# Patient Record
Sex: FEMALE | Race: AMERICAN INDIAN OR ALASKA NATIVE | Employment: OTHER | URBAN - METROPOLITAN AREA
[De-identification: names, ages, dates, MRNs, and addresses within clinical notes are randomized per-mention and may not be internally consistent; named-entity substitution may affect disease eponyms.]

---

## 2017-03-20 ENCOUNTER — HOSPITAL ENCOUNTER (EMERGENCY)
Age: 32
Discharge: HOME | End: 2017-03-20
Attending: EMERGENCY MEDICINE
Payer: MEDICARE

## 2017-03-20 VITALS
BODY MASS INDEX: 24.25 KG/M2 | HEART RATE: 87 BPM | TEMPERATURE: 97 F | OXYGEN SATURATION: 99 % | SYSTOLIC BLOOD PRESSURE: 102 MMHG | DIASTOLIC BLOOD PRESSURE: 69 MMHG | HEIGHT: 68 IN | RESPIRATION RATE: 16 BRPM | WEIGHT: 160 LBS

## 2017-03-20 DIAGNOSIS — Z34.90 INTRAUTERINE PREGNANCY: Primary | ICD-10-CM

## 2017-03-20 LAB
-: ABNORMAL
AMORPHOUS: ABNORMAL
AMPHETAMINE SCREEN URINE: NEGATIVE
BACTERIA: ABNORMAL
BARBITURATE SCREEN URINE: NEGATIVE
BENZODIAZEPINE SCREEN, URINE: NEGATIVE
BILIRUBIN URINE: ABNORMAL
BUPRENORPHINE URINE: ABNORMAL
CANNABINOID SCREEN URINE: NEGATIVE
CASTS UA: ABNORMAL /LPF (ref 0–8)
COCAINE METABOLITE, URINE: POSITIVE
COLOR: ABNORMAL
COMMENT UA: ABNORMAL
CRYSTALS, UA: ABNORMAL /HPF
DIRECT EXAM: NORMAL
EPITHELIAL CELLS UA: ABNORMAL /HPF (ref 0–5)
GLUCOSE URINE: NEGATIVE
HCG QUANTITATIVE: ABNORMAL IU/L
HCG(URINE) PREGNANCY TEST: POSITIVE
HIV AG/AB: NONREACTIVE
KETONES, URINE: ABNORMAL
LEUKOCYTE ESTERASE, URINE: NEGATIVE
Lab: NORMAL
MDMA URINE: ABNORMAL
METHADONE SCREEN, URINE: NEGATIVE
METHAMPHETAMINE, URINE: ABNORMAL
MUCUS: ABNORMAL
NITRITE, URINE: NEGATIVE
OPIATES, URINE: NEGATIVE
OTHER OBSERVATIONS UA: ABNORMAL
OXYCODONE SCREEN URINE: NEGATIVE
PH UA: 5 (ref 5–8)
PHENCYCLIDINE, URINE: NEGATIVE
PROPOXYPHENE, URINE: ABNORMAL
PROTEIN UA: NEGATIVE
RBC UA: ABNORMAL /HPF (ref 0–4)
RENAL EPITHELIAL, UA: ABNORMAL /HPF
SPECIFIC GRAVITY UA: 1.03 (ref 1–1.03)
SPECIMEN DESCRIPTION: NORMAL
STATUS: NORMAL
TEST INFORMATION: ABNORMAL
TRICHOMONAS: ABNORMAL
TRICYCLIC ANTIDEPRESSANTS, UR: ABNORMAL
TURBIDITY: ABNORMAL
URINE HGB: ABNORMAL
UROBILINOGEN, URINE: NORMAL
WBC UA: ABNORMAL /HPF (ref 0–5)
YEAST: ABNORMAL

## 2017-03-20 PROCEDURE — 96372 THER/PROPH/DIAG INJ SC/IM: CPT

## 2017-03-20 PROCEDURE — 84702 CHORIONIC GONADOTROPIN TEST: CPT

## 2017-03-20 PROCEDURE — 6360000002 HC RX W HCPCS: Performed by: EMERGENCY MEDICINE

## 2017-03-20 PROCEDURE — 87086 URINE CULTURE/COLONY COUNT: CPT

## 2017-03-20 PROCEDURE — 87491 CHLMYD TRACH DNA AMP PROBE: CPT

## 2017-03-20 PROCEDURE — 87389 HIV-1 AG W/HIV-1&-2 AB AG IA: CPT

## 2017-03-20 PROCEDURE — 87660 TRICHOMONAS VAGIN DIR PROBE: CPT

## 2017-03-20 PROCEDURE — 87510 GARDNER VAG DNA DIR PROBE: CPT

## 2017-03-20 PROCEDURE — 6370000000 HC RX 637 (ALT 250 FOR IP): Performed by: EMERGENCY MEDICINE

## 2017-03-20 PROCEDURE — 81001 URINALYSIS AUTO W/SCOPE: CPT

## 2017-03-20 PROCEDURE — 87480 CANDIDA DNA DIR PROBE: CPT

## 2017-03-20 PROCEDURE — 80307 DRUG TEST PRSMV CHEM ANLYZR: CPT

## 2017-03-20 PROCEDURE — 99284 EMERGENCY DEPT VISIT MOD MDM: CPT

## 2017-03-20 PROCEDURE — 84703 CHORIONIC GONADOTROPIN ASSAY: CPT

## 2017-03-20 PROCEDURE — 87591 N.GONORRHOEAE DNA AMP PROB: CPT

## 2017-03-20 PROCEDURE — 86403 PARTICLE AGGLUT ANTBDY SCRN: CPT

## 2017-03-20 RX ORDER — AZITHROMYCIN 250 MG/1
1000 TABLET, FILM COATED ORAL ONCE
Status: COMPLETED | OUTPATIENT
Start: 2017-03-20 | End: 2017-03-20

## 2017-03-20 RX ORDER — CEFTRIAXONE SODIUM 250 MG/1
250 INJECTION, POWDER, FOR SOLUTION INTRAMUSCULAR; INTRAVENOUS ONCE
Status: COMPLETED | OUTPATIENT
Start: 2017-03-20 | End: 2017-03-20

## 2017-03-20 RX ADMIN — CEFTRIAXONE SODIUM 250 MG: 250 INJECTION, POWDER, FOR SOLUTION INTRAMUSCULAR; INTRAVENOUS at 12:49

## 2017-03-20 RX ADMIN — AZITHROMYCIN 1000 MG: 250 TABLET, FILM COATED ORAL at 12:49

## 2017-03-20 ASSESSMENT — PAIN DESCRIPTION - LOCATION: LOCATION: ABDOMEN

## 2017-03-20 ASSESSMENT — ENCOUNTER SYMPTOMS
VOMITING: 0
ABDOMINAL PAIN: 1
COUGH: 0
SHORTNESS OF BREATH: 0
NAUSEA: 0
DIARRHEA: 0
COLOR CHANGE: 0

## 2017-03-20 ASSESSMENT — PAIN DESCRIPTION - ONSET: ONSET: SUDDEN

## 2017-03-20 ASSESSMENT — PAIN DESCRIPTION - PAIN TYPE: TYPE: ACUTE PAIN

## 2017-03-20 ASSESSMENT — PAIN DESCRIPTION - PROGRESSION: CLINICAL_PROGRESSION: NOT CHANGED

## 2017-03-20 ASSESSMENT — PAIN DESCRIPTION - ORIENTATION: ORIENTATION: LOWER

## 2017-03-20 ASSESSMENT — PAIN DESCRIPTION - FREQUENCY: FREQUENCY: CONTINUOUS

## 2017-03-20 ASSESSMENT — PAIN SCALES - GENERAL: PAINLEVEL_OUTOF10: 10

## 2017-03-20 ASSESSMENT — PAIN DESCRIPTION - DESCRIPTORS: DESCRIPTORS: CONSTANT;SHARP

## 2017-03-21 LAB
C TRACH DNA GENITAL QL NAA+PROBE: NEGATIVE
CULTURE: ABNORMAL
CULTURE: ABNORMAL
Lab: ABNORMAL
N. GONORRHOEAE DNA: NEGATIVE
SPECIMEN DESCRIPTION: ABNORMAL
STATUS: ABNORMAL

## 2018-08-18 ENCOUNTER — TELEPHONE (OUTPATIENT)
Dept: OTHER | Age: 33
End: 2018-08-18

## 2019-09-06 ENCOUNTER — OFFICE VISIT (OUTPATIENT)
Dept: INTERNAL MEDICINE CLINIC | Age: 34
End: 2019-09-06
Payer: MEDICARE

## 2019-09-06 VITALS
BODY MASS INDEX: 28.8 KG/M2 | DIASTOLIC BLOOD PRESSURE: 80 MMHG | OXYGEN SATURATION: 99 % | WEIGHT: 201.2 LBS | SYSTOLIC BLOOD PRESSURE: 100 MMHG | HEIGHT: 70 IN | HEART RATE: 89 BPM

## 2019-09-06 DIAGNOSIS — F17.200 CURRENT SMOKER: Chronic | ICD-10-CM

## 2019-09-06 DIAGNOSIS — Z83.3 FAMILY HISTORY OF DIABETES MELLITUS: ICD-10-CM

## 2019-09-06 DIAGNOSIS — F20.9 SCHIZOPHRENIA, UNSPECIFIED TYPE (HCC): Chronic | ICD-10-CM

## 2019-09-06 DIAGNOSIS — R56.9 CONVULSIONS, UNSPECIFIED CONVULSION TYPE (HCC): ICD-10-CM

## 2019-09-06 DIAGNOSIS — Z76.89 ENCOUNTER TO ESTABLISH CARE WITH NEW DOCTOR: Primary | ICD-10-CM

## 2019-09-06 DIAGNOSIS — G89.4 CHRONIC PAIN SYNDROME: ICD-10-CM

## 2019-09-06 DIAGNOSIS — F14.11 COCAINE ABUSE IN REMISSION (HCC): ICD-10-CM

## 2019-09-06 DIAGNOSIS — F12.90 MARIJUANA USE: ICD-10-CM

## 2019-09-06 DIAGNOSIS — Z98.51 HISTORY OF BILATERAL TUBAL LIGATION: ICD-10-CM

## 2019-09-06 DIAGNOSIS — M48.061 SPINAL STENOSIS OF LUMBAR REGION, UNSPECIFIED WHETHER NEUROGENIC CLAUDICATION PRESENT: Chronic | ICD-10-CM

## 2019-09-06 DIAGNOSIS — R53.83 FATIGUE, UNSPECIFIED TYPE: ICD-10-CM

## 2019-09-06 PROBLEM — F14.10 COCAINE ABUSE (HCC): Status: ACTIVE | Noted: 2019-09-06

## 2019-09-06 PROBLEM — F14.10 COCAINE ABUSE (HCC): Status: RESOLVED | Noted: 2019-09-06 | Resolved: 2019-09-06

## 2019-09-06 PROCEDURE — 4004F PT TOBACCO SCREEN RCVD TLK: CPT | Performed by: FAMILY MEDICINE

## 2019-09-06 PROCEDURE — G8419 CALC BMI OUT NRM PARAM NOF/U: HCPCS | Performed by: FAMILY MEDICINE

## 2019-09-06 PROCEDURE — G8427 DOCREV CUR MEDS BY ELIG CLIN: HCPCS | Performed by: FAMILY MEDICINE

## 2019-09-06 PROCEDURE — 99204 OFFICE O/P NEW MOD 45 MIN: CPT | Performed by: FAMILY MEDICINE

## 2019-09-06 RX ORDER — ALBUTEROL SULFATE 90 UG/1
2 AEROSOL, METERED RESPIRATORY (INHALATION) 4 TIMES DAILY PRN
Qty: 3 INHALER | Refills: 1 | Status: ON HOLD | OUTPATIENT
Start: 2019-09-06 | End: 2021-12-28 | Stop reason: ALTCHOICE

## 2019-09-06 RX ORDER — BUDESONIDE AND FORMOTEROL FUMARATE DIHYDRATE 80; 4.5 UG/1; UG/1
2 AEROSOL RESPIRATORY (INHALATION) 2 TIMES DAILY
Qty: 1 INHALER | Refills: 0 | Status: ON HOLD | OUTPATIENT
Start: 2019-09-06 | End: 2021-12-28 | Stop reason: ALTCHOICE

## 2019-09-06 ASSESSMENT — PATIENT HEALTH QUESTIONNAIRE - PHQ9
2. FEELING DOWN, DEPRESSED OR HOPELESS: 0
SUM OF ALL RESPONSES TO PHQ QUESTIONS 1-9: 0
1. LITTLE INTEREST OR PLEASURE IN DOING THINGS: 0
SUM OF ALL RESPONSES TO PHQ QUESTIONS 1-9: 0
SUM OF ALL RESPONSES TO PHQ9 QUESTIONS 1 & 2: 0

## 2019-09-06 NOTE — PROGRESS NOTES
her Pap and breast exam with her GYN  Further recommendations to follow labs  Observe and call for any concern  This note is created with a voice recognition program and while intend to generate a document that accurately reflects the content of the visit, no guarantee can be provided that every mistake has been identified and corrected by editing.           Stephanie Fernandez MD

## 2019-09-07 ASSESSMENT — ENCOUNTER SYMPTOMS
ALLERGIC/IMMUNOLOGIC NEGATIVE: 1
EYES NEGATIVE: 1
GASTROINTESTINAL NEGATIVE: 1
RESPIRATORY NEGATIVE: 1

## 2019-09-09 ENCOUNTER — TELEPHONE (OUTPATIENT)
Dept: INTERNAL MEDICINE CLINIC | Age: 34
End: 2019-09-09

## 2019-09-09 DIAGNOSIS — M48.00 SPINAL STENOSIS, UNSPECIFIED SPINAL REGION: Primary | Chronic | ICD-10-CM

## 2019-10-02 ENCOUNTER — HOSPITAL ENCOUNTER (EMERGENCY)
Age: 34
Discharge: HOME | End: 2019-10-02
Attending: EMERGENCY MEDICINE
Payer: MEDICARE

## 2019-10-02 VITALS
OXYGEN SATURATION: 100 % | HEIGHT: 66 IN | HEART RATE: 74 BPM | DIASTOLIC BLOOD PRESSURE: 86 MMHG | TEMPERATURE: 98.2 F | RESPIRATION RATE: 16 BRPM | WEIGHT: 201 LBS | BODY MASS INDEX: 32.3 KG/M2 | SYSTOLIC BLOOD PRESSURE: 118 MMHG

## 2019-10-02 RX ORDER — TRAZODONE HYDROCHLORIDE 150 MG/1
150 TABLET ORAL 2 TIMES DAILY PRN
Status: ON HOLD | COMMUNITY
End: 2021-12-28 | Stop reason: ALTCHOICE

## 2019-10-02 RX ORDER — OXCARBAZEPINE 150 MG/1
150 TABLET, FILM COATED ORAL 2 TIMES DAILY
Status: ON HOLD | COMMUNITY
End: 2021-12-28

## 2019-10-02 RX ORDER — HYDROXYZINE PAMOATE 50 MG/1
CAPSULE ORAL 3 TIMES DAILY
Status: ON HOLD | COMMUNITY
End: 2021-12-29 | Stop reason: HOSPADM

## 2019-10-02 ASSESSMENT — PAIN SCALES - GENERAL: PAINLEVEL_OUTOF10: 7

## 2020-06-24 ENCOUNTER — TELEPHONE (OUTPATIENT)
Dept: FAMILY MEDICINE CLINIC | Age: 35
End: 2020-06-24

## 2021-08-11 ENCOUNTER — SOCIAL WORK (OUTPATIENT)
Dept: PSYCHIATRY | Age: 36
End: 2021-08-11
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
## 2021-09-04 ENCOUNTER — HOSPITAL ENCOUNTER (EMERGENCY)
Age: 36
Discharge: HOME | End: 2021-09-04
Attending: EMERGENCY MEDICINE
Payer: MEDICARE

## 2021-09-04 ENCOUNTER — APPOINTMENT (OUTPATIENT)
Dept: GENERAL RADIOLOGY | Age: 36
End: 2021-09-04
Payer: MEDICARE

## 2021-09-04 VITALS
WEIGHT: 180 LBS | HEIGHT: 66 IN | RESPIRATION RATE: 16 BRPM | SYSTOLIC BLOOD PRESSURE: 116 MMHG | HEART RATE: 80 BPM | DIASTOLIC BLOOD PRESSURE: 75 MMHG | BODY MASS INDEX: 28.93 KG/M2 | TEMPERATURE: 97.3 F | OXYGEN SATURATION: 99 %

## 2021-09-04 DIAGNOSIS — S93.491A SPRAIN OF ANTERIOR TALOFIBULAR LIGAMENT OF RIGHT ANKLE, INITIAL ENCOUNTER: Primary | ICD-10-CM

## 2021-09-04 PROCEDURE — 6370000000 HC RX 637 (ALT 250 FOR IP): Performed by: EMERGENCY MEDICINE

## 2021-09-04 PROCEDURE — 73610 X-RAY EXAM OF ANKLE: CPT

## 2021-09-04 PROCEDURE — 99284 EMERGENCY DEPT VISIT MOD MDM: CPT

## 2021-09-04 RX ORDER — IBUPROFEN 800 MG/1
800 TABLET ORAL ONCE
Status: COMPLETED | OUTPATIENT
Start: 2021-09-04 | End: 2021-09-04

## 2021-09-04 RX ORDER — ACETAMINOPHEN 500 MG
1000 TABLET ORAL EVERY 8 HOURS PRN
Qty: 21 TABLET | Refills: 0 | Status: SHIPPED | OUTPATIENT
Start: 2021-09-04 | End: 2021-12-13

## 2021-09-04 RX ORDER — NAPROXEN 500 MG/1
500 TABLET ORAL 2 TIMES DAILY PRN
Qty: 14 TABLET | Refills: 0 | Status: SHIPPED | OUTPATIENT
Start: 2021-09-04 | End: 2021-11-15

## 2021-09-04 RX ADMIN — IBUPROFEN 800 MG: 800 TABLET, FILM COATED ORAL at 18:43

## 2021-09-04 ASSESSMENT — PAIN SCALES - GENERAL
PAINLEVEL_OUTOF10: 10

## 2021-09-04 ASSESSMENT — ENCOUNTER SYMPTOMS: COLOR CHANGE: 0
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
## 2021-11-15 ENCOUNTER — APPOINTMENT (OUTPATIENT)
Dept: CT IMAGING | Age: 36
End: 2021-11-15
Payer: MEDICARE

## 2021-11-15 ENCOUNTER — HOSPITAL ENCOUNTER (EMERGENCY)
Age: 36
Discharge: HOME | End: 2021-11-15
Attending: EMERGENCY MEDICINE
Payer: MEDICARE

## 2021-11-15 ENCOUNTER — APPOINTMENT (OUTPATIENT)
Dept: GENERAL RADIOLOGY | Age: 36
End: 2021-11-15
Payer: MEDICARE

## 2021-11-15 ENCOUNTER — HOSPITAL ENCOUNTER (OUTPATIENT)
Age: 36
Discharge: HOME | End: 2021-11-17
Payer: MEDICARE

## 2021-11-15 VITALS
RESPIRATION RATE: 20 BRPM | SYSTOLIC BLOOD PRESSURE: 130 MMHG | DIASTOLIC BLOOD PRESSURE: 89 MMHG | OXYGEN SATURATION: 98 % | TEMPERATURE: 98.2 F | HEART RATE: 93 BPM
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
## 2021-11-15 DIAGNOSIS — T14.8XXA CONTUSION OF SOFT TISSUE: Primary | ICD-10-CM

## 2021-11-15 LAB
ABSOLUTE EOS #: 0.41 K/UL (ref 0–0.44)
ABSOLUTE IMMATURE GRANULOCYTE: 0.04 K/UL (ref 0–0.3)
ABSOLUTE LYMPH #: 2.66 K/UL (ref 1.1–3.7)
ABSOLUTE MONO #: 0.87 K/UL (ref 0.1–1.2)
ANION GAP SERPL CALCULATED.3IONS-SCNC: 11 MMOL/L (ref 9–17)
BASOPHILS # BLD: 1 % (ref 0–2)
BASOPHILS ABSOLUTE: 0.08 K/UL (ref 0–0.2)
BUN BLDV-MCNC: 8 MG/DL (ref 6–20)
BUN/CREAT BLD: NORMAL (ref 9–20)
CALCIUM SERPL-MCNC: 8.9 MG/DL (ref 8.6–10.4)
CHLORIDE BLD-SCNC: 107 MMOL/L (ref 98–107)
CO2: 21 MMOL/L (ref 20–31)
CREAT SERPL-MCNC: 0.78 MG/DL (ref 0.5–0.9)
DIFFERENTIAL TYPE: ABNORMAL
EOSINOPHILS RELATIVE PERCENT: 5 % (ref 1–4)
GFR AFRICAN AMERICAN: >60 ML/MIN
GFR NON-AFRICAN AMERICAN: >60 ML/MIN
GFR SERPL CREATININE-BSD FRML MDRD: NORMAL ML/MIN/{1.73_M2}
GFR SERPL CREATININE-BSD FRML MDRD: NORMAL ML/MIN/{1.73_M2}
GLUCOSE BLD-MCNC: 91 MG/DL (ref 70–99)
HCG QUANTITATIVE: <1 IU/L
HCT VFR BLD CALC: 41.6 % (ref 36.3–47.1)
HEMOGLOBIN: 13.8 G/DL (ref 11.9–15.1)
IMMATURE GRANULOCYTES: 1 %
LYMPHOCYTES # BLD: 32 % (ref 24–43)
MCH RBC QN AUTO: 30.5 PG (ref 25.2–33.5)
MCHC RBC AUTO-ENTMCNC: 33.2 G/DL (ref 28.4–34.8)
MCV RBC AUTO: 92 FL (ref 82.6–102.9)
MONOCYTES # BLD: 10 % (ref 3–12)
NRBC AUTOMATED: 0 PER 100 WBC
PDW BLD-RTO: 13.2 % (ref 11.8–14.4)
PLATELET # BLD: 351 K/UL (ref 138–453)
PLATELET ESTIMATE: ABNORMAL
PMV BLD AUTO: 9.2 FL (ref 8.1–13.5)
POTASSIUM SERPL-SCNC: 3.9 MMOL/L (ref 3.7–5.3)
RBC # BLD: 4.52 M/UL (ref 3.95–5.11)
RBC # BLD: ABNORMAL 10*6/UL
SEG NEUTROPHILS: 51 % (ref 36–65)
SEGMENTED NEUTROPHILS ABSOLUTE COUNT: 4.3 K/UL (ref 1.5–8.1)
SODIUM BLD-SCNC: 139 MMOL/L (ref 135–144)
WBC # BLD: 8.4 K/UL (ref 3.5–11.3)
WBC # BLD: ABNORMAL 10*3/UL

## 2021-11-15 PROCEDURE — 3209999900 CT LUMBAR SPINE TRAUMA RECONSTRUCTION

## 2021-11-15 PROCEDURE — 93005 ELECTROCARDIOGRAM TRACING: CPT | Performed by: STUDENT IN AN ORGANIZED HEALTH CARE EDUCATION/TRAINING PROGRAM

## 2021-11-15 PROCEDURE — 6370000000 HC RX 637 (ALT 250 FOR IP): Performed by: STUDENT IN AN ORGANIZED HEALTH CARE EDUCATION/TRAINING PROGRAM

## 2021-11-15 PROCEDURE — 84702 CHORIONIC GONADOTROPIN TEST: CPT

## 2021-11-15 PROCEDURE — 96375 TX/PRO/DX INJ NEW DRUG ADDON: CPT

## 2021-11-15 PROCEDURE — 6360000002 HC RX W HCPCS: Performed by: STUDENT IN AN ORGANIZED HEALTH CARE EDUCATION/TRAINING PROGRAM

## 2021-11-15 PROCEDURE — 6360000002 HC RX W HCPCS: Performed by: EMERGENCY MEDICINE

## 2021-11-15 PROCEDURE — 6360000004 HC RX CONTRAST MEDICATION: Performed by: STUDENT IN AN ORGANIZED HEALTH CARE EDUCATION/TRAINING PROGRAM

## 2021-11-15 PROCEDURE — 70450 CT HEAD/BRAIN W/O DYE: CPT

## 2021-11-15 PROCEDURE — 72125 CT NECK SPINE W/O DYE: CPT

## 2021-11-15 PROCEDURE — 85025 COMPLETE CBC W/AUTO DIFF WBC: CPT

## 2021-11-15 PROCEDURE — 73590 X-RAY EXAM OF LOWER LEG: CPT

## 2021-11-15 PROCEDURE — 99283 EMERGENCY DEPT VISIT LOW MDM: CPT

## 2021-11-15 PROCEDURE — 96374 THER/PROPH/DIAG INJ IV PUSH: CPT

## 2021-11-15 PROCEDURE — 71260 CT THORAX DX C+: CPT

## 2021-11-15 PROCEDURE — 70486 CT MAXILLOFACIAL W/O DYE: CPT

## 2021-11-15 PROCEDURE — 70498 CT ANGIOGRAPHY NECK: CPT

## 2021-11-15 PROCEDURE — 3209999900 CT THORACIC SPINE TRAUMA RECONSTRUCTION

## 2021-11-15 PROCEDURE — 80048 BASIC METABOLIC PNL TOTAL CA: CPT

## 2021-11-15 PROCEDURE — 73130 X-RAY EXAM OF HAND: CPT

## 2021-11-15 RX ORDER — ACETAMINOPHEN 500 MG
1000 TABLET ORAL ONCE
Status: COMPLETED | OUTPATIENT
Start: 2021-11-15 | End: 2021-11-15

## 2021-11-15 RX ORDER — LIDOCAINE 4 G/G
1 PATCH TOPICAL DAILY
Qty: 7 PATCH | Refills: 0 | Status: SHIPPED | OUTPATIENT
Start: 2021-11-15 | End: 2021-11-15 | Stop reason: SDUPTHER

## 2021-11-15 RX ORDER — METOCLOPRAMIDE HYDROCHLORIDE 5 MG/ML
10 INJECTION INTRAMUSCULAR; INTRAVENOUS ONCE
Status: COMPLETED | OUTPATIENT
Start: 2021-11-15 | End: 2021-11-15

## 2021-11-15 RX ORDER — LIDOCAINE 4 G/G
1 PATCH TOPICAL DAILY
Qty: 7 PATCH | Refills: 0 | Status: SHIPPED | OUTPATIENT
Start: 2021-11-15 | End: 2021-11-22

## 2021-11-15 RX ORDER — METHOCARBAMOL 750 MG/1
750 TABLET, FILM COATED ORAL 4 TIMES DAILY
Qty: 20 TABLET | Refills: 0 | Status: SHIPPED | OUTPATIENT
Start: 2021-11-15 | End: 2021-11-15 | Stop reason: SDUPTHER

## 2021-11-15 RX ORDER — METOCLOPRAMIDE HYDROCHLORIDE 5 MG/ML
10 INJECTION INTRAMUSCULAR; INTRAVENOUS ONCE
Status: DISCONTINUED | OUTPATIENT
Start: 2021-11-15 | End: 2021-11-15

## 2021-11-15 RX ORDER — METHOCARBAMOL 750 MG/1
750 TABLET, FILM COATED ORAL 4 TIMES DAILY
Qty: 20 TABLET | Refills: 0 | Status: SHIPPED | OUTPATIENT
Start: 2021-11-15 | End: 2021-11-20

## 2021-11-15 RX ORDER — ACETAMINOPHEN 500 MG
500 TABLET ORAL 4 TIMES DAILY PRN
Qty: 28 TABLET | Refills: 0 | OUTPATIENT
Start: 2021-11-15 | End: 2021-12-13

## 2021-11-15 RX ORDER — ACETAMINOPHEN 500 MG
500 TABLET ORAL 4 TIMES DAILY PRN
Qty: 28 TABLET | Refills: 0 | Status: SHIPPED | OUTPATIENT
Start: 2021-11-15 | End: 2021-11-15 | Stop reason: SDUPTHER

## 2021-11-15 RX ORDER — IBUPROFEN 600 MG/1
600 TABLET ORAL 4 TIMES DAILY PRN
Qty: 28 TABLET | Refills: 0 | Status: SHIPPED | OUTPATIENT
Start: 2021-11-15 | End: 2021-11-15 | Stop reason: SDUPTHER

## 2021-11-15 RX ORDER — IBUPROFEN 600 MG/1
600 TABLET ORAL 4 TIMES DAILY PRN
Qty: 28 TABLET | Refills: 0 | OUTPATIENT
Start: 2021-11-15 | End: 2021-12-13

## 2021-11-15 RX ORDER — IBUPROFEN 800 MG/1
800 TABLET ORAL ONCE
Status: COMPLETED | OUTPATIENT
Start: 2021-11-15 | End: 2021-11-15

## 2021-11-15 RX ORDER — METHOCARBAMOL 500 MG/1
750 TABLET, FILM COATED ORAL ONCE
Status: COMPLETED | OUTPATIENT
Start: 2021-11-15 | End: 2021-11-15

## 2021-11-15 RX ORDER — MORPHINE SULFATE 4 MG/ML
4 INJECTION, SOLUTION INTRAMUSCULAR; INTRAVENOUS ONCE
Status: COMPLETED | OUTPATIENT
Start: 2021-11-15 | End: 2021-11-15

## 2021-11-15 RX ADMIN — IBUPROFEN 800 MG: 800 TABLET, FILM COATED ORAL at 17:22

## 2021-11-15 RX ADMIN — ACETAMINOPHEN 1000 MG: 500 TABLET ORAL at 16:23

## 2021-11-15 RX ADMIN — METOCLOPRAMIDE 10 MG: 5 INJECTION, SOLUTION INTRAMUSCULAR; INTRAVENOUS at 20:29

## 2021-11-15 RX ADMIN — METHOCARBAMOL 750 MG: 500 TABLET ORAL at 17:23

## 2021-11-15 RX ADMIN — MORPHINE SULFATE 4 MG: 4 INJECTION, SOLUTION INTRAMUSCULAR; INTRAVENOUS at 16:23

## 2021-11-15 RX ADMIN — IOPAMIDOL 150 ML: 755 INJECTION, SOLUTION INTRAVENOUS at 15:53

## 2021-11-15 ASSESSMENT — ENCOUNTER SYMPTOMS
NAUSEA: 0
VOMITING: 0
COUGH: 1
VOICE CHANGE: 1
EYE REDNESS: 0
SHORTNESS OF BREATH: 1
SORE THROAT: 1
ABDOMINAL PAIN: 0
BACK PAIN: 1

## 2021-11-15 ASSESSMENT — PAIN SCALES - GENERAL
PAINLEVEL_OUTOF10: 8
PAINLEVEL_OUTOF10: 9
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
## 2021-11-15 NOTE — ED PROVIDER NOTES
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 provider.           Joselo Harding MD  Emergency Medicine Resident    (Please note that portions of this note were completed with a voice recognition program.  Efforts were made to edit the dictations but occasionally words are mis-transcribed.)        Emani Raymond MD  Resident  11/15/21 3219       Joselo Harding MD  Resident  11/15/21 2449
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
## 2021-11-15 NOTE — ED NOTES
Bed: 41  Expected date:   Expected time:   Means of arrival:   Comments:  EVELIA Mcarthur RN  11/15/21 5847

## 2021-11-15 NOTE — Clinical Note
Gustav Sandifer was seen and treated in our emergency department on 11/15/2021. She may return to work on 11/17/2021. If you have any questions or concerns, please don't hesitate to call.       Harmeet Manjarrez MD

## 2021-11-16 ENCOUNTER — FOLLOWUP TELEPHONE ENCOUNTER (OUTPATIENT)
Dept: PSYCHIATRY | Age: 36
End: 2021-11-16

## 2021-11-16 LAB
EKG ATRIAL RATE: 79 BPM
EKG P AXIS: 52 DEGREES
EKG P-R INTERVAL: 152 MS
EKG Q-T INTERVAL: 396 MS
EKG QRS DURATION: 82 MS
EKG QTC CALCULATION (BAZETT): 454 MS
EKG R AXIS: 64 DEGREES
EKG T AXIS: 58 DEGREES
EKG VENTRICULAR RATE: 79 BPM

## 2021-11-16 NOTE — TELEPHONE ENCOUNTER
Writer followed up with pt post ER encounter. Referral for Critical access hospital services received from ER Social Work. Contacted pt's cousin phone per her request. Josselin Romano informed writer that she has not seen the pt since yesterday 11/15/21 morning. Writer provided cousin with [facility] contact information should pt contact her.  Contact number provided was [phone]

## 2021-11-16 NOTE — ED NOTES
Patient stated she will go to her cousin's house [facility], [address]. Patient stated it is safe. Patient stated she was able to talk with someone at [address] [place] [address] [address] and will contact them again tomorrow regarding further safe planning. Patient stated [facility] can contact her at cousin's phone number [phone]. [facility] set up [employer] to transport.       [facility]  11/15/21 8381

## 2021-11-16 NOTE — ED NOTES
Patient currently making phone calls to [facility] in [facility] and family members to establish safety plan. LSW provided information for [facility]. LSW spoke with Swapna Tavares earlier with [facility]-she stated if patient is interested in services to contact her.  Patient informed and agreeable to referral.     Levy Corcoran, SHELLIE  04/19/22 0027

## 2021-12-08 ENCOUNTER — TELEPHONE (OUTPATIENT)
Dept: PSYCHIATRY | Age: 36
End: 2021-12-08

## 2021-12-08 NOTE — TELEPHONE ENCOUNTER
This writer was contacted by Client by phone this date to request [facility] services. An intake appointment was scheduled with Jeff Sifuentes for 1:00 PM 12/09/2021 at the [facility] office. [employer] was scheduled for transportation.

## 2021-12-09 ENCOUNTER — HOSPITAL ENCOUNTER (OUTPATIENT)
Dept: PSYCHIATRY | Age: 36
Setting detail: THERAPIES SERIES
Discharge: HOME | End: 2021-12-09
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
## 2021-12-13 ENCOUNTER — HOSPITAL ENCOUNTER (EMERGENCY)
Age: 36
Discharge: HOME | End: 2021-12-13
Attending: EMERGENCY MEDICINE
Payer: MEDICARE

## 2021-12-13 VITALS
TEMPERATURE: 98.8 F | HEIGHT: 66 IN | RESPIRATION RATE: 16 BRPM | DIASTOLIC BLOOD PRESSURE: 87 MMHG | HEART RATE: 99 BPM | WEIGHT: 171 LBS | OXYGEN SATURATION: 95 % | SYSTOLIC BLOOD PRESSURE: 122 MMHG | BODY MASS INDEX: 27.48 KG/M2

## 2021-12-13 DIAGNOSIS — G56.03 BILATERAL CARPAL TUNNEL SYNDROME: ICD-10-CM

## 2021-12-13 DIAGNOSIS — M54.50 ACUTE EXACERBATION OF CHRONIC LOW BACK PAIN: Primary | ICD-10-CM

## 2021-12-13 DIAGNOSIS — G89.29 ACUTE EXACERBATION OF CHRONIC LOW BACK PAIN: Primary | ICD-10-CM

## 2021-12-13 PROCEDURE — 6360000002 HC RX W HCPCS: Performed by: EMERGENCY MEDICINE

## 2021-12-13 PROCEDURE — 96372 THER/PROPH/DIAG INJ SC/IM: CPT

## 2021-12-13 PROCEDURE — 99283 EMERGENCY DEPT VISIT LOW MDM: CPT

## 2021-12-13 PROCEDURE — 6370000000 HC RX 637 (ALT 250 FOR IP): Performed by: EMERGENCY MEDICINE

## 2021-12-13 RX ORDER — LIDOCAINE 50 MG/G
1 PATCH TOPICAL DAILY
Qty: 10 PATCH | Refills: 0 | Status: SHIPPED | OUTPATIENT
Start: 2021-12-13 | End: 2021-12-23

## 2021-12-13 RX ORDER — LIDOCAINE 4 G/G
2 PATCH TOPICAL ONCE
Status: DISCONTINUED | OUTPATIENT
Start: 2021-12-13 | End: 2021-12-13 | Stop reason: HOSPADM

## 2021-12-13 RX ORDER — METHOCARBAMOL 500 MG/1
500 TABLET, FILM COATED ORAL 4 TIMES DAILY PRN
Qty: 20 TABLET | Refills: 0 | Status: SHIPPED | OUTPATIENT
Start: 2021-12-13 | End: 2021-12-18

## 2021-12-13 RX ORDER — DEXAMETHASONE 4 MG/1
10 TABLET ORAL ONCE
Status: COMPLETED | OUTPATIENT
Start: 2021-12-13 | End: 2021-12-13

## 2021-12-13 RX ORDER — ORPHENADRINE CITRATE 30 MG/ML
60 INJECTION INTRAMUSCULAR; INTRAVENOUS ONCE
Status: COMPLETED | OUTPATIENT
Start: 2021-12-13 | End: 2021-12-13

## 2021-12-13 RX ORDER — HYDROCODONE BITARTRATE AND ACETAMINOPHEN 5; 325 MG/1; MG/1
1 TABLET ORAL ONCE
Status: COMPLETED | OUTPATIENT
Start: 2021-12-13 | End: 2021-12-13

## 2021-12-13 RX ORDER — HYDROCODONE BITARTRATE AND ACETAMINOPHEN 5; 325 MG/1; MG/1
1 TABLET ORAL EVERY 6 HOURS PRN
Qty: 12 TABLET | Refills: 0 | Status: SHIPPED | OUTPATIENT
Start: 2021-12-13 | End: 2021-12-16

## 2021-12-13 RX ORDER — NAPROXEN 500 MG/1
500 TABLET ORAL 2 TIMES DAILY PRN
Qty: 20 TABLET | Refills: 0 | Status: ON HOLD | OUTPATIENT
Start: 2021-12-13 | End: 2021-12-28 | Stop reason: ALTCHOICE

## 2021-12-13 RX ADMIN — HYDROCODONE BITARTRATE AND ACETAMINOPHEN 1 TABLET: 5; 325 TABLET ORAL at 13:42

## 2021-12-13 RX ADMIN — DEXAMETHASONE 10 MG: 4 TABLET ORAL at 13:43

## 2021-12-13 RX ADMIN — ORPHENADRINE CITRATE 60 MG: 30 INJECTION INTRAMUSCULAR; INTRAVENOUS at 13:42

## 2021-12-13 ASSESSMENT — ENCOUNTER SYMPTOMS
BACK PAIN: 1
ABDOMINAL PAIN: 0
COLOR CHANGE: 0

## 2021-12-13 ASSESSMENT — PAIN DESCRIPTION - FREQUENCY: FREQUENCY: CONTINUOUS

## 2021-12-13 ASSESSMENT — PAIN DESCRIPTION - DESCRIPTORS: DESCRIPTORS: CONSTANT

## 2021-12-13 ASSESSMENT — PAIN DESCRIPTION - LOCATION: LOCATION: BACK

## 2021-12-13 ASSESSMENT — PAIN DESCRIPTION - PAIN TYPE: TYPE: CHRONIC PAIN

## 2021-12-13 ASSESSMENT — PAIN DESCRIPTION - PROGRESSION: CLINICAL_PROGRESSION: GRADUALLY WORSENING

## 2021-12-13 ASSESSMENT — PAIN SCALES - GENERAL
PAINLEVEL_OUTOF10: 10
PAINLEVEL_OUTOF10: 10

## 2021-12-13 ASSESSMENT — PAIN DESCRIPTION - ORIENTATION: ORIENTATION: LOWER
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
## 2021-12-19 ENCOUNTER — HOSPITAL ENCOUNTER (EMERGENCY)
Age: 36
Discharge: HOME | End: 2021-12-20
Attending: EMERGENCY MEDICINE
Payer: MEDICARE

## 2021-12-19 VITALS
OXYGEN SATURATION: 90 % | SYSTOLIC BLOOD PRESSURE: 113 MMHG | RESPIRATION RATE: 19 BRPM | DIASTOLIC BLOOD PRESSURE: 72 MMHG | HEART RATE: 87 BPM | TEMPERATURE: 98.3 F

## 2021-12-19 DIAGNOSIS — F41.1 ANXIETY STATE: Primary | ICD-10-CM

## 2021-12-19 PROCEDURE — 6360000002 HC RX W HCPCS

## 2021-12-19 PROCEDURE — 96361 HYDRATE IV INFUSION ADD-ON: CPT

## 2021-12-19 PROCEDURE — 2580000003 HC RX 258: Performed by: STUDENT IN AN ORGANIZED HEALTH CARE EDUCATION/TRAINING PROGRAM

## 2021-12-19 PROCEDURE — 96375 TX/PRO/DX INJ NEW DRUG ADDON: CPT

## 2021-12-19 PROCEDURE — 96374 THER/PROPH/DIAG INJ IV PUSH: CPT

## 2021-12-19 PROCEDURE — 99285 EMERGENCY DEPT VISIT HI MDM: CPT

## 2021-12-19 PROCEDURE — 93005 ELECTROCARDIOGRAM TRACING: CPT | Performed by: STUDENT IN AN ORGANIZED HEALTH CARE EDUCATION/TRAINING PROGRAM

## 2021-12-19 PROCEDURE — 96376 TX/PRO/DX INJ SAME DRUG ADON: CPT

## 2021-12-19 RX ORDER — 0.9 % SODIUM CHLORIDE 0.9 %
1000 INTRAVENOUS SOLUTION INTRAVENOUS ONCE
Status: COMPLETED | OUTPATIENT
Start: 2021-12-19 | End: 2021-12-20

## 2021-12-19 RX ORDER — LORAZEPAM 2 MG/ML
INJECTION INTRAMUSCULAR
Status: COMPLETED
Start: 2021-12-19 | End: 2021-12-19

## 2021-12-19 RX ORDER — HALOPERIDOL 5 MG/ML
INJECTION INTRAMUSCULAR
Status: COMPLETED
Start: 2021-12-19 | End: 2021-12-19

## 2021-12-19 RX ORDER — LORAZEPAM 2 MG/ML
0.5 INJECTION INTRAMUSCULAR ONCE
Status: COMPLETED | OUTPATIENT
Start: 2021-12-19 | End: 2021-12-19

## 2021-12-19 RX ADMIN — LORAZEPAM 1 MG: 2 INJECTION INTRAMUSCULAR; INTRAVENOUS at 21:05

## 2021-12-19 RX ADMIN — HALOPERIDOL LACTATE 5 MG: 5 INJECTION, SOLUTION INTRAMUSCULAR at 21:03

## 2021-12-19 RX ADMIN — LORAZEPAM 0.5 MG: 2 INJECTION INTRAMUSCULAR at 20:31

## 2021-12-19 RX ADMIN — SODIUM CHLORIDE 1000 ML: 9 INJECTION, SOLUTION INTRAVENOUS at 20:33

## 2021-12-19 ASSESSMENT — ENCOUNTER SYMPTOMS
SHORTNESS OF BREATH: 0
COUGH: 0
BACK PAIN: 0
NAUSEA: 0
EYE PAIN: 0
DIARRHEA: 0
SORE THROAT: 0
VOMITING: 0
SINUS PAIN: 0
ABDOMINAL PAIN: 0

## 2021-12-20 LAB
EKG ATRIAL RATE: 93 BPM
EKG P AXIS: 79 DEGREES
EKG P-R INTERVAL: 152 MS
EKG Q-T INTERVAL: 372 MS
EKG QRS DURATION: 82 MS
EKG QTC CALCULATION (BAZETT): 462 MS
EKG R AXIS: 77 DEGREES
EKG T AXIS: 67 DEGREES
EKG VENTRICULAR RATE: 93 BPM

## 2021-12-20 PROCEDURE — 93010 ELECTROCARDIOGRAM REPORT: CPT | Performed by: INTERNAL MEDICINE

## 2021-12-20 PROCEDURE — 6370000000 HC RX 637 (ALT 250 FOR IP)

## 2021-12-20 RX ORDER — AMMONIA INHALANTS 0.04 G/.3ML
0.3 INHALANT RESPIRATORY (INHALATION) ONCE
Status: COMPLETED | OUTPATIENT
Start: 2021-12-20 | End: 2021-12-20

## 2021-12-20 RX ORDER — AMMONIA INHALANTS 0.04 G/.3ML
INHALANT RESPIRATORY (INHALATION)
Status: COMPLETED
Start: 2021-12-20 | End: 2021-12-20

## 2021-12-20 RX ADMIN — AMMONIA INHALANTS 0.3 ML: 0.04 INHALANT RESPIRATORY (INHALATION) at 04:18

## 2021-12-20 NOTE — ED NOTES
Bed: 01  Expected date:   Expected time:   Means of arrival:   Comments:  [address], RN  12/19/21 2004

## 2021-12-20 NOTE — ED PROVIDER NOTES
[facility] ED  Emergency Department Encounter  EmergencyMedicineResident     This patient was seen during the COVID-19 crisis. There were limited resources and those resources we did have had to be conserved for the sickest of patients. Pt Name: Fitz Goodrich  MRN: [id number]  [place] 1985  Date of evaluation: 12/19/21  PCP: No primary care provider on file. CHIEF COMPLAINT       Chief Complaint   Patient presents with    Anxiety       HISTORY OF PRESENT ILLNESS  (Location/Symptom, Timing/Onset, Context/Setting, Quality, Duration, Modifying Factors, Severity.)      Fitz Goodrich is a 29 y.o. female who presents relation of anxiety. Patient recently witnessed a friend overdose. EMS arrived to provide Narcan and revived her. She states that she is extremely anxious and agitated from the experience. She has a past medical history of PTSD, schizophrenia, bipolar disorder and insomnia. Patient states that she takes Seroquel at home and has not taken it yet tonight. PAST MEDICAL / SURGICAL /SOCIAL / FAMILY HISTORY      has a past medical history of ADHD, Allergy, Bipolar 1 disorder ([address] Bronchitis, chronic ([address] Cocaine abuse with intoxication with complication ([address] Osteoarthritis, Osteoarthritis, Rh negative state in antepartum period, Schizophrenia ([address] Seizures ([address] and Spinal stenosis. has a past surgical history that includes Cholecystectomy; hernia repair; Upper gastrointestinal endoscopy; Mandible fracture surgery; Dilation and curettage of uterus; hernia repair (2011); Dilation & curettage; Mandible surgery; hernia repair; and Dilation and curettage of uterus.       Social History     Socioeconomic History    Marital status: Single     Spouse name: Not on file    Number of children: Not on file    Years of education: Not on file    Highest education level: Not on file   Occupational History    Not on file   Tobacco Use    Smoking status: Current Every Day Smoker     Packs/day: 0.50     Years: 11.00     Pack years: 5.50     Types: Cigarettes     Start date: 2000    Smokeless tobacco: Never Used   Substance and Sexual Activity    Alcohol use: No     Comment: on occ    Drug use: No    Sexual activity: Yes     Partners: Male   Other Topics Concern    Not on file   Social History Narrative    ** Merged History Encounter **         ** Merged History Encounter **          Social Determinants of Health     Financial Resource Strain:     Difficulty of Paying Living Expenses: Not on file   Food Insecurity:     Worried About Running Out of Food in the Last Year: Not on file    Jony of Food in the Last Year: Not on file   Transportation Needs:     Lack of Transportation (Medical): Not on file    Lack of Transportation (Non-Medical):  Not on file   Physical Activity:     Days of Exercise per Week: Not on file    Minutes of Exercise per Session: Not on file   Stress:     Feeling of Stress : Not on file   Social Connections:     Frequency of Communication with Friends and Family: Not on file    Frequency of Social Gatherings with Friends and Family: Not on file    Attends Sabianism Services: Not on file    Active Member of [address] [place] [address] [employer] or Organizations: Not on file    Attends Club or Organization Meetings: Not on file    Marital Status: Not on file   Intimate Partner Violence:     Fear of Current or Ex-Partner: Not on file    Emotionally Abused: Not on file    Physically Abused: Not on file    Sexually Abused: Not on file   Housing Stability:     Unable to Pay for Housing in the Last Year: Not on file    Number of [place] in the Last Year: Not on file    Unstable Housing in the Last Year: Not on file       Family History   Problem Relation Age of Onset    Cancer Maternal Grandmother     Diabetes Maternal Grandmother     Diabetes Maternal Grandfather     Hypertension Mother     Breast Cancer Neg Hx     Colon Cancer Neg Hx     Eclampsia Neg Hx     Ovarian Cancer Neg Hx      Labor Neg Hx     Spont Abortions Neg Hx     Stroke Neg Hx        Allergies:  Dilaudid [hydromorphone hcl] and Dilaudid [hydromorphone hcl]    Home Medications:  Prior to Admission medications    Medication Sig Start Date End Date Taking?  Authorizing Provider   naproxen (NAPROSYN) 500 MG tablet Take 1 tablet by mouth 2 times daily as needed for Pain 21   Kisha Anderson Gamma, DO   lidocaine (LIDODERM) 5 % Place 1 patch onto the skin daily for 10 days 12 hours on, 12 hours off. 21  Kisha Jearld Gamma, DO   Elastic Bandages & Supports (WRIST SPLINT/COCK-UP/LEFT M) MISC 1 each by Does not apply route as needed (wrist and hand pain) 21   Kisha Jearld Gamma, DO   Elastic Bandages & Supports (WRIST SPLINT/COCK-UP/RIGHT M) MISC 1 each by Does not apply route as needed (wrist//hand pain) 21   Kisha Owens, DO   acetaminophen (TYLENOL) 500 MG tablet Take 1 tablet by mouth 4 times daily as needed for Pain 11/15/21 12/13/21  Nomi Mccauley MD   ibuprofen (ADVIL;MOTRIN) 600 MG tablet Take 1 tablet by mouth 4 times daily as needed for Pain 11/15/21 12/13/21  Nomi Mccauley MD   OXcarbazepine (TRILEPTAL) 150 MG tablet Take 150 mg by mouth 2 times daily    Historical Provider, MD   traZODone (DESYREL) 150 MG tablet Take 150 mg by mouth 2 times daily as needed for Sleep    Historical Provider, MD   Cariprazine HCl (VRAYLAR PO) Take by mouth    Historical Provider, MD   hydrOXYzine Pamoate (VISTARIL PO) Take by mouth 3 times daily    Historical Provider, MD   buPROPion HCl (WELLBUTRIN PO) Take by mouth    Historical Provider, MD   budesonide-formoterol (SYMBICORT) 80-4.5 MCG/ACT AERO Inhale 2 puffs into the lungs 2 times daily 19   Cristian Aguirre MD   albuterol sulfate  (90 Base) MCG/ACT inhaler Inhale 2 puffs into the lungs 4 times daily as needed for Wheezing 19   Cristian Aguirre MD   Prenatal Multivit-Min-Fe-FA (PRENATAL VITAMINS) 0.8 MG TABS Take 1 tablet by mouth daily  Patient not taking: Reported on 9/6/2019 3/20/17   Barber Kay MD   sertraline (ZOLOFT) 50 MG tablet Take 1 tablet by mouth daily  Patient not taking: Reported on 9/6/2019 7/6/16   Janelle Blue MD   nicotine (NICODERM CQ) 14 MG/24HR Place 1 patch onto the skin daily  Patient not taking: Reported on 9/6/2019 7/6/16   Janelle Blue MD       REVIEW OF SYSTEMS    (2-9 systems for level 4, 10 or more forlevel 5)      Review of Systems   Constitutional: Negative for activity change, chills and fever. HENT: Negative for congestion, sinus pain and sore throat. Eyes: Negative for pain and visual disturbance. Respiratory: Negative for cough and shortness of breath. Cardiovascular: Negative for chest pain. Gastrointestinal: Negative for abdominal pain, diarrhea, nausea and vomiting. Genitourinary: Negative for difficulty urinating, dysuria and hematuria. Musculoskeletal: Negative for back pain and myalgias. Skin: Negative for rash and wound. Neurological: Negative for dizziness, light-headedness and headaches. Psychiatric/Behavioral: Positive for agitation and sleep disturbance. Negative for behavioral problems, confusion, decreased concentration, dysphoric mood, hallucinations, self-injury and suicidal ideas. The patient is nervous/anxious and is hyperactive. PHYSICAL EXAM   (up to 7 for level 4, 8 or more forlevel 5)      ED TRIAGE VITALS BP: (!) 149/88, Temp: 98.3 °F (36.8 °C), Pulse: 112, Resp: 24, SpO2: 97 %    Vitals:    12/19/21 2018 12/19/21 2047 12/19/21 2132 12/19/21 2217   BP: (!) 115/97 84/63 120/78 113/72   Pulse: 105 102 94 87   Resp: 24 24 19    Temp:       TempSrc:       SpO2: 98% 100% 91% 90%         Physical Exam  Vitals and nursing note reviewed. Constitutional:       Appearance: Normal appearance. HENT:      Head: Normocephalic and atraumatic.       Nose: Nose normal.      Mouth/Throat:      Mouth: Mucous membranes are moist. Eyes:      Extraocular Movements: Extraocular movements intact. Pupils: Pupils are equal, round, and reactive to light. Cardiovascular:      Rate and Rhythm: Regular rhythm. Tachycardia present. Pulses: Normal pulses. Heart sounds: Normal heart sounds. Pulmonary:      Effort: Pulmonary effort is normal.      Breath sounds: Normal breath sounds. Abdominal:      General: Abdomen is flat. Palpations: Abdomen is soft. Musculoskeletal:         General: Normal range of motion. Cervical back: Normal range of motion. Skin:     General: Skin is warm and dry. Capillary Refill: Capillary refill takes less than 2 seconds. Neurological:      General: No focal deficit present. Mental Status: She is alert and oriented to person, place, and time. Psychiatric:      Comments: Anxious, agitated, AOx3, appropriate, not suicidal, not homicidal           DIFFERENTIAL  DIAGNOSIS     PLAN (LABS / IMAGING / EKG):  No orders of the defined types were placed in this encounter.       MEDICATIONS ORDERED:  ED Medication Orders (From admission, onward)    Start Ordered     Status Ordering Provider    12/20/21 0430 12/20/21 0417  ammonia inhaler 0.3 mL  ONCE         Last MAR action: Given - by Robert  on 12/20/21 at 0418 Babatunde WASSERMAN    12/19/21 2101 12/19/21 2101  LORazepam (ATIVAN) 2 MG/ML injection        Note to Pharmacy: Estrellita Recinos S: cabinet override    Last MAR action: Given - by Robert  on 12/19/21 at 2105     12/19/21 2101 12/19/21 2101  haloperidol lactate (HALDOL) 5 MG/ML injection        Note to Pharmacy: Evita Ohara: cabinet override    Last MAR action: Given - by Robert  on 12/19/21 at 2103     12/19/21 2030 12/19/21 2019  LORazepam (ATIVAN) injection 0.5 mg  ONCE         Last MAR action: Given - by Robert  on 12/19/21 at 2031 Debbi ARREGUIN    12/19/21 2030 12/19/21 2019  0.9 % sodium chloride bolus  ONCE         Last MAR action: Stopped - by Faridehdru Watson on 12/20/21 at 0245 [address]          DDX: Anxiety    DIAGNOSTIC RESULTS / [facility]     IMPRESSION & INITIAL PLAN:  This is a 36-year-old female presenting with evaluation of agitation and anxiety after a friend overdosed states pale with cyanotic lips. States that she was revived with Narcan. She has a history of PTSD and bipolar disorder. Plan EKG, Ativan and IV fluids. Patient began displaying increasing levels of anxiety pacing back to the ER, patient given an additional 1 mg of Ativan and 5 mg of Haldol. Patient's agitation was resolved patient resting comfortably in bed. Patient was watched for approximately 8 hours in the emergency department. She is resting comfortably, no longer anxious. She was aroused with pneumonia inhalant. Patient AOx3 arranging for taxi to provide her ride home. LABS:  No results found for this visit on 12/19/21. RADIOLOGY:  No orders to display       ECG:  Normal sinus rhythm ventricular rate 93, intervals are normal limits, no ST elevation or depression, no T wave abnormalities, good R wave progression    All EKG's are interpreted by the Emergency Department Physician who either signsor Co-signs this chart in the absence of a cardiologist.    CONSULTS:  None    CRITICAL CARE:  See attending physician note    FINAL IMPRESSION      1.  Anxiety state          DISPOSITION / PLAN     DISPOSITION    Discharge home    PATIENT REFERRED TO:  [address] [id number] [phone]  Schedule an appointment as soon as possible for a visit in 1 day      [facility] ED  [address] [place] [address]  [phone]    If symptoms worsen      DISCHARGE MEDICATIONS:  New Prescriptions    No medications on file     Modified Medications    No medications on file        Maria Alejandra Wei MD  Emergency Medicine Resident    (Please note that portions of this note were completed with a voice recognition program.  Efforts were made to edit the dictations but occasionally words are mis-transcribed.)       Baltazar Brown MD  Resident  12/20/21 3342

## 2021-12-20 NOTE — ED NOTES
Pt out of bed took off bp cuff, ecg monitor, and o2 sensor attempting to run out of door, increased anxiety, crying. Pt assisted back to bed with both siderails elevated.       Tami Simms RN  12/19/21 2128

## 2021-12-20 NOTE — ED PROVIDER NOTES
Faculty Sign-Out Attestation  Handoff taken on the following patient from prior Attending Physician: Susan Wolfe    I was available and discussed any additional care issues that arose and coordinated the management plans with the resident(s) caring for the patient during my duty period. Any areas of disagreement with residents documentation of care or procedures are noted on the chart. I was personally present for the key portions of any/all procedures during my duty period. I have documented in the chart those procedures where I was not present during the key portions. 72-year-old female presenting with anxiety attack after finding friend overdosed and requiring EMS and Narcan. Required Ativan and Haldol. Plan to discharge when awake and alert.     Pablo Rojas MD  Attending Physician       Pablo Rojas MD  12/20/21 4434

## 2021-12-20 NOTE — ED PROVIDER NOTES
[facility] ED     Emergency Department     Faculty Attestation    I performed a history and physical examination of the patient and discussed management with the resident. I reviewed the residents note and agree with the documented findings and plan of care. Any areas of disagreement are noted on the chart. I was personally present for the key portions of any procedures. I have documented in the chart those procedures where I was not present during the key portions. I have reviewed the emergency nurses triage note. I agree with the chief complaint, past medical history, past surgical history, allergies, medications, social and family history as documented unless otherwise noted below. For Physician Assistant/ Nurse Practitioner cases/documentation I have personally evaluated this patient and have completed at least one if not all key elements of the E/M (history, physical exam, and MDM). Additional findings are as noted. This patient was evaluated in the Emergency Department for symptoms described in the history of present illness. He/she was evaluated in the context of the global COVID-19 pandemic, which necessitated consideration that the patient might be at risk for infection with the SARS-CoV-2 virus that causes COVID-19. Institutional protocols and algorithms that pertain to the evaluation of patients at risk for COVID-19 are in a state of rapid change based on information released by regulatory bodies including the CDC and federal and state organizations. These policies and algorithms were followed during the patient's care in the ED. Patient arrives by EMS with suspected anxiety attack. Patient states she had been called by her best friend who had overdosed. Patient states she never seen anything like that before friend did require Narcan from EMS because she had stopped breathing. On arrival patient is anxious tremulous but awake alert and oriented.   Dilated pupils tachycardic but no diaphoresis. Patient denies any drug use herself.   Will medicate, EKG, reevaluate need for additional work-up    EKG interpretation: Sinus rhythm 93 normal intervals normal axis no acute ST or T changes normal EKG      Critical Care     none    Babita Jaquez MD, Mirlande Torres  Attending Emergency  Physician             Babita Jaquez MD  12/19/21 2022

## 2021-12-24 ENCOUNTER — APPOINTMENT (OUTPATIENT)
Dept: GENERAL RADIOLOGY | Age: 36
DRG: 917 | End: 2021-12-24
Payer: MEDICARE

## 2021-12-24 ENCOUNTER — APPOINTMENT (OUTPATIENT)
Dept: CT IMAGING | Age: 36
DRG: 917 | End: 2021-12-24
Payer: MEDICARE

## 2021-12-24 ENCOUNTER — HOSPITAL ENCOUNTER (INPATIENT)
Age: 36
LOS: 5 days | Discharge: PSYCHIATRIC OR SUBSTANCE USE FACILITY | DRG: 917 | End: 2021-12-29
Attending: EMERGENCY MEDICINE | Admitting: INTERNAL MEDICINE
Payer: MEDICARE

## 2021-12-24 DIAGNOSIS — T50.902A INTENTIONAL DRUG OVERDOSE, INITIAL ENCOUNTER (HCC): Primary | ICD-10-CM

## 2021-12-24 LAB
-: ABNORMAL
ABSOLUTE EOS #: 0.04 K/UL (ref 0–0.44)
ABSOLUTE IMMATURE GRANULOCYTE: 0.05 K/UL (ref 0–0.3)
ABSOLUTE LYMPH #: 0.87 K/UL (ref 1.1–3.7)
ABSOLUTE MONO #: 0.69 K/UL (ref 0.1–1.2)
ACETAMINOPHEN LEVEL: <5 UG/ML (ref 10–30)
ALBUMIN SERPL-MCNC: 4.3 G/DL (ref 3.5–5.2)
ALBUMIN/GLOBULIN RATIO: 1.8 (ref 1–2.5)
ALLEN TEST: ABNORMAL
ALLEN TEST: ABNORMAL
ALP BLD-CCNC: 68 U/L (ref 35–104)
ALT SERPL-CCNC: 11 U/L (ref 5–33)
AMORPHOUS: ABNORMAL
AMPHETAMINE SCREEN URINE: NEGATIVE
ANION GAP SERPL CALCULATED.3IONS-SCNC: 13 MMOL/L (ref 9–17)
AST SERPL-CCNC: 15 U/L
BACTERIA: ABNORMAL
BARBITURATE SCREEN URINE: NEGATIVE
BASOPHILS # BLD: 0 % (ref 0–2)
BASOPHILS ABSOLUTE: 0.03 K/UL (ref 0–0.2)
BENZODIAZEPINE SCREEN, URINE: NEGATIVE
BILIRUB SERPL-MCNC: 0.61 MG/DL (ref 0.3–1.2)
BILIRUBIN URINE: NEGATIVE
BUN BLDV-MCNC: 15 MG/DL (ref 6–20)
BUN/CREAT BLD: ABNORMAL (ref 9–20)
BUPRENORPHINE URINE: ABNORMAL
CALCIUM SERPL-MCNC: 10 MG/DL (ref 8.6–10.4)
CANNABINOID SCREEN URINE: NEGATIVE
CARBOXYHEMOGLOBIN: 0.6 % (ref 0–5)
CASTS UA: ABNORMAL /LPF (ref 0–8)
CHLORIDE BLD-SCNC: 107 MMOL/L (ref 98–107)
CO2: 24 MMOL/L (ref 20–31)
COCAINE METABOLITE, URINE: POSITIVE
COLOR: YELLOW
COMMENT UA: ABNORMAL
CREAT SERPL-MCNC: 1.02 MG/DL (ref 0.5–0.9)
CRYSTALS, UA: ABNORMAL /HPF
DIFFERENTIAL TYPE: ABNORMAL
EOSINOPHILS RELATIVE PERCENT: 0 % (ref 1–4)
EPITHELIAL CELLS UA: ABNORMAL /HPF (ref 0–5)
ETHANOL PERCENT: <0.01 %
ETHANOL: <10 MG/DL
FIO2: 100
FIO2: ABNORMAL
GFR AFRICAN AMERICAN: >60 ML/MIN
GFR NON-AFRICAN AMERICAN: >60 ML/MIN
GFR SERPL CREATININE-BSD FRML MDRD: ABNORMAL ML/MIN/{1.73_M2}
GFR SERPL CREATININE-BSD FRML MDRD: ABNORMAL ML/MIN/{1.73_M2}
GLUCOSE BLD-MCNC: 153 MG/DL (ref 70–99)
GLUCOSE URINE: NEGATIVE
HCG QUALITATIVE: NEGATIVE
HCO3 VENOUS: 25 MMOL/L (ref 24–30)
HCT VFR BLD CALC: 42.1 % (ref 36.3–47.1)
HEMOGLOBIN: 14.2 G/DL (ref 11.9–15.1)
IMMATURE GRANULOCYTES: 0 %
KETONES, URINE: NEGATIVE
LEUKOCYTE ESTERASE, URINE: NEGATIVE
LYMPHOCYTES # BLD: 6 % (ref 24–43)
MAGNESIUM: 1.9 MG/DL (ref 1.6–2.6)
MAGNESIUM: 2.1 MG/DL (ref 1.6–2.6)
MCH RBC QN AUTO: 30.4 PG (ref 25.2–33.5)
MCHC RBC AUTO-ENTMCNC: 33.7 G/DL (ref 28.4–34.8)
MCV RBC AUTO: 90.1 FL (ref 82.6–102.9)
MDMA URINE: ABNORMAL
METHADONE SCREEN, URINE: POSITIVE
METHAMPHETAMINE, URINE: ABNORMAL
METHEMOGLOBIN: ABNORMAL % (ref 0–1.5)
MODE: ABNORMAL
MODE: ABNORMAL
MONOCYTES # BLD: 4 % (ref 3–12)
MUCUS: ABNORMAL
NEGATIVE BASE EXCESS, ART: ABNORMAL (ref 0–2)
NEGATIVE BASE EXCESS, VEN: ABNORMAL MMOL/L (ref 0–2)
NITRITE, URINE: NEGATIVE
NOTIFICATION TIME: ABNORMAL
NOTIFICATION: ABNORMAL
NRBC AUTOMATED: 0 PER 100 WBC
O2 DEVICE/FLOW/%: ABNORMAL
O2 DEVICE/FLOW/%: ABNORMAL
O2 SAT, VEN: 95.6 % (ref 60–85)
OPIATES, URINE: NEGATIVE
OTHER OBSERVATIONS UA: ABNORMAL
OXYCODONE SCREEN URINE: NEGATIVE
OXYHEMOGLOBIN: ABNORMAL % (ref 95–98)
PATIENT TEMP: 37
PATIENT TEMP: ABNORMAL
PCO2, VEN, TEMP ADJ: ABNORMAL MMHG (ref 39–55)
PCO2, VEN: 41.3 (ref 39–55)
PDW BLD-RTO: 13.5 % (ref 11.8–14.4)
PEEP/CPAP: ABNORMAL
PH UA: 7 (ref 5–8)
PH VENOUS: 7.4 (ref 7.32–7.42)
PH, VEN, TEMP ADJ: ABNORMAL (ref 7.32–7.42)
PHENCYCLIDINE, URINE: NEGATIVE
PLATELET # BLD: 336 K/UL (ref 138–453)
PLATELET ESTIMATE: ABNORMAL
PMV BLD AUTO: 9.2 FL (ref 8.1–13.5)
PO2, VEN, TEMP ADJ: ABNORMAL MMHG (ref 30–50)
PO2, VEN: 87.4 (ref 30–50)
POC HCO3: 26.9 MMOL/L (ref 21–28)
POC LACTIC ACID: 0.77 MMOL/L (ref 0.56–1.39)
POC O2 SATURATION: 94 % (ref 94–98)
POC PCO2 TEMP: ABNORMAL MM HG
POC PCO2: 43.4 MM HG (ref 35–48)
POC PH TEMP: ABNORMAL
POC PH: 7.4 (ref 7.35–7.45)
POC PO2 TEMP: ABNORMAL MM HG
POC PO2: 69.1 MM HG (ref 83–108)
POSITIVE BASE EXCESS, ART: 2 (ref 0–3)
POSITIVE BASE EXCESS, VEN: 0.6 MMOL/L (ref 0–2)
POTASSIUM SERPL-SCNC: 3.1 MMOL/L (ref 3.7–5.3)
POTASSIUM SERPL-SCNC: 3.2 MMOL/L (ref 3.7–5.3)
PROPOXYPHENE, URINE: ABNORMAL
PROTEIN UA: ABNORMAL
PSV: ABNORMAL
PT. POSITION: ABNORMAL
RBC # BLD: 4.67 M/UL (ref 3.95–5.11)
RBC # BLD: ABNORMAL 10*6/UL
RBC UA: ABNORMAL /HPF (ref 0–4)
RENAL EPITHELIAL, UA: ABNORMAL /HPF
RESPIRATORY RATE: ABNORMAL
SALICYLATE LEVEL: <1 MG/DL (ref 3–10)
SAMPLE SITE: ABNORMAL
SAMPLE SITE: ABNORMAL
SARS-COV-2, RAPID: NOT DETECTED
SEG NEUTROPHILS: 90 % (ref 36–65)
SEGMENTED NEUTROPHILS ABSOLUTE COUNT: 14.2 K/UL (ref 1.5–8.1)
SET RATE: ABNORMAL
SODIUM BLD-SCNC: 144 MMOL/L (ref 135–144)
SPECIFIC GRAVITY UA: 1.02 (ref 1–1.03)
SPECIMEN DESCRIPTION: NORMAL
TCO2 (CALC), ART: ABNORMAL MMOL/L (ref 22–29)
TEST INFORMATION: ABNORMAL
TEXT FOR RESPIRATORY: ABNORMAL
TOTAL HB: ABNORMAL G/DL (ref 12–16)
TOTAL PROTEIN: 6.7 G/DL (ref 6.4–8.3)
TOTAL RATE: ABNORMAL
TOXIC TRICYCLIC SC,BLOOD: NEGATIVE
TRICHOMONAS: ABNORMAL
TRICYCLIC ANTIDEPRESSANTS, UR: ABNORMAL
TROPONIN INTERP: NORMAL
TROPONIN T: NORMAL NG/ML
TROPONIN, HIGH SENSITIVITY: <6 NG/L (ref 0–14)
TURBIDITY: ABNORMAL
URINE HGB: NEGATIVE
UROBILINOGEN, URINE: NORMAL
VT: ABNORMAL
WBC # BLD: 15.9 K/UL (ref 3.5–11.3)
WBC # BLD: ABNORMAL 10*3/UL
WBC UA: ABNORMAL /HPF (ref 0–5)
YEAST: ABNORMAL

## 2021-12-24 PROCEDURE — 83735 ASSAY OF MAGNESIUM: CPT

## 2021-12-24 PROCEDURE — 0BH18EZ INSERTION OF ENDOTRACHEAL AIRWAY INTO TRACHEA, VIA NATURAL OR ARTIFICIAL OPENING ENDOSCOPIC: ICD-10-PCS | Performed by: INTERNAL MEDICINE

## 2021-12-24 PROCEDURE — 84703 CHORIONIC GONADOTROPIN ASSAY: CPT

## 2021-12-24 PROCEDURE — 2700000000 HC OXYGEN THERAPY PER DAY

## 2021-12-24 PROCEDURE — 6360000002 HC RX W HCPCS: Performed by: STUDENT IN AN ORGANIZED HEALTH CARE EDUCATION/TRAINING PROGRAM

## 2021-12-24 PROCEDURE — 99285 EMERGENCY DEPT VISIT HI MDM: CPT

## 2021-12-24 PROCEDURE — 85025 COMPLETE CBC W/AUTO DIFF WBC: CPT

## 2021-12-24 PROCEDURE — 87635 SARS-COV-2 COVID-19 AMP PRB: CPT

## 2021-12-24 PROCEDURE — 80053 COMPREHEN METABOLIC PANEL: CPT

## 2021-12-24 PROCEDURE — 94002 VENT MGMT INPAT INIT DAY: CPT

## 2021-12-24 PROCEDURE — 96367 TX/PROPH/DG ADDL SEQ IV INF: CPT

## 2021-12-24 PROCEDURE — 36415 COLL VENOUS BLD VENIPUNCTURE: CPT

## 2021-12-24 PROCEDURE — 5A1945Z RESPIRATORY VENTILATION, 24-96 CONSECUTIVE HOURS: ICD-10-PCS | Performed by: INTERNAL MEDICINE

## 2021-12-24 PROCEDURE — 80179 DRUG ASSAY SALICYLATE: CPT

## 2021-12-24 PROCEDURE — 93005 ELECTROCARDIOGRAM TRACING: CPT | Performed by: STUDENT IN AN ORGANIZED HEALTH CARE EDUCATION/TRAINING PROGRAM

## 2021-12-24 PROCEDURE — 70450 CT HEAD/BRAIN W/O DYE: CPT

## 2021-12-24 PROCEDURE — 2000000000 HC ICU R&B

## 2021-12-24 PROCEDURE — 80143 DRUG ASSAY ACETAMINOPHEN: CPT

## 2021-12-24 PROCEDURE — 2580000003 HC RX 258: Performed by: STUDENT IN AN ORGANIZED HEALTH CARE EDUCATION/TRAINING PROGRAM

## 2021-12-24 PROCEDURE — 81001 URINALYSIS AUTO W/SCOPE: CPT

## 2021-12-24 PROCEDURE — 2500000003 HC RX 250 WO HCPCS

## 2021-12-24 PROCEDURE — 84484 ASSAY OF TROPONIN QUANT: CPT

## 2021-12-24 PROCEDURE — 96365 THER/PROPH/DIAG IV INF INIT: CPT

## 2021-12-24 PROCEDURE — 2500000003 HC RX 250 WO HCPCS: Performed by: STUDENT IN AN ORGANIZED HEALTH CARE EDUCATION/TRAINING PROGRAM

## 2021-12-24 PROCEDURE — 6360000002 HC RX W HCPCS

## 2021-12-24 PROCEDURE — 94761 N-INVAS EAR/PLS OXIMETRY MLT: CPT

## 2021-12-24 PROCEDURE — 82805 BLOOD GASES W/O2 SATURATION: CPT

## 2021-12-24 PROCEDURE — 96375 TX/PRO/DX INJ NEW DRUG ADDON: CPT

## 2021-12-24 PROCEDURE — G0480 DRUG TEST DEF 1-7 CLASSES: HCPCS

## 2021-12-24 PROCEDURE — 84132 ASSAY OF SERUM POTASSIUM: CPT

## 2021-12-24 PROCEDURE — 71045 X-RAY EXAM CHEST 1 VIEW: CPT

## 2021-12-24 PROCEDURE — 82803 BLOOD GASES ANY COMBINATION: CPT

## 2021-12-24 PROCEDURE — 80307 DRUG TEST PRSMV CHEM ANLYZR: CPT

## 2021-12-24 PROCEDURE — 83605 ASSAY OF LACTIC ACID: CPT

## 2021-12-24 RX ORDER — SODIUM CHLORIDE 9 MG/ML
INJECTION, SOLUTION INTRAVENOUS CONTINUOUS
Status: DISCONTINUED | OUTPATIENT
Start: 2021-12-24 | End: 2021-12-25

## 2021-12-24 RX ORDER — ONDANSETRON 2 MG/ML
4 INJECTION INTRAMUSCULAR; INTRAVENOUS EVERY 6 HOURS PRN
Status: DISCONTINUED | OUTPATIENT
Start: 2021-12-24 | End: 2021-12-29 | Stop reason: HOSPADM

## 2021-12-24 RX ORDER — KETAMINE HYDROCHLORIDE 10 MG/ML
INJECTION, SOLUTION INTRAMUSCULAR; INTRAVENOUS
Status: COMPLETED
Start: 2021-12-24 | End: 2021-12-24

## 2021-12-24 RX ORDER — SODIUM CHLORIDE 9 MG/ML
25 INJECTION, SOLUTION INTRAVENOUS PRN
Status: DISCONTINUED | OUTPATIENT
Start: 2021-12-24 | End: 2021-12-29 | Stop reason: HOSPADM

## 2021-12-24 RX ORDER — MORPHINE SULFATE 10 MG/ML
8 INJECTION, SOLUTION INTRAMUSCULAR; INTRAVENOUS ONCE
Status: COMPLETED | OUTPATIENT
Start: 2021-12-24 | End: 2021-12-24

## 2021-12-24 RX ORDER — ACETAMINOPHEN 650 MG/1
650 SUPPOSITORY RECTAL EVERY 6 HOURS PRN
Status: DISCONTINUED | OUTPATIENT
Start: 2021-12-24 | End: 2021-12-29 | Stop reason: HOSPADM

## 2021-12-24 RX ORDER — NALOXONE HYDROCHLORIDE 0.4 MG/ML
0.4 INJECTION, SOLUTION INTRAMUSCULAR; INTRAVENOUS; SUBCUTANEOUS ONCE
Status: COMPLETED | OUTPATIENT
Start: 2021-12-24 | End: 2021-12-24

## 2021-12-24 RX ORDER — POLYETHYLENE GLYCOL 3350 17 G/17G
17 POWDER, FOR SOLUTION ORAL DAILY PRN
Status: DISCONTINUED | OUTPATIENT
Start: 2021-12-24 | End: 2021-12-29 | Stop reason: HOSPADM

## 2021-12-24 RX ORDER — SODIUM CHLORIDE 0.9 % (FLUSH) 0.9 %
5-40 SYRINGE (ML) INJECTION EVERY 12 HOURS SCHEDULED
Status: DISCONTINUED | OUTPATIENT
Start: 2021-12-24 | End: 2021-12-29 | Stop reason: HOSPADM

## 2021-12-24 RX ORDER — POTASSIUM CHLORIDE 7.45 MG/ML
10 INJECTION INTRAVENOUS ONCE
Status: COMPLETED | OUTPATIENT
Start: 2021-12-24 | End: 2021-12-24

## 2021-12-24 RX ORDER — 0.9 % SODIUM CHLORIDE 0.9 %
1000 INTRAVENOUS SOLUTION INTRAVENOUS ONCE
Status: COMPLETED | OUTPATIENT
Start: 2021-12-24 | End: 2021-12-24

## 2021-12-24 RX ORDER — MAGNESIUM SULFATE IN WATER 40 MG/ML
2000 INJECTION, SOLUTION INTRAVENOUS ONCE
Status: COMPLETED | OUTPATIENT
Start: 2021-12-24 | End: 2021-12-24

## 2021-12-24 RX ORDER — PROPOFOL 10 MG/ML
5-50 INJECTION, EMULSION INTRAVENOUS
Status: DISCONTINUED | OUTPATIENT
Start: 2021-12-24 | End: 2021-12-26

## 2021-12-24 RX ORDER — SODIUM CHLORIDE 0.9 % (FLUSH) 0.9 %
5-40 SYRINGE (ML) INJECTION PRN
Status: DISCONTINUED | OUTPATIENT
Start: 2021-12-24 | End: 2021-12-29 | Stop reason: HOSPADM

## 2021-12-24 RX ORDER — FENTANYL CITRATE 50 UG/ML
50 INJECTION, SOLUTION INTRAMUSCULAR; INTRAVENOUS ONCE
Status: COMPLETED | OUTPATIENT
Start: 2021-12-24 | End: 2021-12-24

## 2021-12-24 RX ORDER — HEPARIN SODIUM 5000 [USP'U]/ML
5000 INJECTION, SOLUTION INTRAVENOUS; SUBCUTANEOUS EVERY 8 HOURS SCHEDULED
Status: DISCONTINUED | OUTPATIENT
Start: 2021-12-24 | End: 2021-12-29 | Stop reason: HOSPADM

## 2021-12-24 RX ORDER — PROPOFOL 10 MG/ML
INJECTION, EMULSION INTRAVENOUS
Status: COMPLETED
Start: 2021-12-24 | End: 2021-12-24

## 2021-12-24 RX ORDER — ONDANSETRON 4 MG/1
4 TABLET, ORALLY DISINTEGRATING ORAL EVERY 8 HOURS PRN
Status: DISCONTINUED | OUTPATIENT
Start: 2021-12-24 | End: 2021-12-29 | Stop reason: HOSPADM

## 2021-12-24 RX ORDER — ACETAMINOPHEN 325 MG/1
650 TABLET ORAL EVERY 6 HOURS PRN
Status: DISCONTINUED | OUTPATIENT
Start: 2021-12-24 | End: 2021-12-29 | Stop reason: HOSPADM

## 2021-12-24 RX ADMIN — POTASSIUM CHLORIDE 10 MEQ: 10 INJECTION, SOLUTION INTRAVENOUS at 12:00

## 2021-12-24 RX ADMIN — FAMOTIDINE 20 MG: 10 INJECTION INTRAVENOUS at 21:14

## 2021-12-24 RX ADMIN — SODIUM CHLORIDE: 9 INJECTION, SOLUTION INTRAVENOUS at 21:29

## 2021-12-24 RX ADMIN — Medication 1 MG/HR: at 16:24

## 2021-12-24 RX ADMIN — AMPICILLIN AND SULBACTAM 1500 MG: 1; .5 INJECTION, POWDER, FOR SOLUTION INTRAVENOUS at 19:04

## 2021-12-24 RX ADMIN — PROPOFOL 20 MCG/KG/MIN: 10 INJECTION, EMULSION INTRAVENOUS at 14:44

## 2021-12-24 RX ADMIN — FENTANYL CITRATE 50 MCG: 50 INJECTION INTRAMUSCULAR; INTRAVENOUS at 15:02

## 2021-12-24 RX ADMIN — MAGNESIUM SULFATE 2000 MG: 2 INJECTION INTRAVENOUS at 11:36

## 2021-12-24 RX ADMIN — SODIUM CHLORIDE: 9 INJECTION, SOLUTION INTRAVENOUS at 16:20

## 2021-12-24 RX ADMIN — PROPOFOL 70 MCG/KG/MIN: 10 INJECTION, EMULSION INTRAVENOUS at 21:05

## 2021-12-24 RX ADMIN — KETAMINE HYDROCHLORIDE: 10 INJECTION INTRAMUSCULAR; INTRAVENOUS at 14:30

## 2021-12-24 RX ADMIN — HEPARIN SODIUM 5000 UNITS: 5000 INJECTION INTRAVENOUS; SUBCUTANEOUS at 21:14

## 2021-12-24 RX ADMIN — SODIUM CHLORIDE 1000 ML: 9 INJECTION, SOLUTION INTRAVENOUS at 12:15

## 2021-12-24 RX ADMIN — NALOXONE HYDROCHLORIDE 0.4 MG: 0.4 INJECTION, SOLUTION INTRAMUSCULAR; INTRAVENOUS; SUBCUTANEOUS at 12:00

## 2021-12-24 RX ADMIN — MORPHINE SULFATE 8 MG: 10 INJECTION, SOLUTION INTRAMUSCULAR; INTRAVENOUS at 15:35

## 2021-12-24 ASSESSMENT — ENCOUNTER SYMPTOMS: TACHYPNEA: 1

## 2021-12-24 ASSESSMENT — PULMONARY FUNCTION TESTS
PIF_VALUE: 17
PIF_VALUE: 19
PIF_VALUE: 26

## 2021-12-24 ASSESSMENT — PAIN SCALES - GENERAL
PAINLEVEL_OUTOF10: 10
PAINLEVEL_OUTOF10: 7
PAINLEVEL_OUTOF10: 10
PAINLEVEL_OUTOF10: 9

## 2021-12-24 NOTE — PLAN OF CARE
Writer spoke with poison control and informed about patient medication. Poison control recommended daily EKG, keep magnesium >2  and potassium > 4. Poison control also mention and since patient is on Wellbutrin keep a close eye on seizure activities. If patient is experiencing seizure activities can call neurology.     Jak Dukes Sender  PGY-2  Critical Care Team    12/24/2021  6:03 PM

## 2021-12-24 NOTE — ED NOTES
[facility], RN  12/24/21 6670

## 2021-12-24 NOTE — ED NOTES
Patient brought into ED via EMS from home for evaluation s/p likely overdose. Patient with hx bipolar disorder and multiple suicide attempts in the past. First responders arrived to patient's house where she was somewhat repsonsive for them. Patient was answering some questions, opening her eyes spontaneously, breathing on her own. EMS placed IV, pupils were 2 and nonreactive so they gave the patient 2mg narcan with pupillary response, no change in mental status. Patient became less responsive en route. Patient arrives to ED nonverbal, not responding to commands or questions. Patient opening eyes only to voice. Patient did not tell EMS that she overdoses on any medications specifically. EMS state the patient had multiple mood stabilizing drugs at bedside including xanax, but none of the pill containers were empty so unsure what the patient took.      Cole Harris RN  12/24/21 0257

## 2021-12-24 NOTE — ED NOTES
Pt resting on stretcher. Respirations even and unlabored. Call light remains within reach. No needs at this time.        Christy Medeiros RN  12/24/21 4600

## 2021-12-24 NOTE — ED NOTES
SW consulted for possible suicide attempt via overdose. SW met with patient at bedside. Patient unable to have intelligent conversation at this time. SW to monitor for improvement of current condition and attempt to reassess. SW left message for IP SW for handoff.      SHELLIE Jc  12/24/21 [address] [facility] [place], [place]  12/24/21 3758

## 2021-12-24 NOTE — ED NOTES
Patient placed on BIPAP. Patient became combative. Patient reoriented and redirected.  POC for ketamine at this time prior to BIPAP for 5-10 minutes and intubation to follow     Vinh Gibbs RN  12/24/21 1771

## 2021-12-24 NOTE — ED PROVIDER NOTES
[facility] ED  Emergency Department Encounter  Emergency Medicine Resident     Pt Name: Frank Alba  MRN: [id number]  Aakashgfwhit 1985  Date of evaluation: 12/24/21  PCP:  No primary care provider on file. CHIEF COMPLAINT       Chief Complaint   Patient presents with    Drug Overdose       HISTORY OFPRESENT ILLNESS  (Location/Symptom, Timing/Onset, Context/Setting, Quality, Duration, Modifying Factors,Severity.)      Frank Alba is a 29 y.o. female with a history of bipolar disorder, cocaine abuse, schizophrenia with recent evaluation with concerns for anxiety secondary to overdose of a friend who presents with concerns for potential intentional drug overdose for suicide attempts. Patient brought in by EMS, uncertain who called EMS for patient. Patient altered, unable to go through review of systems. As per EMS multiple pill bottles were located next patient when patient was found all of them none emptied. Patient received Narcan in route without improvement of symptoms, patient is protecting airway upon initial evaluation, GCS of 9, saturating 92%. Patient able to be woken up with sternal rub, assisted in placing down on upon initial evaluation. However patient is unable to complete review of systems    PAST MEDICAL / SURGICAL / SOCIAL / FAMILY HISTORY      has a past medical history of ADHD, Allergy, Bipolar 1 disorder ([address] Bronchitis, chronic ([address] Cocaine abuse with intoxication with complication ([address] Osteoarthritis, Osteoarthritis, Rh negative state in antepartum period, Schizophrenia ([address] Seizures ([address] and Spinal stenosis. has a past surgical history that includes Cholecystectomy; hernia repair; Upper gastrointestinal endoscopy; Mandible fracture surgery; Dilation and curettage of uterus; hernia repair (2011); Dilation & curettage; Mandible surgery; hernia repair; and Dilation and curettage of uterus.      Social History     Socioeconomic History    Marital status: Single     Spouse name: Not on file    Number of children: Not on file    Years of education: Not on file    Highest education level: Not on file   Occupational History    Not on file   Tobacco Use    Smoking status: Current Every Day Smoker     Packs/day: 0.50     Years: 11.00     Pack years: 5.50     Types: Cigarettes     Start date: 2000    Smokeless tobacco: Never Used   Substance and Sexual Activity    Alcohol use: No     Comment: on occ    Drug use: No    Sexual activity: Yes     Partners: Male   Other Topics Concern    Not on file   Social History Narrative    ** Merged History Encounter **         ** Merged History Encounter **          Social Determinants of Health     Financial Resource Strain:     Difficulty of Paying Living Expenses: Not on file   Food Insecurity:     Worried About Running Out of Food in the Last Year: Not on file    Jony of Food in the Last Year: Not on file   Transportation Needs:     Lack of Transportation (Medical): Not on file    Lack of Transportation (Non-Medical):  Not on file   Physical Activity:     Days of Exercise per Week: Not on file    Minutes of Exercise per Session: Not on file   Stress:     Feeling of Stress : Not on file   Social Connections:     Frequency of Communication with Friends and Family: Not on file    Frequency of Social Gatherings with Friends and Family: Not on file    Attends Voodoo Services: Not on file    Active Member of [address] [place] [address] or Organizations: Not on file    Attends Club or Organization Meetings: Not on file    Marital Status: Not on file   Intimate Partner Violence:     Fear of Current or Ex-Partner: Not on file    Emotionally Abused: Not on file    Physically Abused: Not on file    Sexually Abused: Not on file   Housing Stability:     Unable to Pay for Housing in the Last Year: Not on file    Number of [place] in the Last Year: Not on file    Unstable Housing in the Last Year: Not on file Family History   Problem Relation Age of Onset    Cancer Maternal Grandmother     Diabetes Maternal Grandmother     Diabetes Maternal Grandfather     Hypertension Mother     Breast Cancer Neg Hx     Colon Cancer Neg Hx     Eclampsia Neg Hx     Ovarian Cancer Neg Hx      Labor Neg Hx     Spont Abortions Neg Hx     Stroke Neg Hx        Allergies:  Dilaudid [hydromorphone hcl] and Dilaudid [hydromorphone hcl]    Home Medications:  Prior to Admission medications    Medication Sig Start Date End Date Taking?  Authorizing Provider   naproxen (NAPROSYN) 500 MG tablet Take 1 tablet by mouth 2 times daily as needed for Pain 21   Kisha Mejía, DO   Elastic Bandages & Supports (WRIST SPLINT/COCK-UP/LEFT M) MISC 1 each by Does not apply route as needed (wrist and hand pain) 21   Kisha Mejía, DO   Elastic Bandages & Supports (WRIST SPLINT/COCK-UP/RIGHT M) MISC 1 each by Does not apply route as needed (wrist//hand pain) 21   Kisha Mejía DO   acetaminophen (TYLENOL) 500 MG tablet Take 1 tablet by mouth 4 times daily as needed for Pain 11/15/21 12/13/21  Prema Funk MD   ibuprofen (ADVIL;MOTRIN) 600 MG tablet Take 1 tablet by mouth 4 times daily as needed for Pain 11/15/21 12/13/21  Prema Funk MD   OXcarbazepine (TRILEPTAL) 150 MG tablet Take 150 mg by mouth 2 times daily    Historical Provider, MD   traZODone (DESYREL) 150 MG tablet Take 150 mg by mouth 2 times daily as needed for Sleep    Historical Provider, MD   Cariprazine HCl (VRAYLAR PO) Take by mouth    Historical Provider, MD   hydrOXYzine Pamoate (VISTARIL PO) Take by mouth 3 times daily    Historical Provider, MD   buPROPion HCl (WELLBUTRIN PO) Take by mouth    Historical Provider, MD   budesonide-formoterol (SYMBICORT) 80-4.5 MCG/ACT AERO Inhale 2 puffs into the lungs 2 times daily 19   Yolanda Mg MD   albuterol sulfate  (90 Base) MCG/ACT inhaler Inhale 2 puffs into the lungs 4 times daily as needed for Wheezing 9/6/19   Tommie Norris MD   Prenatal Multivit-Min-Fe-FA (PRENATAL VITAMINS) 0.8 MG TABS Take 1 tablet by mouth daily  Patient not taking: Reported on 9/6/2019 3/20/17   Nuria Álvarez MD   sertraline (ZOLOFT) 50 MG tablet Take 1 tablet by mouth daily  Patient not taking: Reported on 9/6/2019 7/6/16   Kirsten Dumas MD   nicotine (NICODERM CQ) 14 MG/24HR Place 1 patch onto the skin daily  Patient not taking: Reported on 9/6/2019 7/6/16   Kirsten Dumas MD       REVIEW OFSYSTEMS    (2-9 systems for level 4, 10 or more for level 5)      Review of Systems   Unable to perform ROS: Other     Altered mentation   PHYSICAL EXAM   (up to 7 for level 4, 8 or more forlevel 5)      INITIAL VITALS:   ED Triage Vitals   BP Temp Temp Source Pulse Resp SpO2 Height Weight   12/24/21 1048 12/24/21 1058 12/24/21 1054 12/24/21 1048 12/24/21 1048 12/24/21 1048 -- --   113/75 96.6 °F (35.9 °C) Oral 96 21 92 %         Physical Exam  Constitutional:       Appearance: She is not ill-appearing or diaphoretic. HENT:      Head: Normocephalic and atraumatic. Right Ear: External ear normal.      Left Ear: External ear normal.      Nose: No rhinorrhea. Mouth/Throat:      Mouth: Mucous membranes are moist.   Eyes:      Extraocular Movements: Extraocular movements intact. Conjunctiva/sclera: Conjunctivae normal.   Cardiovascular:      Rate and Rhythm: Regular rhythm. Tachycardia present. Pulmonary:      Effort: Pulmonary effort is normal. No respiratory distress. Abdominal:      General: Abdomen is flat. Musculoskeletal:         General: No tenderness or signs of injury. Normal range of motion. Skin:     General: Skin is warm. Capillary Refill: Capillary refill takes less than 2 seconds. Neurological:      Mental Status: She is alert. She is disoriented.          DIFFERENTIAL  DIAGNOSIS     PLAN (LABS / IMAGING / EKG):  Orders Placed This Encounter   Procedures    COVID-19, Rapid    XR of symptoms, patient is protecting airway upon initial evaluation, GCS of 9, saturating 92%. Patient able to be woken up with sternal rub, assisted in placing down on upon initial evaluation. However patient is unable to complete review of systems. Plan to get toxidrome labs including CMP, beta-hCG, CBC, troponin, EKG, speak with poison control    ED Course as of 12/24/21 1553   Fri Dec 24, 2021   1100 SpO2: 92 %  2 L nasal cannula, will get a Covid swab, chest x-ray. [GP]   1130 EEG shows concerns for profound QTc pronation, plan to give patient magnesium, calcium, trend  troponins, patient does have [GP]   1140 spoke [GP]   [phone] Patient requiring increased oxygen requirement, potassium is low, plan to supplement potassium as well. [GP]   3506 Patient received another dose of Narcan [GP]   1155 QTc interval and daily EKG was 462, plan to supplement potassium, check magnesium as well as replace magnesium with concerns for QTC prolongation. 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 Otherwise negative CT brain with no acute intracranial  abnormality.    [GP]   1351 Patient's repeat EKG shows improvement in QTC of his CT currently 3at 49 [GP]   1441 Patient intubated with concerns for impending respiratory, mild, hypoxia, patient underwent delayed sequence intubation with ketamine for fractionation on BiPAP.   Please see the note of Kang James DO  [GP]   2764 hCG Qual: NEGATIVE [GP]   1501 Patient received an milligrams of fentanyl for sedation off of work and significant [GP]   578 08 922 Et tube to be advanced 2cm [GP]      ED Course User Index  [GP] Moira Osman MD   Patient admitted to medical ICU post intubation:     DIAGNOSTIC RESULTS / [facility]     LABS:  Labs Reviewed   CBC WITH AUTO DIFFERENTIAL - Abnormal; Notable for the following components:       Result Value    WBC 15.9 (*)     Seg Neutrophils 90 (*)     Lymphocytes 6 (*)     Eosinophils % 0 (*)     Segs Absolute 14.20 (*)     Absolute Lymph # 0.87 (*)     All other components within normal limits   COMPREHENSIVE METABOLIC PANEL - Abnormal; Notable for the following components:    Glucose 153 (*)     CREATININE 1.02 (*)     Potassium 3.1 (*)     All other components within normal limits   TOX SCR, BLD, ED - Abnormal; Notable for the following components:    Acetaminophen Level <5 (*)     Salicylate Lvl <1 (*)     All other components within normal limits   URINE RT REFLEX TO CULTURE - Abnormal; Notable for the following components:    Turbidity UA Cloudy (*)     Protein, UA TRACE (*)     All other components within normal limits   URINE DRUG SCREEN - Abnormal; Notable for the following components:    Cocaine Metabolite, Urine POSITIVE (*)     Methadone Screen, Urine POSITIVE (*)     All other components within normal limits   MICROSCOPIC URINALYSIS - Abnormal; Notable for the following components:    Bacteria, UA MANY (*)     All other components within normal limits   COVID-19, RAPID   TROPONIN   HCG, SERUM, QUALITATIVE   MAGNESIUM   BLOOD GAS, VENOUS   POTASSIUM W/ REFLEX TO MAGNESIUM           CT HEAD WO CONTRAST    Result Date: 12/24/2021  EXAMINATION: CT OF THE HEAD WITHOUT CONTRAST  12/24/2021 12:45 pm TECHNIQUE: CT of the head was performed without the administration of intravenous contrast. Dose modulation, iterative reconstruction, and/or weight based adjustment of the mA/kV was utilized to reduce the radiation dose to as low as reasonably achievable. COMPARISON: 11/15/2021 HISTORY: Reason for Exam: ams FINDINGS: BRAIN/VENTRICLES: No acute intracranial hemorrhage, mass effect or midline shift. No abnormal extra-axial fluid collection. The gray-white differentiation is maintained without evidence of an acute infarct.   No evidence of CARE:  Please see attending note    FINAL IMPRESSION      1. Intentional drug overdose, initial encounter [facility]          [address] / [address] Admitted 12/24/2021 02:46:27 PM      PATIENT REFERRED TO:  No follow-up provider specified.     DISCHARGE MEDICATIONS:  New Prescriptions    No medications on file       Guero Palmer MD  Emergency Medicine Resident    (Please note that portions of this note were completed with a voice recognition program.Efforts were made to edit the dictations but occasionally words are mis-transcribed.)        Guero Palmer MD  Resident  12/24/21 [id number]

## 2021-12-24 NOTE — ED NOTES
Notified Dr. Cancino Liner of pt's SPO of 89%. Notified Respiratory of pt's SPO of 89%. Placed pt on 6 L of O2.        Duc Bang RN  12/24/21 2017

## 2021-12-24 NOTE — ED PROVIDER NOTES
[facility] ED     Emergency Department     Faculty Attestation        I performed a history and physical examination of the patient and discussed management with the resident. I reviewed the residents note and agree with the documented findings and plan of care. Any areas of disagreement are noted on the chart. I was personally present for the key portions of any procedures. I have documented in the chart those procedures where I was not present during the key portions. I have reviewed the emergency nurses triage note. I agree with the chief complaint, past medical history, past surgical history, allergies, medications, social and family history as documented unless otherwise noted below. For mid-level providers such as nurse practitioners as well as physicians assistants:    I have personally seen and evaluated the patient. I find the patient's history and physical exam are consistent with NP/PA documentation. I agree with the care provided, treatment rendered, disposition, & follow-up plan. Additional findings are as noted. Vital Signs: /75   Pulse 96   Temp 96.6 °F (35.9 °C) (Axillary)   Resp 21   LMP 12/17/2021   SpO2 92%   PCP:  No primary care provider on file. Pertinent Comments:     Overdose unclear if suicide attempt or not has presented in the past for this. She is somnolent weaned but maintaining her airway. She responds appropriate to pain and moves all 4 extremities. Will obtain tox work-up EKG. No signs suggest trauma. 2:43 PM EST    Patient's respiratory status consistently decreasing. She is on high flow and still only maintaining sats in the high 80s low 90s and is combative anytime BiPAP was placed. Patient was intubated using delayed sequence and tolerated the procedure well.       Due to the immediate potential for life-threatening deterioration due to overdose, respiratory distress requiring intubation, I spent 33 minutes providing critical care. This time is excluding time spent performing procedures.             Jil Novak MD    Attending Emergency Medicine Physician              Lydia Andujar MD  12/24/21 106 Nia Alvarez MD  12/24/21 [id number]

## 2021-12-24 NOTE — PLAN OF CARE
Problem: OXYGENATION/RESPIRATORY FUNCTION  Goal: Patient will maintain patent airway  Outcome: Ongoing  Goal: Patient will achieve/maintain normal respiratory rate/effort  Description: Respiratory rate and effort will be within normal limits for the patient  Outcome: Ongoing     Problem: MECHANICAL VENTILATION  Goal: Patient will maintain patent airway  Outcome: Ongoing  Goal: Oral health is maintained or improved  Outcome: Ongoing  Goal: ET tube will be managed safely  Outcome: Ongoing  Goal: Ability to express needs and understand communication  Outcome: Ongoing     Problem: SKIN INTEGRITY  Goal: Skin integrity is maintained or improved  Outcome: Ongoing

## 2021-12-24 NOTE — PROCEDURES
PROCEDURE NOTE - EMERGENCY INTUBATION    PATIENT NAME: [address] [place] [address] RECORD NO. [id number]  DATE: 12/24/2021  ATTENDING PHYSICIAN: Dr. Gertrude Choi DIAGNOSIS:  Acute Respiratory Failure  POSTOPERATIVE DIAGNOSIS:  Same  PROCEDURE PERFORMED:  Emergency endotracheal intubation  PERFORMING PHYSICIAN: Ailyn Rudolph DO    MEDICATIONS: ketamine intravenously and succinycholine intravenously    DISCUSSION:  Fitz Goodrich is a 29y.o.-year-old female who requires intubation and ventilatory support due to potential airway compromise. The history and physical examination were reviewed and confirmed. CONSENT: Unable to be obtained due to patient's condition. PROCEDURE: Patient given 2 mg/kg of ketamine and placed on BiPAP. Oxygen saturation increased to 100%. A timeout was initiated by the bedside nurse and was confirmed by those present. The patient was placed in the appropriate position. 100 mg of succinylcholine was given IV. Cricoid pressure was not required. Intubation was performed by glide scope laryngoscopy using a laryngoscope and a 7.5 cuffed endotracheal tube. The cuff was then inflated and the tube was secured appropriately at a distance of 22 cm to the dental ridge. Initial confirmation of placement included bilateral breath sounds, an end tidal CO2 detector, absence of sounds over the stomach and tube fogging. A chest x-ray to verify correct placement of the tube showed appropriate tube position. The patient tolerated the procedure well.      COMPLICATIONS:  None     Ailyn Rudolph DO  2:45 PM, 12/24/21

## 2021-12-24 NOTE — H&P
Critical Care - History and Physical Examination    Patient's name:  Rg Tenorio Record Number: [id number]  Patient's account/billing number: [de-identified]  Patient's YOB: 1985  Age: 29 y.o. Date of Admission: 12/24/2021 10:45 AM  Reason of ICU admission: TME 2/2 Drug Overdose  Date of History and Physical Examination: 12/24/2021    Primary Care Physician: No primary care provider on file. Attending Physician: Dr. Timothy Yepez    Code Status: Prior    Chief complaint:   Chief Complaint   Patient presents with    Drug Overdose       Reason for ICU admission: TME 2/2 Drug Overdose    History Of Present Illness:   History was obtained from chart review. Zara Samayoa is a 29 y.o.  female with a history of bipolar disease, schizphrenia, history of seizure, history of cocaine abuse came in today in the ED with AMS. Per EMS Patient was found down with pill bottles emptied including xanax, initial EMS evaluation patient was answering question, pupil were constricted and non-reactive and brought to the ED. Patient did receive 1 dose of narcan in the ED. Patient was tachycardiac, hypertensive and saturating around 80s, morphine 8 mg and fentanyl. Received 1 L bolus. Patient was on nasal cannula and high flow but saturation was not improving and was combative on BiPAP, due to poor mentation and combative nature patient was delayed sequence intubated. Initial evaluation on EKG showed QRS of around 700s received 2 g of Mg and QTc came down around 400s. Potential suicide attempt. Patient is intubated and sedated on propofol. Patient will be transferred to critical care for further management. Per chart reviewing pt is taking Wellbutrin 100 mg, BuSpar 20 mg 3 times daily, hydroxyzine 100 mg daily, Trileptal 150 mg, paliperidone 6 mg, prazosin 1 mg nightly, Seroquel 150 mg at night, tramadol 50 mg, trazodone 100/200 at night.       Past Medical History:        Diagnosis Date    ADHD     Allergy     dilaudid    Bipolar 1 disorder (HCC)     Bronchitis, chronic (HCC)     Cocaine abuse with intoxication with complication ([facility] 4/2/6347    Osteoarthritis     Osteoarthritis     Rh negative state in antepartum period 5/8/2012    Schizophrenia ([facility]     [facility] 7/5/2016    Spinal stenosis        Past Surgical History:        Procedure Laterality Date    CHOLECYSTECTOMY      DILATION AND CURETTAGE      2 x 2011 post partum hemorrhage & SAB    DILATION AND CURETTAGE OF UTERUS      DILATION AND CURETTAGE OF UTERUS      x3    HERNIA REPAIR      HERNIA REPAIR  2011    HERNIA REPAIR      x2    MANDIBLE FRACTURE SURGERY      MANDIBLE SURGERY      UPPER GASTROINTESTINAL ENDOSCOPY         Allergies: Allergies   Allergen Reactions    Dilaudid [Hydromorphone Hcl] Nausea Only and Other (See Comments)     Shaky and hot and cold flashes    Dilaudid [Hydromorphone Hcl]      Doesn't like the way it makes her feel         Home Medications:   Prior to Admission medications    Medication Sig Start Date End Date Taking?  Authorizing Provider   naproxen (NAPROSYN) 500 MG tablet Take 1 tablet by mouth 2 times daily as needed for Pain 12/13/21   Kisha De Los Santos, DO   Elastic Bandages & Supports (WRIST SPLINT/COCK-UP/LEFT M) MISC 1 each by Does not apply route as needed (wrist and hand pain) 12/13/21   Kisha De Los Santos, DO   Elastic Bandages & Supports (WRIST SPLINT/COCK-UP/RIGHT M) MISC 1 each by Does not apply route as needed (wrist//hand pain) 12/13/21   Kisha De Los Santos, DO   acetaminophen (TYLENOL) 500 MG tablet Take 1 tablet by mouth 4 times daily as needed for Pain 11/15/21 12/13/21  Karlo Dahl MD   ibuprofen (ADVIL;MOTRIN) 600 MG tablet Take 1 tablet by mouth 4 times daily as needed for Pain 11/15/21 12/13/21  Karlo Dahl MD   OXcarbazepine (TRILEPTAL) 150 MG tablet Take 150 mg by mouth 2 times daily    Historical Provider, MD   traZODone (DESYREL) 150 MG tablet Take 150 mg by mouth 2 times daily as needed for Sleep    Historical Provider, MD   Cariprazine HCl (VRAYLAR PO) Take by mouth    Historical Provider, MD   hydrOXYzine Pamoate (VISTARIL PO) Take by mouth 3 times daily    Historical Provider, MD   buPROPion HCl (WELLBUTRIN PO) Take by mouth    Historical Provider, MD   budesonide-formoterol (SYMBICORT) 80-4.5 MCG/ACT AERO Inhale 2 puffs into the lungs 2 times daily 19   Louise Sam MD   albuterol sulfate  (90 Base) MCG/ACT inhaler Inhale 2 puffs into the lungs 4 times daily as needed for Wheezing 19   Louise Sam MD   Prenatal Multivit-Min-Fe-FA (PRENATAL VITAMINS) 0.8 MG TABS Take 1 tablet by mouth daily  Patient not taking: Reported on 2019 3/20/17   Nevin Roy MD   sertraline (ZOLOFT) 50 MG tablet Take 1 tablet by mouth daily  Patient not taking: Reported on 2019   Catarina Lazo MD   nicotine (NICODERM CQ) 14 MG/24HR Place 1 patch onto the skin daily  Patient not taking: Reported on 2019   Catarina Lazo MD       Social History:   TOBACCO:   reports that she has been smoking cigarettes. She started smoking about 21 years ago. She has a 5.50 pack-year smoking history. She has never used smokeless tobacco.  ETOH:   reports no history of alcohol use. DRUGS:  reports no history of drug use.   OCCUPATION:      Family History:       Problem Relation Age of Onset    Cancer Maternal Grandmother     Diabetes Maternal Grandmother     Diabetes Maternal Grandfather     Hypertension Mother     Breast Cancer Neg Hx     Colon Cancer Neg Hx     Eclampsia Neg Hx     Ovarian Cancer Neg Hx      Labor Neg Hx     Spont Abortions Neg Hx     Stroke Neg Hx        REVIEW OF SYSTEMS (ROS):  Review of Systems   Unable to perform ROS: Intubated       Physical Exam:    Vitals: /75   Pulse 117   Temp 96.6 °F (35.9 °C) (Axillary)   Resp 26   Wt 170 lb (77.1 kg)   LMP 2021   SpO2 91%   BMI 27.44 kg/m²     Body for: TSH     Urinalysis:     Microbiology:  Cultures during this admission:   Blood cultures:                 [x] None drawn      [] Negative             []  Positive (Details:  )  Urine Culture:                   [] None drawn      [] Negative             []  Positive (Details: Pending)  Sputum Culture:               [x] None drawn       [] Negative             []  Positive (Details:  )   Endotracheal aspirate:     [x] None drawn       [] Negative             []  Positive (Details:  )   -----------------------------------------------------------------  Radiological reports:    CXR:    Electrocardiogram: EKG shows sinus tachycardia with prolonged QTC    Last Echocardiogram findings:     Assessment and Plan     Patient Active Problem List   Diagnosis    Current smoker    Schizophrenia ([facility]    Spinal stenosis    Convulsions ([facility]    Cocaine abuse in remission ([facility]    Marijuana use    Family history of diabetes mellitus    Encounter to establish care with new doctor    Chronic pain syndrome    Fatigue    History of bilateral tubal ligation    Drug overdose, intentional self-harm, initial encounter [facility])       Additional assessment:  Tali Glynn is a 29 y.o. female who intially presented on 12/24/2021 for   Chief Complaint   Patient presents with    Drug Overdose       PLAN/MEDICAL DECISION MAKING:  Neurologic:   Toxic metabolic encephalopathy secondary to drug overdose versus seizure:  · Intubated and Sedated  · Sedation: Propofol  · PRVC, RR: 16, VT: 470, PEEP: 8, FiO2 100%  · Dr. Daisha Stevenson is unremarkable. · UDS is positive for cocaine and methadone. · CT head without contrast showed stable benign left choroid plexus papilloma, patient did have a lumbar puncture in 2020 and the result was unremarkable. · Patient has history of seizure, diagnosed in 2016, patient was on Keppra 500 mg twice daily, home medication did not have Keppra at this time.     Pulmonary:  Acute respiratory failure secondary to TME  · Vent settings: RR 16  PEEP 8 FiO2 100  · Chest x-ray showed signs of possible aspiration  · WBC: 15.1.   · Unasyn 1 dose, reevaluate tomorrow    Renal/Fluid/Electrolyte  RIN secondary to prerenal/dehydration  · IV Fluids:  ml/hr. · Monitor I's and O's. · BUN/Cr: 15/1.02  · Monitor electrolytes, replace PRN     Hypokalemia:  K: 3.1, replaced  Keep Mg > 2 and K > 4. Cardiovascular:  · QRS: 760  · Received 1 dose of 2 g of magnesium, QRS came down around 400  · Hold QTc prolonging drugs. · Continue to monitor    GI/Nutrition  · NPO  · Ulcer Prophylaxis: Pepcid 20 mg BID  ·   ID  WBC:   Lab Results   Component Value Date    WBC 15.9 (H) 2021   ·   · Tmax: Temp (24hrs), Av.6 °F (35.9 °C), Min:96.6 °F (35.9 °C), Max:96.6 °F (35.9 °C)  · COVID-19 negative  · High risk of aspiration, will do 1 dose of Unasyn and reevaluate tomorrow. Hematology:  Recent Labs     21  1055   HGB 14.2   · Stable  Endocrine:   glucose controlled - most recent BGL is   Recent Labs     21  1055   GLUCOSE 153*   ·   DVT Prophylaxis  · Heparin 5000 units TID.      CODE STATUS: Prior    DISPOSITION:  [x] To remain ICU:   [] OK for out of ICU from [address] [facility]   PGY-2  Critical Care Team    2021  3:32 PM

## 2021-12-24 NOTE — ED PROVIDER NOTES
HCG, SERUM, QUALITATIVE   MAGNESIUM   BLOOD GAS, VENOUS   MICROSCOPIC URINALYSIS       CT HEAD WO CONTRAST    Result Date: 12/24/2021  EXAMINATION: CT OF THE HEAD WITHOUT CONTRAST  12/24/2021 12:45 pm TECHNIQUE: CT of the head was performed without the administration of intravenous contrast. Dose modulation, iterative reconstruction, and/or weight based adjustment of the mA/kV was utilized to reduce the radiation dose to as low as reasonably achievable. COMPARISON: 11/15/2021 HISTORY: Reason for Exam: ams FINDINGS: BRAIN/VENTRICLES: No acute intracranial hemorrhage, mass effect or midline shift. No abnormal extra-axial fluid collection. The gray-white differentiation is maintained without evidence of an acute infarct. No evidence of hydrocephalus. Stable benign left choroid plexus hyperdense lesion measuring approximately 1.5 cm. ORBITS: The visualized portion of the orbits demonstrate no acute abnormality. SINUSES: The visualized paranasal sinuses and mastoid air cells demonstrate no acute abnormality. SOFT TISSUES/SKULL:  No acute abnormality of the visualized skull or soft tissues. Stable benign left choroid plexus hyperdense lesion as measured above, possibly papilloma. Otherwise negative CT brain with no acute intracranial abnormality. XR CHEST PORTABLE    Result Date: 12/24/2021  EXAMINATION: ONE XRAY VIEW OF THE CHEST 12/24/2021 2:50 pm COMPARISON: AP chest from 12/24/2021 HISTORY: ORDERING SYSTEM PROVIDED HISTORY: post intubation TECHNOLOGIST PROVIDED HISTORY: post intubation History of drug overdose. FINDINGS: Enteric tube with tip and side hole below the left hemidiaphragm. ETT tip position 4.4 cm above the дмитрий, and satisfactory. Overlying ECG monitor leads. Cardiomediastinal shadow unchanged and midline. Basilar opacities, now greater at the left base, likely subsegmental atelectasis and/or infiltrate. Probable atelectasis medial right base.  Mildly increased markings but no additional localized abnormality, pneumothorax or large pleural effusion. Bones and soft tissues appear intact. Support tubes appear to be in satisfactory position. Worsening medial basilar opacities, especially left base, as above. XR CHEST PORTABLE    Result Date: 12/24/2021  EXAMINATION: ONE XRAY VIEW OF THE CHEST 12/24/2021 11:16 am COMPARISON: 01/07/2017 HISTORY: ORDERING SYSTEM PROVIDED HISTORY: drug overdose FINDINGS: The lungs are without acute focal process. There is no effusion or pneumothorax. The cardiomediastinal silhouette is normal.  The osseous structures are intact without acute process. Negative portable chest.       RECENT VITALS:     Temp: 96.6 °F (35.9 °C),  Pulse: 117, Resp: 26, BP: 116/82, SpO2: 93 %    This patient is a 29 y.o. Female with found down, presumed drug overdose. Intubated in the ICU originally , got 2 g IV magnesium repeat 350. On propofol and fentanyl infusion. Concern for suicidal ideation recently admitted for behavioral health issues      OUTSTANDING TASKS / RECOMMENDATIONS:    1. Bed pending ICU     FINAL IMPRESSION:     1. [facility]        DISPOSITION:         DISPOSITION:  []  Discharge   []  Transfer -    [x]  Admission -critical care   []  Against Medical Advice   []  Eloped   FOLLOW-UP: No follow-up provider specified.    DISCHARGE MEDICATIONS: New Prescriptions    No medications on file           Jair Gupta MD  Emergency Medicine Resident  [id number] Richard MD  Resident  12/24/21 0486

## 2021-12-24 NOTE — ED NOTES
Patient intubated 7.5 ET tube 23 at lip by Dr. Daniele Greenberg. Good color change of CO2 detector, condensation in the tube, equal bilateral breath sounds, no sounds over stomach.      Virgen Coronel RN  12/24/21 0086

## 2021-12-24 NOTE — PROGRESS NOTES
Charting intubations and reintubations    ETT Size (e.g. 7.5) 7.5  ETT Placement (e.g. 23 cm at lips) 23  ETT secured with (commercial device name) marbella    Tube placement verified by:  Auscultation (yes/no) yes  Positive color change on end tidal CO2 detector (yes/no) yes  CXR (yes/no) yes    Reason for intubation (jot a quick note/phrase e.g. Impending respiratory failure) airway protection    If difficult airway, was red tape placed (yes/no) no  If code situation, was rescue pod used? (yes/no) no    -- Notify charge therapist regarding all intubations, extubations, reintubations and self extubations    RUBENS Álvarez RCP  3:20 PM

## 2021-12-24 NOTE — ED NOTES
Bed: 15  Expected date:   Expected time:   Means of arrival:   Comments:  ANGELA 2     Murray Drew, RN  12/24/21 [id number]

## 2021-12-25 LAB
ABSOLUTE EOS #: 0.18 K/UL (ref 0–0.44)
ABSOLUTE IMMATURE GRANULOCYTE: 0.16 K/UL (ref 0–0.3)
ABSOLUTE LYMPH #: 1.41 K/UL (ref 1.1–3.7)
ABSOLUTE MONO #: 0.91 K/UL (ref 0.1–1.2)
ALLEN TEST: POSITIVE
ALLEN TEST: POSITIVE
ANION GAP SERPL CALCULATED.3IONS-SCNC: 10 MMOL/L (ref 9–17)
BASOPHILS # BLD: 0 % (ref 0–2)
BASOPHILS ABSOLUTE: 0.08 K/UL (ref 0–0.2)
BUN BLDV-MCNC: 16 MG/DL (ref 6–20)
BUN/CREAT BLD: ABNORMAL (ref 9–20)
CALCIUM SERPL-MCNC: 8.3 MG/DL (ref 8.6–10.4)
CHLORIDE BLD-SCNC: 115 MMOL/L (ref 98–107)
CO2: 20 MMOL/L (ref 20–31)
CREAT SERPL-MCNC: 0.92 MG/DL (ref 0.5–0.9)
DIFFERENTIAL TYPE: ABNORMAL
EOSINOPHILS RELATIVE PERCENT: 1 % (ref 1–4)
FIO2: 80
FIO2: ABNORMAL
GFR AFRICAN AMERICAN: >60 ML/MIN
GFR NON-AFRICAN AMERICAN: >60 ML/MIN
GFR SERPL CREATININE-BSD FRML MDRD: ABNORMAL ML/MIN/{1.73_M2}
GFR SERPL CREATININE-BSD FRML MDRD: ABNORMAL ML/MIN/{1.73_M2}
GLUCOSE BLD-MCNC: 84 MG/DL (ref 70–99)
GLUCOSE BLD-MCNC: 97 MG/DL (ref 74–100)
HCT VFR BLD CALC: 38.5 % (ref 36.3–47.1)
HEMOGLOBIN: 12.6 G/DL (ref 11.9–15.1)
IMMATURE GRANULOCYTES: 1 %
LACTIC ACID, WHOLE BLOOD: 1.6 MMOL/L (ref 0.7–2.1)
LYMPHOCYTES # BLD: 7 % (ref 24–43)
MCH RBC QN AUTO: 30.8 PG (ref 25.2–33.5)
MCHC RBC AUTO-ENTMCNC: 32.7 G/DL (ref 28.4–34.8)
MCV RBC AUTO: 94.1 FL (ref 82.6–102.9)
MODE: ABNORMAL
MODE: ABNORMAL
MONOCYTES # BLD: 5 % (ref 3–12)
NEGATIVE BASE EXCESS, ART: ABNORMAL (ref 0–2)
NEGATIVE BASE EXCESS, ART: ABNORMAL (ref 0–2)
NRBC AUTOMATED: 0 PER 100 WBC
O2 DEVICE/FLOW/%: ABNORMAL
O2 DEVICE/FLOW/%: ABNORMAL
PATIENT TEMP: ABNORMAL
PATIENT TEMP: ABNORMAL
PDW BLD-RTO: 13.8 % (ref 11.8–14.4)
PLATELET # BLD: 319 K/UL (ref 138–453)
PLATELET ESTIMATE: ABNORMAL
PMV BLD AUTO: 9.3 FL (ref 8.1–13.5)
POC HCO3: 24.5 MMOL/L (ref 21–28)
POC HCO3: 24.5 MMOL/L (ref 21–28)
POC LACTIC ACID: 0.82 MMOL/L (ref 0.56–1.39)
POC O2 SATURATION: 92 % (ref 94–98)
POC O2 SATURATION: 95 % (ref 94–98)
POC PCO2 TEMP: ABNORMAL MM HG
POC PCO2 TEMP: ABNORMAL MM HG
POC PCO2: 34.5 MM HG (ref 35–48)
POC PCO2: 37.3 MM HG (ref 35–48)
POC PH TEMP: ABNORMAL
POC PH TEMP: ABNORMAL
POC PH: 7.42 (ref 7.35–7.45)
POC PH: 7.46 (ref 7.35–7.45)
POC PO2 TEMP: ABNORMAL MM HG
POC PO2 TEMP: ABNORMAL MM HG
POC PO2: 58.5 MM HG (ref 83–108)
POC PO2: 75.6 MM HG (ref 83–108)
POSITIVE BASE EXCESS, ART: 0 (ref 0–3)
POSITIVE BASE EXCESS, ART: 1 (ref 0–3)
POTASSIUM SERPL-SCNC: 3.7 MMOL/L (ref 3.7–5.3)
RBC # BLD: 4.09 M/UL (ref 3.95–5.11)
RBC # BLD: ABNORMAL 10*6/UL
SAMPLE SITE: ABNORMAL
SAMPLE SITE: ABNORMAL
SEG NEUTROPHILS: 86 % (ref 36–65)
SEGMENTED NEUTROPHILS ABSOLUTE COUNT: 17.19 K/UL (ref 1.5–8.1)
SODIUM BLD-SCNC: 145 MMOL/L (ref 135–144)
TCO2 (CALC), ART: ABNORMAL MMOL/L (ref 22–29)
TCO2 (CALC), ART: ABNORMAL MMOL/L (ref 22–29)
TROPONIN INTERP: NORMAL
TROPONIN T: NORMAL NG/ML
TROPONIN, HIGH SENSITIVITY: <6 NG/L (ref 0–14)
WBC # BLD: 19.9 K/UL (ref 3.5–11.3)
WBC # BLD: ABNORMAL 10*3/UL

## 2021-12-25 PROCEDURE — 2700000000 HC OXYGEN THERAPY PER DAY

## 2021-12-25 PROCEDURE — 82947 ASSAY GLUCOSE BLOOD QUANT: CPT

## 2021-12-25 PROCEDURE — 85025 COMPLETE CBC W/AUTO DIFF WBC: CPT

## 2021-12-25 PROCEDURE — 87205 SMEAR GRAM STAIN: CPT

## 2021-12-25 PROCEDURE — 2500000003 HC RX 250 WO HCPCS: Performed by: STUDENT IN AN ORGANIZED HEALTH CARE EDUCATION/TRAINING PROGRAM

## 2021-12-25 PROCEDURE — 6360000002 HC RX W HCPCS: Performed by: STUDENT IN AN ORGANIZED HEALTH CARE EDUCATION/TRAINING PROGRAM

## 2021-12-25 PROCEDURE — 36600 WITHDRAWAL OF ARTERIAL BLOOD: CPT

## 2021-12-25 PROCEDURE — 82803 BLOOD GASES ANY COMBINATION: CPT

## 2021-12-25 PROCEDURE — 36415 COLL VENOUS BLD VENIPUNCTURE: CPT

## 2021-12-25 PROCEDURE — 80048 BASIC METABOLIC PNL TOTAL CA: CPT

## 2021-12-25 PROCEDURE — 94761 N-INVAS EAR/PLS OXIMETRY MLT: CPT

## 2021-12-25 PROCEDURE — 99291 CRITICAL CARE FIRST HOUR: CPT | Performed by: INTERNAL MEDICINE

## 2021-12-25 PROCEDURE — 83605 ASSAY OF LACTIC ACID: CPT

## 2021-12-25 PROCEDURE — 87070 CULTURE OTHR SPECIMN AEROBIC: CPT

## 2021-12-25 PROCEDURE — 87641 MR-STAPH DNA AMP PROBE: CPT

## 2021-12-25 PROCEDURE — 86403 PARTICLE AGGLUT ANTBDY SCRN: CPT

## 2021-12-25 PROCEDURE — 2000000000 HC ICU R&B

## 2021-12-25 PROCEDURE — 2580000003 HC RX 258: Performed by: STUDENT IN AN ORGANIZED HEALTH CARE EDUCATION/TRAINING PROGRAM

## 2021-12-25 PROCEDURE — 84484 ASSAY OF TROPONIN QUANT: CPT

## 2021-12-25 PROCEDURE — 89220 SPUTUM SPECIMEN COLLECTION: CPT

## 2021-12-25 PROCEDURE — 93005 ELECTROCARDIOGRAM TRACING: CPT | Performed by: EMERGENCY MEDICINE

## 2021-12-25 PROCEDURE — 87040 BLOOD CULTURE FOR BACTERIA: CPT

## 2021-12-25 PROCEDURE — 94003 VENT MGMT INPAT SUBQ DAY: CPT

## 2021-12-25 PROCEDURE — C9113 INJ PANTOPRAZOLE SODIUM, VIA: HCPCS | Performed by: STUDENT IN AN ORGANIZED HEALTH CARE EDUCATION/TRAINING PROGRAM

## 2021-12-25 RX ORDER — SODIUM CHLORIDE, SODIUM LACTATE, POTASSIUM CHLORIDE, CALCIUM CHLORIDE 600; 310; 30; 20 MG/100ML; MG/100ML; MG/100ML; MG/100ML
INJECTION, SOLUTION INTRAVENOUS CONTINUOUS
Status: DISCONTINUED | OUTPATIENT
Start: 2021-12-25 | End: 2021-12-27

## 2021-12-25 RX ORDER — MIDAZOLAM HYDROCHLORIDE 2 MG/2ML
2 INJECTION, SOLUTION INTRAMUSCULAR; INTRAVENOUS ONCE
Status: COMPLETED | OUTPATIENT
Start: 2021-12-26 | End: 2021-12-26

## 2021-12-25 RX ORDER — FENTANYL CITRATE 50 UG/ML
100 INJECTION, SOLUTION INTRAMUSCULAR; INTRAVENOUS
Status: DISCONTINUED | OUTPATIENT
Start: 2021-12-25 | End: 2021-12-25

## 2021-12-25 RX ORDER — FENTANYL CITRATE 50 UG/ML
50 INJECTION, SOLUTION INTRAMUSCULAR; INTRAVENOUS
Status: DISCONTINUED | OUTPATIENT
Start: 2021-12-25 | End: 2021-12-25

## 2021-12-25 RX ORDER — PANTOPRAZOLE SODIUM 40 MG/10ML
40 INJECTION, POWDER, LYOPHILIZED, FOR SOLUTION INTRAVENOUS 2 TIMES DAILY
Status: DISCONTINUED | OUTPATIENT
Start: 2021-12-25 | End: 2021-12-27

## 2021-12-25 RX ORDER — SODIUM CHLORIDE 9 MG/ML
10 INJECTION INTRAVENOUS ONCE
Status: COMPLETED | OUTPATIENT
Start: 2021-12-25 | End: 2021-12-25

## 2021-12-25 RX ORDER — SODIUM CHLORIDE, SODIUM LACTATE, POTASSIUM CHLORIDE, AND CALCIUM CHLORIDE .6; .31; .03; .02 G/100ML; G/100ML; G/100ML; G/100ML
1000 INJECTION, SOLUTION INTRAVENOUS ONCE
Status: COMPLETED | OUTPATIENT
Start: 2021-12-25 | End: 2021-12-25

## 2021-12-25 RX ADMIN — Medication 125 MCG/HR: at 20:06

## 2021-12-25 RX ADMIN — SODIUM CHLORIDE, PRESERVATIVE FREE 10 ML: 5 INJECTION INTRAVENOUS at 09:14

## 2021-12-25 RX ADMIN — SODIUM CHLORIDE, POTASSIUM CHLORIDE, SODIUM LACTATE AND CALCIUM CHLORIDE 1000 ML: 600; 310; 30; 20 INJECTION, SOLUTION INTRAVENOUS at 10:54

## 2021-12-25 RX ADMIN — HEPARIN SODIUM 5000 UNITS: 5000 INJECTION INTRAVENOUS; SUBCUTANEOUS at 22:23

## 2021-12-25 RX ADMIN — PROPOFOL 50 MCG/KG/MIN: 10 INJECTION, EMULSION INTRAVENOUS at 14:45

## 2021-12-25 RX ADMIN — PROPOFOL 70 MCG/KG/MIN: 10 INJECTION, EMULSION INTRAVENOUS at 04:40

## 2021-12-25 RX ADMIN — AMPICILLIN SODIUM AND SULBACTAM SODIUM 1500 MG: 1; .5 INJECTION, POWDER, FOR SOLUTION INTRAMUSCULAR; INTRAVENOUS at 17:08

## 2021-12-25 RX ADMIN — SODIUM CHLORIDE, POTASSIUM CHLORIDE, SODIUM LACTATE AND CALCIUM CHLORIDE: 600; 310; 30; 20 INJECTION, SOLUTION INTRAVENOUS at 18:43

## 2021-12-25 RX ADMIN — PROPOFOL 50 MCG/KG/MIN: 10 INJECTION, EMULSION INTRAVENOUS at 18:42

## 2021-12-25 RX ADMIN — AMPICILLIN SODIUM AND SULBACTAM SODIUM 1500 MG: 1; .5 INJECTION, POWDER, FOR SOLUTION INTRAMUSCULAR; INTRAVENOUS at 22:23

## 2021-12-25 RX ADMIN — PROPOFOL 50 MCG/KG/MIN: 10 INJECTION, EMULSION INTRAVENOUS at 11:13

## 2021-12-25 RX ADMIN — AMPICILLIN SODIUM AND SULBACTAM SODIUM 1500 MG: 1; .5 INJECTION, POWDER, FOR SOLUTION INTRAMUSCULAR; INTRAVENOUS at 12:01

## 2021-12-25 RX ADMIN — PROPOFOL 70 MCG/KG/MIN: 10 INJECTION, EMULSION INTRAVENOUS at 01:35

## 2021-12-25 RX ADMIN — PROPOFOL 50 MCG/KG/MIN: 10 INJECTION, EMULSION INTRAVENOUS at 07:35

## 2021-12-25 RX ADMIN — HEPARIN SODIUM 5000 UNITS: 5000 INJECTION INTRAVENOUS; SUBCUTANEOUS at 06:44

## 2021-12-25 RX ADMIN — HEPARIN SODIUM 5000 UNITS: 5000 INJECTION INTRAVENOUS; SUBCUTANEOUS at 14:34

## 2021-12-25 RX ADMIN — Medication 100 MCG/HR: at 11:13

## 2021-12-25 RX ADMIN — SODIUM CHLORIDE, PRESERVATIVE FREE 10 ML: 5 INJECTION INTRAVENOUS at 11:35

## 2021-12-25 RX ADMIN — FAMOTIDINE 20 MG: 10 INJECTION INTRAVENOUS at 09:14

## 2021-12-25 RX ADMIN — FENTANYL CITRATE 50 MCG: 50 INJECTION INTRAMUSCULAR; INTRAVENOUS at 09:04

## 2021-12-25 RX ADMIN — PANTOPRAZOLE SODIUM 40 MG: 40 INJECTION, POWDER, FOR SOLUTION INTRAVENOUS at 21:11

## 2021-12-25 RX ADMIN — SODIUM CHLORIDE, PRESERVATIVE FREE 10 ML: 5 INJECTION INTRAVENOUS at 21:11

## 2021-12-25 RX ADMIN — SODIUM CHLORIDE, POTASSIUM CHLORIDE, SODIUM LACTATE AND CALCIUM CHLORIDE: 600; 310; 30; 20 INJECTION, SOLUTION INTRAVENOUS at 10:54

## 2021-12-25 RX ADMIN — PANTOPRAZOLE SODIUM 40 MG: 40 INJECTION, POWDER, FOR SOLUTION INTRAVENOUS at 11:35

## 2021-12-25 ASSESSMENT — PULMONARY FUNCTION TESTS
PIF_VALUE: 23
PIF_VALUE: 28
PIF_VALUE: 23
PIF_VALUE: 18
PIF_VALUE: 15
PIF_VALUE: 19
PIF_VALUE: 18

## 2021-12-25 NOTE — PLAN OF CARE
Nutrition Problem #1: Inadequate oral intake  Intervention: Food and/or Nutrient Delivery: Continue NPO  Nutritional Goals: Meet greater than 50% of estimated nutrient needs

## 2021-12-25 NOTE — PROGRESS NOTES
INTENSIVE CARE UNIT  Resident Physician Progress Note    Patient - Fitz Goodrich  Date of Admission -  2021 10:45 AM  Date of Evaluation -  2021  Room and Bed Number -  [facility] 130 Day - 3 71-year-old female with a history of bipolar, schizophrenia, cocaine abuse, current smoker - who presented with overdose, altered mental status to the emergency department with a prolonged QTC, suspicion for Seroquel versus trazodone overdose, poison control was consulted did receive 1 dose of Narcan without any help, remained tachycardic and hypertensive with worsening altered mental status, was intubated for airway protection did receive a liter bolus and was started on ventilator with chest x-ray showing possible aspiration was covered with 1 dose of Unasyn. Repeat EKGs have been unremarkable x 3.      SUBJECTIVE:   `  OVERNIGHT EVENTS:    Temp of 100 °C overnight, unremarkable    TODAY: On sedation vacation localizes to pain but does not follow commands remains on propofol and Versed    AWAKE & FOLLOWING COMMANDS:  [x] No   [] Yes    SECRETIONS Amount:  [] Small [x] Moderate  [] Large  [] None  Color:     [x] White [] Colored  [] Bloody    SEDATION:  RAAS Score:  [x] Propofol gtt  [x] Versed gtt  [] Ativan gtt   [] No Sedation    PARALYZED:  [x] No    [] Yes    VASOPRESSORS:  [x] No    [] Yes  [] Levophed [] Dopamine [] Vasopressin  [] Dobutamine [] Phenylephrine [] Epinephrine      OBJECTIVE:     VITAL SIGNS:  /70   Pulse 91   Temp 100 °F (37.8 °C) (Oral)   Resp 26   Wt 170 lb (77.1 kg)   LMP 2021   SpO2 97%   BMI 27.44 kg/m²   Tmax over 24 hours:  Temp (24hrs), Av.3 °F (36.8 °C), Min:96.6 °F (35.9 °C), Max:100 °F (37.8 °C)      Patient Vitals for the past 8 hrs:   BP Temp Temp src Pulse Resp SpO2   21 0656 108/70 100 °F (37.8 °C) Oral 91 26 97 %   21 0600 107/69 98.8 °F (37.1 °C) Oral 88 27 98 %   21 0507 97/60 98.1 °F (36.7 °C) Oral 86 28 96 %   21 0570 106/76 98.4 °F (36.9 °C) -- 83 24 96 %   12/25/21 0400 109/71 98.4 °F (36.9 °C) -- 84 24 95 %   12/25/21 0346 -- -- -- 83 26 95 %   12/25/21 0300 102/65 98.8 °F (37.1 °C) Oral 83 24 96 %   12/25/21 0200 105/73 98.6 °F (37 °C) Oral 84 22 98 %   12/25/21 0100 103/63 98.4 °F (36.9 °C) Oral 86 20 96 %   12/25/21 0046 101/64 -- -- 84 23 93 %   12/25/21 0006 98/64 98.6 °F (37 °C) Oral -- 22 97 %         Intake/Output Summary (Last 24 hours) at 12/25/2021 0740  Last data filed at 12/24/2021 1706  Gross per 24 hour   Intake 1150 ml   Output 1000 ml   Net 150 ml       Wt Readings from Last 3 Encounters:   12/24/21 170 lb (77.1 kg)   12/13/21 171 lb (77.6 kg)   09/04/21 180 lb (81.6 kg)     Body mass index is 27.44 kg/m².         PHYSICAL EXAM:  GEN:  Intubated, sedated localizing to pain  EYES: Pupils equal round reactive  HEENT:  No signs of trauma  LUNGS:  Mild rales and left lower lobe  CV:    Regular rate and rhythm  ABDOMEN:   Soft abdomen  MSK:    Mild bilateral bruising in the lower extremities  NEURO[de-identified]   Localizing to pain  SKIN:   No signs of rash  EXTREMITIES:  No erythema, distal pulses intact       MEDICATIONS:  Scheduled Meds:   sodium chloride flush  5-40 mL IntraVENous 2 times per day    heparin (porcine)  5,000 Units SubCUTAneous 3 times per day    famotidine (PEPCID) injection  20 mg IntraVENous BID     Continuous Infusions:   propofol 50 mcg/kg/min (12/25/21 0735)    sodium chloride      sodium chloride 100 mL/hr at 12/24/21 2129    midazolam 1 mg/hr (12/24/21 1624)     PRN Meds:   sodium chloride flush, 5-40 mL, PRN  sodium chloride, 25 mL, PRN  ondansetron, 4 mg, Q8H PRN   Or  ondansetron, 4 mg, Q6H PRN  polyethylene glycol, 17 g, Daily PRN  acetaminophen, 650 mg, Q6H PRN   Or  acetaminophen, 650 mg, Q6H PRN        SUPPORT DEVICES: [x] Ventilator [] BIPAP  [] Nasal Cannula [] Room Air    VENT SETTINGS (Comprehensive) (if applicable):    Vent Information  $Ventilation: $Initial Day  Vent Type: C2G5 Ortiz  Vt Ordered: 470 mL  Rate Set: 16 bmp  FiO2 : (S) 80 %  SpO2: 97 %  SpO2/FiO2 ratio: 95  PEEP/CPAP: 8  I Time/ I Time %: 0.9 s  Humidification Source: HME  Additional Respiratory  Assessments  Pulse: 91  Resp: 26  SpO2: 97 %  End Tidal CO2: 28 (%)  Position: Supine  Humidification Source: HME  Oral Care Completed?: Yes  Oral Care: Mouth suctioned    ABGs:     Lab Results   Component Value Date    BLQ6NKW NOT REPORTED 12/25/2021    FIO2 NOT REPORTED 12/25/2021         DATA:  Complete Blood Count:   Recent Labs     12/24/21  1055 12/25/21  0724   WBC 15.9* 19.9*   RBC 4.67 4.09   HGB 14.2 12.6   HCT 42.1 38.5   MCV 90.1 94.1   MCH 30.4 30.8   MCHC 33.7 32.7   RDW 13.5 13.8    319   MPV 9.2 9.3        Last 3 Blood Glucose:   Recent Labs     12/24/21  1055   GLUCOSE 153*        PT/INR:    Lab Results   Component Value Date    PROTIME 10.8 04/06/2012    INR 1.0 04/06/2012     PTT:    Lab Results   Component Value Date    APTT 26.3 04/06/2012       Comprehensive Metabolic Profile:   Recent Labs     12/24/21  1055 12/24/21  1954     --    K 3.1* 3.2*     --    CO2 24  --    BUN 15  --    CREATININE 1.02*  --    GLUCOSE 153*  --    CALCIUM 10.0  --    PROT 6.7  --    LABALBU 4.3  --    BILITOT 0.61  --    ALKPHOS 68  --    AST 15  --    ALT 11  --       Magnesium:   Lab Results   Component Value Date    MG 1.9 12/24/2021    MG 2.1 12/24/2021     Phosphorus: No results found for: PHOS  Ionized Calcium: No results found for: CAION     Urinalysis:   Lab Results   Component Value Date    NITRU NEGATIVE 12/24/2021    COLORU Yellow 12/24/2021    PHUR 7.0 12/24/2021    WBCUA 2 TO 5 12/24/2021    RBCUA 2 TO 5 12/24/2021    MUCUS NOT REPORTED 12/24/2021    TRICHOMONAS NOT REPORTED 12/24/2021    YEAST NOT REPORTED 12/24/2021    BACTERIA MANY 12/24/2021    CLARITYU SL CLOUDY 01/21/2014    SPECGRAV 1.016 12/24/2021    LEUKOCYTESUR NEGATIVE 12/24/2021    UROBILINOGEN Normal 12/24/2021    BILIRUBINUR NEGATIVE 12/24/2021    BLOODU TRACE-INTACT 01/21/2014    GLUCOSEU NEGATIVE 12/24/2021    KETUA NEGATIVE 12/24/2021    AMORPHOUS NOT REPORTED 12/24/2021       HgBA1c:  No results found for: LABA1C  TSH:  No results found for: TSH  Lactic Acid: No results found for: LACTA   Troponin: No results for input(s): TROPONINI in the last 72 hours. Microbiology:  Urine Culture:  No components found for: CURINE  Blood Culture:  No components found for: CBLOOD, CFUNGUSBL      Other Labs:  CBC:   Lab Results   Component Value Date    WBC 19.9 12/25/2021    RBC 4.09 12/25/2021    RBC 4.02 04/06/2012    HGB 12.6 12/25/2021    HCT 38.5 12/25/2021    MCV 94.1 12/25/2021    MCH 30.8 12/25/2021    MCHC 32.7 12/25/2021    RDW 13.8 12/25/2021     12/25/2021     04/06/2012    MPV 9.3 12/25/2021     BMP:    Lab Results   Component Value Date     12/25/2021    K 3.7 12/25/2021     12/25/2021    CO2 20 12/25/2021    BUN 16 12/25/2021    LABALBU 4.3 12/24/2021    CREATININE 0.92 12/25/2021    CALCIUM 8.3 12/25/2021    GFRAA >60 12/25/2021    LABGLOM >60 12/25/2021    GLUCOSE 84 12/25/2021    GLUCOSE 63 04/06/2012         CT HEAD WO CONTRAST    Result Date: 12/24/2021  Stable benign left choroid plexus hyperdense lesion as measured above, possibly papilloma. Otherwise negative CT brain with no acute intracranial abnormality. XR CHEST PORTABLE    Result Date: 12/24/2021  Support tubes appear to be in satisfactory position. Worsening medial basilar opacities, especially left base, as above.      XR CHEST PORTABLE    Result Date: 12/24/2021  Negative portable chest.       ASSESSMENT:     Patient Active Problem List    Diagnosis Date Noted    Drug overdose, intentional self-harm, initial encounter ([facility] 12/24/2021    Cocaine abuse in remission ([facility] 09/06/2019    Marijuana use 09/06/2019    Family history of diabetes mellitus 09/06/2019    Encounter to establish care with new doctor 09/06/2019    Chronic pain syndrome 09/06/2019    Fatigue 09/06/2019    History of bilateral tubal ligation 09/06/2019    Convulsions ([facility] 07/05/2016    Schizophrenia ([facility] 05/08/2012    Spinal stenosis 05/08/2012    Current smoker 05/01/2012          PLAN:       Assessment:  Toxic metabolic encephalopathy secondary to ingestion  Altered mental status  Aspiration pneumonia        Plan:  Continue sedation vacations, sedation weaning and start CPAP wean  Reduce propofol as tolerated  Did receive 1 dose of Unasyn, discontinued  Ventilation: 16, 470, 8, 80%   Blood gas: 7.4, 34, 58  QTC stable at 459, repeat EKG is unremarkable  Resolving RIN with a BUN of 16, creatinine of 0.9 down from 1  Discontinue normal saline as patient is getting hypernatremic and hyperchloremic  Start tube feeds  Continue Pepcid and heparin for DVT and PUD prophylaxis   Leukocytosis likely reactive -monitor off antibiotics  If patient becomes febrile with a fever of over 100.4 will get cultures and re-start Unasyn  Keep in ICU        WEAN PER PROTOCOL:  [x] No   [] Yes  [] N/A    ICU PROPHYLAXIS:  Stress ulcer:  [] PPI Agent  [x] P5Icbal [] Sucralfate  [] Other:  VTE:   [x] Enoxaparin  [] Unfract. Heparin Subcut  [] EPC Cuffs    NUTRITION:  [] NPO [x] Tube Feeding (Specify: ) [] TPN  [] PO (Diet: Diet NPO)    INSULIN DRIP:   [x] No   [] Yes  -------------------------------------------------------------------------------------------------------------------------------------    CONSULTATION NEEDED:  [x] No  [] Yes    FAMILY UPDATED:    [x] No  [] Yes    TRANSFER OUT OF ICU:   [x] No  [] Yes        Marie Dumont MD  Critical Care Resident   Emergency Medicine   12/25/2021 7:40 AM  Attending Physician Statement  I have discussed the care of Cassie Arriaga, including pertinent history and exam findings,  with the resident. I have seen and examined the patient and the key elements of all parts of the encounter have been performed by me.   I agree with the

## 2021-12-25 NOTE — PROGRESS NOTES
Comprehensive Nutrition Assessment    Type and Reason for Visit:  Initial,Consult (TF ordering and management)    Nutrition Recommendations/Plan:    - TF recommendations (with current rate of Propofol) = Peptide based (Vital AF 1.2), goal of 45 mL/hr.      - TF recs without Propofol = Standard without fiber (Osmolite 1.2), goal of 65 mL/hr    Nutrition Assessment:  Pt currently intubated and sedated d/t AMS. Noted suspicion of drug overdose per EHR. Spoke with MD who reports no current plan to start TF today, but requests TF recommendations. Estimated Daily Nutrient Needs:  Energy (kcal):  4263-9514 kcals/day; Weight Used for Energy Requirements:  Current     Protein (g):  70-80 gm/day; Weight Used for Protein Requirements:  Ideal (1.2-1.3)        Fluid (ml/day):  1628-0907 mL/day or per MD; Method Used for Fluid Requirements:  ml/Kg (30-35)      Nutrition Related Findings:  meds/labs reviewed      Wounds:  None       Current Nutrition Therapies:    Diet NPO  Additional Calorie Sources:   Propofol at 22.7 mL/hr = 599 kcals/day    Anthropometric Measures:  · Height: 5' 6\" (167.6 cm)  · Current Body Weight: 166 lb 7.2 oz (75.5 kg)   · Ideal Body Weight: 130 lbs; % Ideal Body Weight 128 %   · BMI: 26.9  · BMI Categories: Overweight (BMI 25.0-29. 9)       Nutrition Diagnosis:   · Inadequate oral intake related to impaired respiratory function as evidenced by NPO or clear liquid status due to medical condition      Nutrition Interventions:   Food and/or Nutrient Delivery:  Continue NPO  Nutrition Education/Counseling:  No recommendation at this time   Coordination of Nutrition Care:  Continue to monitor while inpatient    Goals:  Meet greater than 50% of estimated nutrient needs       Nutrition Monitoring and Evaluation:   Behavioral-Environmental Outcomes:  None Identified   Food/Nutrient Intake Outcomes:  Diet Advancement/Tolerance  Physical Signs/Symptoms Outcomes:  Weight,Biochemical Data,Nutrition Focused Physical Findings     Discharge Planning:     Too soon to determine     Electronically signed by Joni Strickland MS, RD, LD on 12/25/21 at 11:32 AM EST    Contact: [phone]

## 2021-12-25 NOTE — PROGRESS NOTES
RN called patient's only emergency contact, Ani (aunt), and verified patient with Ani. Ani states Jasmin Cherry is not , has no kids 25 or over, not , father  and mother is Mitchell Reyes - number [phone]. RN called patient's mother, Mary Carmen Juarez and verified patient with her. RN notified Mary Carmen Juarez of the patient's admission and hospital location. RN also provided number to call for updates and informed her of patient identifiers. All questions answered at this time.

## 2021-12-25 NOTE — PLAN OF CARE
Problem: Non-Violent Restraints  Goal: No harm/injury to patient while restraints in use  Outcome: Ongoing  Goal: Patient's dignity will be maintained  Outcome: Ongoing     Problem: OXYGENATION/RESPIRATORY FUNCTION  Goal: Patient will maintain patent airway  Outcome: Ongoing  Intervention: POSITION PATIENT FOR MAXIMUM VENTILATORY EFFICIENCY  12/25/2021 0852 by Radha Matos RCP  Note: Problem: OXYGENATION/RESPIRATORY FUNCTION  Goal: Patient will maintain patent airway  Outcome: Ongoing  Goal: Patient will achieve/maintain normal respiratory rate/effort  Respiratory rate and effort will be within normal limits for the patient    Outcome: Ongoing     Problem: MECHANICAL VENTILATION  Goal: Patient will maintain patent airway  Outcome: Ongoing  Goal: Oral health is maintained or improved  Outcome: Ongoing  Goal: ET tube will be managed safely  Outcome: Ongoing  Goal: Ability to express needs and understand communication  Outcome: Ongoing  Goal: Mobility/activity is maintained at optimum level for patient  Outcome: Ongoing     Problem: ASPIRATION PRECAUTIONS  Goal: Patients risk of aspiration is minimized  Outcome: Ongoing     Problem: SKIN INTEGRITY  Goal: Skin integrity is maintained or improved  Outcome: Ongoing   Goal: Patient will achieve/maintain normal respiratory rate/effort  Description: Respiratory rate and effort will be within normal limits for the patient  Outcome: Ongoing     Problem: MECHANICAL VENTILATION  Goal: Patient will maintain patent airway  Outcome: Ongoing  Goal: Oral health is maintained or improved  Outcome: Ongoing  Goal: ET tube will be managed safely  Outcome: Ongoing  Goal: Ability to express needs and understand communication  Outcome: Ongoing     Problem: SKIN INTEGRITY  Goal: Skin integrity is maintained or improved  Outcome: Ongoing     Problem: Nutrition  Goal: Optimal nutrition therapy  12/25/2021 1308 by Aden Rodriguez RN  Outcome: Ongoing  12/25/2021 1134 by Lucille Madrid Laila, MS, RD, LD  Outcome: Ongoing  Note: Nutrition Problem #1: Inadequate oral intake  Intervention: Food and/or Nutrient Delivery: Continue NPO  Nutritional Goals: Meet greater than 50% of estimated nutrient needs      Problem: Skin Integrity:  Goal: Will show no infection signs and symptoms  Description: Will show no infection signs and symptoms  Outcome: Ongoing  Goal: Absence of new skin breakdown  Description: Absence of new skin breakdown  Outcome: Ongoing     Problem: Falls - Risk of:  Goal: Will remain free from falls  Description: Will remain free from falls  Outcome: Ongoing  Goal: Absence of physical injury  Description: Absence of physical injury  Outcome: Ongoing     Problem: Non-Violent Restraints  Goal: Removal from restraints as soon as assessed to be safe  Outcome: Not Met This Shift

## 2021-12-25 NOTE — PLAN OF CARE
Problem: OXYGENATION/RESPIRATORY FUNCTION  Goal: Patient will maintain patent airway  12/25/2021 1719 by Lena Gutiérrez  Outcome: Ongoing     Problem: OXYGENATION/RESPIRATORY FUNCTION  Goal: Patient will achieve/maintain normal respiratory rate/effort  Description: Respiratory rate and effort will be within normal limits for the patient  12/25/2021 1719 by Lena Gutiérrez  Outcome: Ongoing     Problem: MECHANICAL VENTILATION  Goal: Patient will maintain patent airway  12/25/2021 1719 by Lena Gutiérrez  Outcome: Ongoing     Problem: MECHANICAL VENTILATION  Goal: Oral health is maintained or improved  12/25/2021 1719 by Lena Gutiérrez  Outcome: Ongoing     Problem: MECHANICAL VENTILATION  Goal: ET tube will be managed safely  12/25/2021 1719 by Lena Gutiérrez  Outcome: Ongoing     Problem: MECHANICAL VENTILATION  Goal: Ability to express needs and understand communication  12/25/2021 1719 by Lena Gutiérrez  Outcome: Ongoing     Problem: SKIN INTEGRITY  Goal: Skin integrity is maintained or improved  12/25/2021 1719 by Lena Gutiérrez  Outcome: Ongoing

## 2021-12-25 NOTE — PROGRESS NOTES
12/25/21 1205   Cough/Sputum   Sputum How Obtained Endotracheal;Suctioned   $Obtained Sample $Induced Sputum   Cough Strong;Productive   Sputum Amount Moderate   Sputum Color Creamy; Yellow   Tenacity Thick     Sputum collected and sent to lab for culture.

## 2021-12-26 LAB
ABSOLUTE EOS #: 0.43 K/UL (ref 0–0.44)
ABSOLUTE IMMATURE GRANULOCYTE: 0.07 K/UL (ref 0–0.3)
ABSOLUTE LYMPH #: 1.75 K/UL (ref 1.1–3.7)
ABSOLUTE MONO #: 0.73 K/UL (ref 0.1–1.2)
ALLEN TEST: POSITIVE
ANION GAP SERPL CALCULATED.3IONS-SCNC: 8 MMOL/L (ref 9–17)
BASOPHILS # BLD: 1 % (ref 0–2)
BASOPHILS ABSOLUTE: 0.07 K/UL (ref 0–0.2)
BUN BLDV-MCNC: 15 MG/DL (ref 6–20)
BUN/CREAT BLD: ABNORMAL (ref 9–20)
CALCIUM SERPL-MCNC: 8.2 MG/DL (ref 8.6–10.4)
CHLORIDE BLD-SCNC: 113 MMOL/L (ref 98–107)
CO2: 19 MMOL/L (ref 20–31)
CREAT SERPL-MCNC: 0.77 MG/DL (ref 0.5–0.9)
DIFFERENTIAL TYPE: ABNORMAL
EOSINOPHILS RELATIVE PERCENT: 3 % (ref 1–4)
FIO2: 50
GFR AFRICAN AMERICAN: >60 ML/MIN
GFR NON-AFRICAN AMERICAN: >60 ML/MIN
GFR SERPL CREATININE-BSD FRML MDRD: ABNORMAL ML/MIN/{1.73_M2}
GFR SERPL CREATININE-BSD FRML MDRD: ABNORMAL ML/MIN/{1.73_M2}
GLUCOSE BLD-MCNC: 116 MG/DL (ref 65–105)
GLUCOSE BLD-MCNC: 118 MG/DL (ref 65–105)
GLUCOSE BLD-MCNC: 53 MG/DL (ref 65–105)
GLUCOSE BLD-MCNC: 53 MG/DL (ref 65–105)
GLUCOSE BLD-MCNC: 62 MG/DL (ref 65–105)
GLUCOSE BLD-MCNC: 65 MG/DL (ref 65–105)
GLUCOSE BLD-MCNC: 74 MG/DL (ref 65–105)
GLUCOSE BLD-MCNC: 83 MG/DL (ref 65–105)
GLUCOSE BLD-MCNC: 92 MG/DL (ref 70–99)
GLUCOSE BLD-MCNC: 95 MG/DL (ref 74–100)
GLUCOSE BLD-MCNC: 96 MG/DL (ref 65–105)
HCT VFR BLD CALC: 39.6 % (ref 36.3–47.1)
HEMOGLOBIN: 12.1 G/DL (ref 11.9–15.1)
IMMATURE GRANULOCYTES: 1 %
LYMPHOCYTES # BLD: 12 % (ref 24–43)
MCH RBC QN AUTO: 30 PG (ref 25.2–33.5)
MCHC RBC AUTO-ENTMCNC: 30.6 G/DL (ref 28.4–34.8)
MCV RBC AUTO: 98 FL (ref 82.6–102.9)
MODE: ABNORMAL
MONOCYTES # BLD: 5 % (ref 3–12)
MRSA, DNA, NASAL: NORMAL
NEGATIVE BASE EXCESS, ART: ABNORMAL (ref 0–2)
NRBC AUTOMATED: 0 PER 100 WBC
O2 DEVICE/FLOW/%: ABNORMAL
PATIENT TEMP: ABNORMAL
PDW BLD-RTO: 13.8 % (ref 11.8–14.4)
PLATELET # BLD: 246 K/UL (ref 138–453)
PLATELET ESTIMATE: ABNORMAL
PMV BLD AUTO: 9.7 FL (ref 8.1–13.5)
POC HCO3: 26.7 MMOL/L (ref 21–28)
POC O2 SATURATION: 93 % (ref 94–98)
POC PCO2 TEMP: ABNORMAL MM HG
POC PCO2: 45.4 MM HG (ref 35–48)
POC PH TEMP: ABNORMAL
POC PH: 7.38 (ref 7.35–7.45)
POC PO2 TEMP: ABNORMAL MM HG
POC PO2: 70.2 MM HG (ref 83–108)
POSITIVE BASE EXCESS, ART: 1 (ref 0–3)
POTASSIUM SERPL-SCNC: 3.6 MMOL/L (ref 3.7–5.3)
RBC # BLD: 4.04 M/UL (ref 3.95–5.11)
RBC # BLD: ABNORMAL 10*6/UL
SAMPLE SITE: ABNORMAL
SEG NEUTROPHILS: 78 % (ref 36–65)
SEGMENTED NEUTROPHILS ABSOLUTE COUNT: 11.01 K/UL (ref 1.5–8.1)
SODIUM BLD-SCNC: 140 MMOL/L (ref 135–144)
SPECIMEN DESCRIPTION: NORMAL
TCO2 (CALC), ART: ABNORMAL MMOL/L (ref 22–29)
WBC # BLD: 14.1 K/UL (ref 3.5–11.3)
WBC # BLD: ABNORMAL 10*3/UL

## 2021-12-26 PROCEDURE — 2580000003 HC RX 258: Performed by: STUDENT IN AN ORGANIZED HEALTH CARE EDUCATION/TRAINING PROGRAM

## 2021-12-26 PROCEDURE — 6360000002 HC RX W HCPCS: Performed by: STUDENT IN AN ORGANIZED HEALTH CARE EDUCATION/TRAINING PROGRAM

## 2021-12-26 PROCEDURE — 82803 BLOOD GASES ANY COMBINATION: CPT

## 2021-12-26 PROCEDURE — 94761 N-INVAS EAR/PLS OXIMETRY MLT: CPT

## 2021-12-26 PROCEDURE — 2500000003 HC RX 250 WO HCPCS: Performed by: STUDENT IN AN ORGANIZED HEALTH CARE EDUCATION/TRAINING PROGRAM

## 2021-12-26 PROCEDURE — 36600 WITHDRAWAL OF ARTERIAL BLOOD: CPT

## 2021-12-26 PROCEDURE — C9113 INJ PANTOPRAZOLE SODIUM, VIA: HCPCS | Performed by: STUDENT IN AN ORGANIZED HEALTH CARE EDUCATION/TRAINING PROGRAM

## 2021-12-26 PROCEDURE — 2000000000 HC ICU R&B

## 2021-12-26 PROCEDURE — 2700000000 HC OXYGEN THERAPY PER DAY

## 2021-12-26 PROCEDURE — 93005 ELECTROCARDIOGRAM TRACING: CPT | Performed by: STUDENT IN AN ORGANIZED HEALTH CARE EDUCATION/TRAINING PROGRAM

## 2021-12-26 PROCEDURE — 99291 CRITICAL CARE FIRST HOUR: CPT | Performed by: INTERNAL MEDICINE

## 2021-12-26 PROCEDURE — 94003 VENT MGMT INPAT SUBQ DAY: CPT

## 2021-12-26 PROCEDURE — 36415 COLL VENOUS BLD VENIPUNCTURE: CPT

## 2021-12-26 PROCEDURE — 85025 COMPLETE CBC W/AUTO DIFF WBC: CPT

## 2021-12-26 PROCEDURE — 80048 BASIC METABOLIC PNL TOTAL CA: CPT

## 2021-12-26 PROCEDURE — 6370000000 HC RX 637 (ALT 250 FOR IP): Performed by: STUDENT IN AN ORGANIZED HEALTH CARE EDUCATION/TRAINING PROGRAM

## 2021-12-26 PROCEDURE — 82947 ASSAY GLUCOSE BLOOD QUANT: CPT

## 2021-12-26 RX ORDER — DEXTROSE MONOHYDRATE 25 G/50ML
12.5 INJECTION, SOLUTION INTRAVENOUS PRN
Status: DISCONTINUED | OUTPATIENT
Start: 2021-12-26 | End: 2021-12-29 | Stop reason: HOSPADM

## 2021-12-26 RX ORDER — BUTALBITAL, ACETAMINOPHEN AND CAFFEINE 50; 325; 40 MG/1; MG/1; MG/1
1 TABLET ORAL ONCE
Status: COMPLETED | OUTPATIENT
Start: 2021-12-26 | End: 2021-12-26

## 2021-12-26 RX ORDER — DEXTROSE MONOHYDRATE 50 MG/ML
100 INJECTION, SOLUTION INTRAVENOUS PRN
Status: DISCONTINUED | OUTPATIENT
Start: 2021-12-26 | End: 2021-12-29 | Stop reason: HOSPADM

## 2021-12-26 RX ORDER — NICOTINE POLACRILEX 4 MG
15 LOZENGE BUCCAL PRN
Status: DISCONTINUED | OUTPATIENT
Start: 2021-12-26 | End: 2021-12-29 | Stop reason: HOSPADM

## 2021-12-26 RX ORDER — MIDAZOLAM HYDROCHLORIDE 2 MG/2ML
2 INJECTION, SOLUTION INTRAMUSCULAR; INTRAVENOUS ONCE
Status: COMPLETED | OUTPATIENT
Start: 2021-12-26 | End: 2021-12-26

## 2021-12-26 RX ADMIN — DEXTROSE MONOHYDRATE 12.5 G: 25 INJECTION, SOLUTION INTRAVENOUS at 02:47

## 2021-12-26 RX ADMIN — SODIUM CHLORIDE, PRESERVATIVE FREE 10 ML: 5 INJECTION INTRAVENOUS at 09:56

## 2021-12-26 RX ADMIN — PROPOFOL 45 MCG/KG/MIN: 10 INJECTION, EMULSION INTRAVENOUS at 07:48

## 2021-12-26 RX ADMIN — AMPICILLIN SODIUM AND SULBACTAM SODIUM 1500 MG: 1; .5 INJECTION, POWDER, FOR SOLUTION INTRAMUSCULAR; INTRAVENOUS at 23:35

## 2021-12-26 RX ADMIN — HEPARIN SODIUM 5000 UNITS: 5000 INJECTION INTRAVENOUS; SUBCUTANEOUS at 07:47

## 2021-12-26 RX ADMIN — Medication 125 MCG/HR: at 06:33

## 2021-12-26 RX ADMIN — DEXTROSE MONOHYDRATE 100 ML/HR: 50 INJECTION, SOLUTION INTRAVENOUS at 08:49

## 2021-12-26 RX ADMIN — HEPARIN SODIUM 5000 UNITS: 5000 INJECTION INTRAVENOUS; SUBCUTANEOUS at 21:01

## 2021-12-26 RX ADMIN — DEXTROSE MONOHYDRATE 100 ML/HR: 50 INJECTION, SOLUTION INTRAVENOUS at 02:47

## 2021-12-26 RX ADMIN — PROPOFOL 50 MCG/KG/MIN: 10 INJECTION, EMULSION INTRAVENOUS at 00:04

## 2021-12-26 RX ADMIN — AMPICILLIN SODIUM AND SULBACTAM SODIUM 1500 MG: 1; .5 INJECTION, POWDER, FOR SOLUTION INTRAMUSCULAR; INTRAVENOUS at 17:40

## 2021-12-26 RX ADMIN — DEXTROSE MONOHYDRATE 12.5 G: 25 INJECTION, SOLUTION INTRAVENOUS at 08:51

## 2021-12-26 RX ADMIN — AMPICILLIN SODIUM AND SULBACTAM SODIUM 1500 MG: 1; .5 INJECTION, POWDER, FOR SOLUTION INTRAMUSCULAR; INTRAVENOUS at 11:07

## 2021-12-26 RX ADMIN — Medication 200 MCG/HR: at 01:55

## 2021-12-26 RX ADMIN — PROPOFOL 50 MCG/KG/MIN: 10 INJECTION, EMULSION INTRAVENOUS at 03:17

## 2021-12-26 RX ADMIN — MIDAZOLAM HYDROCHLORIDE 2 MG: 1 INJECTION, SOLUTION INTRAMUSCULAR; INTRAVENOUS at 00:00

## 2021-12-26 RX ADMIN — AMPICILLIN SODIUM AND SULBACTAM SODIUM 1500 MG: 1; .5 INJECTION, POWDER, FOR SOLUTION INTRAMUSCULAR; INTRAVENOUS at 05:28

## 2021-12-26 RX ADMIN — PANTOPRAZOLE SODIUM 40 MG: 40 INJECTION, POWDER, FOR SOLUTION INTRAVENOUS at 21:01

## 2021-12-26 RX ADMIN — DEXTROSE MONOHYDRATE 12.5 G: 25 INJECTION, SOLUTION INTRAVENOUS at 11:05

## 2021-12-26 RX ADMIN — PANTOPRAZOLE SODIUM 40 MG: 40 INJECTION, POWDER, FOR SOLUTION INTRAVENOUS at 09:56

## 2021-12-26 RX ADMIN — MIDAZOLAM HYDROCHLORIDE 2 MG: 1 INJECTION, SOLUTION INTRAMUSCULAR; INTRAVENOUS at 01:49

## 2021-12-26 RX ADMIN — SODIUM CHLORIDE, PRESERVATIVE FREE 10 ML: 5 INJECTION INTRAVENOUS at 21:01

## 2021-12-26 RX ADMIN — BUTALBITAL, ACETAMINOPHEN AND CAFFEINE 1 TABLET: 50; 325; 40 TABLET ORAL at 15:05

## 2021-12-26 RX ADMIN — SODIUM CHLORIDE, PRESERVATIVE FREE 10 ML: 5 INJECTION INTRAVENOUS at 10:06

## 2021-12-26 RX ADMIN — HEPARIN SODIUM 5000 UNITS: 5000 INJECTION INTRAVENOUS; SUBCUTANEOUS at 14:44

## 2021-12-26 ASSESSMENT — PULMONARY FUNCTION TESTS
PIF_VALUE: 19
PIF_VALUE: 12
PIF_VALUE: 20

## 2021-12-26 ASSESSMENT — PAIN DESCRIPTION - PAIN TYPE
TYPE: CHRONIC PAIN
TYPE: CHRONIC PAIN

## 2021-12-26 ASSESSMENT — PAIN SCALES - GENERAL
PAINLEVEL_OUTOF10: 7
PAINLEVEL_OUTOF10: 5

## 2021-12-26 ASSESSMENT — PAIN DESCRIPTION - LOCATION
LOCATION: BACK
LOCATION: BACK

## 2021-12-26 NOTE — PROGRESS NOTES
Speech Language Pathology  [address] [facility]  Speech Language Pathology    Date: 12/26/2021  Patient Name: Mendel Lips  YOB: 1985   AGE: 29 y.o. MRN: [id number]        Patient Not Available for Speech Therapy     Due to:  [] Testing  [] Hemodialysis  [] Cancelled by RN  [] Surgery   [] Intubation/Sedation/Pain Medication  [] Medical instability  [x] Other: ST ordered for clinical swallow evaluation. Spoke with RN who relates pt passed RN swallow screen. RN to notify ST if any changes occur and BSSE is needed. Next scheduled treatment: Available for re-consult as needed.      Completed by: Mina Hayward, SLP

## 2021-12-26 NOTE — PLAN OF CARE
Problem: OXYGENATION/RESPIRATORY FUNCTION  Goal: Patient will maintain patent airway  12/25/2021 2208 by Maria M Correa RCP  Outcome: Ongoing     Problem: OXYGENATION/RESPIRATORY FUNCTION  Goal: Patient will achieve/maintain normal respiratory rate/effort  Description: Respiratory rate and effort will be within normal limits for the patient  12/25/2021 2208 by Maria M Correa RCP  Outcome: Ongoing     Problem: MECHANICAL VENTILATION  Goal: Patient will maintain patent airway  12/25/2021 2208 by Maria M Correa RCP  Outcome: Ongoing     Problem: MECHANICAL VENTILATION  Goal: Oral health is maintained or improved  12/25/2021 2208 by Maria M Correa RCP  Outcome: Ongoing     Problem: MECHANICAL VENTILATION  Goal: ET tube will be managed safely  12/25/2021 2208 by Maria M Correa RCP  Outcome: Ongoing     Problem: MECHANICAL VENTILATION  Goal: Ability to express needs and understand communication  12/25/2021 2208 by Maria M Correa RCP  Outcome: Ongoing     Problem: SKIN INTEGRITY  Goal: Skin integrity is maintained or improved  12/25/2021 2208 by Maria M Correa RCP  Outcome: Ongoing

## 2021-12-26 NOTE — CARE COORDINATION
Case Management Initial Discharge Plan  Brandon Patterson,             Met with:patient,pt's mother and sister at bedside to discuss discharge plans. Information verified: address, contacts, phone number, , insurance Yes  Insurance Provider: [facility]    Emergency Contact/Next of Kin name & number:   Mary Chester (aunt) [phone], Robbi Ro (mother) [phone], Rajinder Dumont (sister) [phone]  Who are involved in patient's support system? Mother and sister    PCP: No primary care provider on file. Date of last visit:       Discharge Planning    Living Arrangements:  Other (Comment) (ex- boyfriend)     Home is apartment   3 stairs to climb to get into front door,   Location of bedroom/bathroom in home main    Patient able to perform ADL's:Independent    Current Services (outpatient & in home) none  DME equipment: none  DME provider: n/a    Is patient receiving oral anticoagulation therapy? No    If indicated:   Physician managing anticoagulation treatment: n/a  Where does patient obtain lab work for ATC treatment? n/a      Potential Assistance Needed:       Patient agreeable to home care: No  [place] of choice provided:  n/a    Prior SNF/Rehab Placement and Facility: n/a  Agreeable to SNF/Rehab: No  [place] of choice provided: n/a     Evaluation: yes    Expected Discharge date:       Patient expects to be discharged to: If home: is the family and/or caregiver wiling & able to provide support at home? No, does not want to return to live with her ex-boyfriend  Who will be providing this support? Follow Up Appointment: Best Day/ Time:      Transportation provider:   Transportation arrangements needed for discharge: Yes    Readmission Risk              Risk of Unplanned Readmission:  20             Does patient have a readmission risk score greater than 14?: Yes  If yes, follow-up appointment must be made within 7 days of discharge.      Goals of Care:       Educated patient, mother and sister on transitional options, provided freedom of choice and are agreeable with plan      Discharge Plan: Will need psych evaluation, most likely will need BHI.           Electronically signed by Janett Dominguez RN on 12/26/21 at 3:37 PM EST

## 2021-12-26 NOTE — PROGRESS NOTES
RT switched patient to PS of 8 and CPAP of 8 due to patient's dyssynchrony on PRVC. Patient tolerating well. No longer dyssynchronous and getting appropriate tidal volumes. Will get an ABG in a half hour and continue to monitor.

## 2021-12-26 NOTE — PROGRESS NOTES
Ativan gtt   [] No Sedation    PARALYZED:  [x] No    [] Yes    DIARRHEA:                [x] No                [] Yes  (C. Difficile status: [] positive                                                                                                                       [] negative                                                                                                                     [] pending)    VASOPRESSORS:  [x] No    [] Yes    If yes -   [] Levophed       [] Dopamine     [] Vasopressin       [] Dobutamine  [] Phenylephrine         [] Epinephrine    CENTRAL LINES:     [x] No   [] Yes   (Date of Insertion:   )           If yes -     [] Right IJ     [] Left IJ [] Right Femoral [] Left Femoral                   [] Right Subclavian [] Left Subclavian       MAIN'S CATHETER:   [] No   [x] Yes  (Date of Insertion:   )     URINE OUTPUT:            [x] Good   [] Low              [] Anuric      OBJECTIVE:     VITAL SIGNS:  /70   Pulse 74   Temp 98.2 °F (36.8 °C) (Oral)   Resp 20   Ht 5' 6\" (1.676 m)   Wt 173 lb 1 oz (78.5 kg)   LMP 2021   SpO2 96%   BMI 27.93 kg/m²   Tmax over 24 hours:  Temp (24hrs), Av.3 °F (37.4 °C), Min:98.2 °F (36.8 °C), Max:100.6 °F (38.1 °C)      Patient Vitals for the past 6 hrs:   BP Temp Temp src Pulse Resp SpO2 Weight   21 0832 -- -- -- 74 20 96 % --   21 0700 109/70 -- -- 79 19 94 % --   21 0600 112/72 -- -- 77 16 93 % 173 lb 1 oz (78.5 kg)   21 0500 108/71 -- -- 77 17 94 % --   21 0430 -- -- -- -- 16 98 % --   21 0400 102/71 98.2 °F (36.8 °C) Oral 80 18 95 % --   21 0329 -- -- -- 82 16 99 % --   21 0322 -- -- -- 84 15 99 % --   21 0300 109/73 -- -- 79 16 100 % --         Intake/Output Summary (Last 24 hours) at 2021 0842  Last data filed at 2021 0600  Gross per 24 hour   Intake 4771.4 ml   Output 1652 ml   Net 3119.4 ml     Wt Readings from Last 2 Encounters:   21 173 lb 1 oz (78.5 kg) 12/13/21 171 lb (77.6 kg)     Body mass index is 27.93 kg/m². PHYSICAL EXAMINATION:  Constitutional: Appears well, no distress  EENT: PERRLA, EOMI, sclera clear, no lesions, neck supple with midline trachea. Neck: Supple, symmetrical, trachea midline, no adenopathy,  no jvd, skin normal  Respiratory: clear to auscultation, no wheezes or rales and unlabored breathing.   Cardiovascular: regular rate and rhythm, normal S1, S2, no murmur noted   Abdomen: soft, nontender, nondistended, no masses or organomegaly  Extremities:  peripheral pulses normal, no pedal edema, no clubbing or cyanosis  Neuro: moves all 4 extremities     Any additional physical findings:    MEDICATIONS:    Scheduled Meds:   ampicillin-sulbactam  1,500 mg IntraVENous Q6H    pantoprazole  40 mg IntraVENous BID    sodium chloride flush  5-40 mL IntraVENous 2 times per day    heparin (porcine)  5,000 Units SubCUTAneous 3 times per day     Continuous Infusions:   dextrose 100 mL/hr (12/26/21 0247)    fentaNYL 125 mcg/hr (12/26/21 0640)    lactated ringers Stopped (12/26/21 0247)    propofol 45 mcg/kg/min (12/26/21 0748)    sodium chloride      midazolam Stopped (12/25/21 1200)     PRN Meds:   glucose, 15 g, PRN  dextrose, 12.5 g, PRN  glucagon (rDNA), 1 mg, PRN  dextrose, 100 mL/hr, PRN  sodium chloride flush, 5-40 mL, PRN  sodium chloride, 25 mL, PRN  ondansetron, 4 mg, Q8H PRN   Or  ondansetron, 4 mg, Q6H PRN  polyethylene glycol, 17 g, Daily PRN  acetaminophen, 650 mg, Q6H PRN   Or  acetaminophen, 650 mg, Q6H PRN          VENT SETTINGS (Comprehensive) (if applicable):  Vent Information  $Ventilation: $Subsequent Day  Skin Assessment: Clean, dry, & intact  Suction Catheter Diameter: 14  Equipment Changed: HME  Vent Type: Servo i  Vent Mode:  (CPAP/PS)  Vt Ordered: 415 mL  Rate Set: 16 bmp  Pressure Support: (S) 6 cmH20  FiO2 : 40 %  SpO2: 96 %  SpO2/FiO2 ratio: 240  Sensitivity: 3  PEEP/CPAP: (S) 5  I Time/ I Time %: 0.7 s  Humidification Source: HME  Nitric Oxide/Epoprostenol In Use?: No  Additional Respiratory  Assessments  Pulse: 74  Resp: 20  SpO2: 96 %  End Tidal CO2: 33 (%)  Position: Semi-Arreguin's  Humidification Source: HME  Oral Care Completed?: Yes  Oral Care: Mouthwash,Mouth swabbed,Mouth suctioned  Subglottic Suction Done?: No      Laboratory findings:    Complete Blood Count:   Recent Labs     12/24/21  1055 12/25/21 0724 12/26/21  0413   WBC 15.9* 19.9* 14.1*   HGB 14.2 12.6 12.1   HCT 42.1 38.5 39.6    319 246        Last 3 Blood Glucose:   Recent Labs     12/24/21  1055 12/25/21 0724 12/26/21  0413   GLUCOSE 153* 84 92        PT/INR:    Lab Results   Component Value Date    PROTIME 10.8 04/06/2012    INR 1.0 04/06/2012     PTT:    Lab Results   Component Value Date    APTT 26.3 04/06/2012       Comprehensive Metabolic Profile:   Recent Labs     12/24/21  1055 12/24/21  1055 12/24/21 1954 12/25/21 0724 12/26/21  0413     --   --  145* 140   K 3.1*   < > 3.2* 3.7 3.6*     --   --  115* 113*   CO2 24  --   --  20 19*   BUN 15  --   --  16 15   CREATININE 1.02*  --   --  0.92* 0.77   GLUCOSE 153*  --   --  84 92   CALCIUM 10.0  --   --  8.3* 8.2*   PROT 6.7  --   --   --   --    LABALBU 4.3  --   --   --   --    BILITOT 0.61  --   --   --   --    ALKPHOS 68  --   --   --   --    AST 15  --   --   --   --    ALT 11  --   --   --   --     < > = values in this interval not displayed. Magnesium:   Lab Results   Component Value Date    MG 1.9 12/24/2021     Phosphorus: No results found for: PHOS  Ionized Calcium: No results found for: CAION     Urinalysis:     Troponin: No results for input(s): TROPONINI in the last 72 hours.     Microbiology:    Cultures during this admission:     Blood cultures:                 [] None drawn      [] Negative             []  Positive (Details:  )  Urine Culture:                   [] None drawn      [] Negative             []  Positive (Details:  )  Sputum Culture: [] None drawn       [] Negative             []  Positive (Details:  )   Endotracheal aspirate:     [] None drawn       [] Negative             []  Positive (Details:  )     Other pertinent Labs:       Radiology/Imaging:       ASSESSMENT:     Active Problems:    Drug overdose, intentional self-harm, initial encounter ([facility] [address]  Resolved Problems:    * No resolved hospital problems. *            PLAN:     · Toxic metabolic encephalopathy secondary to ingestion:S/p Narcan. · Hypoglycemia  · Sepsis? ? > CBC pending 15.9 >19.9> 14.1    Plan:  · Currently on Unasyn. Blood culture no growth for 12 hours. Sputum culture mixed berenice. · Urine output 1.4 L in last 24-hour. · Patient extubated , passed swallow study . Eating and drinking fine. Will dc fluids .            [id number] [facility] Pres[facility] MD   PGY 2  Department of [facility]         12/26/2021, 8:42 AM

## 2021-12-26 NOTE — PLAN OF CARE
Problem: Non-Violent Restraints  Goal: No harm/injury to patient while restraints in use  Outcome: Met This Shift  Goal: Patient's dignity will be maintained  Outcome: Met This Shift     Problem: Non-Violent Restraints  Goal: Removal from restraints as soon as assessed to be safe  Outcome: Not Met This Shift     Problem: OXYGENATION/RESPIRATORY FUNCTION  Goal: Patient will maintain patent airway  12/26/2021 0703 by Donna Hills RN  Outcome: Ongoing  12/25/2021 2208 by Haley Makrs RCP  Outcome: Ongoing  12/25/2021 1719 by Felicia Vanegas  Outcome: Ongoing  Goal: Patient will achieve/maintain normal respiratory rate/effort  Description: Respiratory rate and effort will be within normal limits for the patient  12/26/2021 0703 by Donna Hills RN  Outcome: Ongoing  12/25/2021 2208 by Haley Marks RCP  Outcome: Ongoing  12/25/2021 1719 by Felicia Vanegas  Outcome: Ongoing     Problem: MECHANICAL VENTILATION  Goal: Patient will maintain patent airway  12/26/2021 0703 by Donna Hills RN  Outcome: Ongoing  12/25/2021 2208 by Haley Marks RCP  Outcome: Ongoing  12/25/2021 1719 by Felicia Vanegas  Outcome: Ongoing  Goal: Oral health is maintained or improved  12/26/2021 0703 by Donna Hills RN  Outcome: Ongoing  12/25/2021 2208 by Haley Marks RCP  Outcome: Ongoing  12/25/2021 1719 by Felicia Vanegas  Outcome: Ongoing  Goal: ET tube will be managed safely  12/26/2021 0703 by Donna Hills RN  Outcome: Ongoing  12/25/2021 2208 by Haley Marks RCP  Outcome: Ongoing  12/25/2021 1719 by Felicia Vanegas  Outcome: Ongoing  Goal: Ability to express needs and understand communication  12/26/2021 0703 by Donna Hills RN  Outcome: Ongoing  12/25/2021 2208 by Haley Marks RCP  Outcome: Ongoing  12/25/2021 1719 by Felicia Vanegas  Outcome: Ongoing     Problem: SKIN INTEGRITY  Goal: Skin integrity is maintained or improved  12/26/2021 0703 by Donna Olp, RN  Outcome: Ongoing  12/25/2021 2208 by Pranav Bonilla QAMAR  Outcome: Ongoing  12/25/2021 1719 by Pal Busby  Outcome: Ongoing     Problem: Nutrition  Goal: Optimal nutrition therapy  Outcome: Ongoing     Problem: Skin Integrity:  Goal: Will show no infection signs and symptoms  Description: Will show no infection signs and symptoms  Outcome: Ongoing  Goal: Absence of new skin breakdown  Description: Absence of new skin breakdown  Outcome: Ongoing     Problem: Falls - Risk of:  Goal: Will remain free from falls  Description: Will remain free from falls  Outcome: Ongoing  Goal: Absence of physical injury  Description: Absence of physical injury  Outcome: Ongoing

## 2021-12-26 NOTE — PROGRESS NOTES
Attending Physician Statement  I have discussed the care of Chet Tineo, including pertinent history and exam findings,  with the resident. I have seen and examined the patient and the key elements of all parts of the encounter have been performed by me. I agree with the assessment, plan and orders as documented by the resident with additions . acacia sbt   Fio2 dec   Sec better   Off sedation following commands   Extubate   Cont unasyn          Total critical care time caring for this patient with life threatening, unstable organ failure, including direct patient contact, management of life support systems, review of data including imaging and labs, discussions with other team members and physicians at least 27   Min so far today, excluding procedures. Treatment plan Discussed with nursing staff in detail , all questions answered . Electronically signed by Aurelio Barbosa MD on   12/26/21 at 5:48 PM EST    Please note that this chart was generated using voice recognition Dragon dictation software. Although every effort was made to ensure the accuracy of this automated transcription, some errors in transcription may have occurred.

## 2021-12-26 NOTE — PROGRESS NOTES
Order obtain for extubation. SpO2 of 100 on 40% FiO2. Patient extubated and placed on 4 liters/min via nasal cannula. Post extubation SpO2 is 93% with HR  82 bpm and RR 24 breaths/min. Patient had moderate cough that was productive of thick creamy sputum. Extubation Well tolerated by patient. .   Breath Sounds: clear/diminished    Arn Promise   11:55 AM

## 2021-12-26 NOTE — PLAN OF CARE
Problem: OXYGENATION/RESPIRATORY FUNCTION  Goal: Patient will maintain patent airway  12/26/2021 0948 by Maxine Duff  Outcome: Ongoing     Problem: OXYGENATION/RESPIRATORY FUNCTION  Goal: Patient will achieve/maintain normal respiratory rate/effort  Description: Respiratory rate and effort will be within normal limits for the patient  12/26/2021 0948 by Maxine Duff  Outcome: Ongoing     Problem: MECHANICAL VENTILATION  Goal: Patient will maintain patent airway  12/26/2021 0948 by Maxine Duff  Outcome: Ongoing     Problem: MECHANICAL VENTILATION  Goal: Oral health is maintained or improved  12/26/2021 0948 by Maxine Duff  Outcome: Ongoing     Problem: MECHANICAL VENTILATION  Goal: ET tube will be managed safely  12/26/2021 0948 by Maxine Duff  Outcome: Ongoing     Problem: MECHANICAL VENTILATION  Goal: Ability to express needs and understand communication  12/26/2021 0948 by Maxine Duff  Outcome: Ongoing     Problem: SKIN INTEGRITY  Goal: Skin integrity is maintained or improved  12/26/2021 0948 by Maxine Duff  Outcome: Ongoing

## 2021-12-27 PROBLEM — J69.0 ASPIRATION PNEUMONIA (HCC): Status: ACTIVE | Noted: 2021-12-27

## 2021-12-27 LAB
ABSOLUTE EOS #: 0.48 K/UL (ref 0–0.44)
ABSOLUTE IMMATURE GRANULOCYTE: 0.08 K/UL (ref 0–0.3)
ABSOLUTE LYMPH #: 1.57 K/UL (ref 1.1–3.7)
ABSOLUTE MONO #: 0.78 K/UL (ref 0.1–1.2)
ANION GAP SERPL CALCULATED.3IONS-SCNC: 10 MMOL/L (ref 9–17)
BASOPHILS # BLD: 0 % (ref 0–2)
BASOPHILS ABSOLUTE: 0.03 K/UL (ref 0–0.2)
BUN BLDV-MCNC: 11 MG/DL (ref 6–20)
BUN/CREAT BLD: ABNORMAL (ref 9–20)
CALCIUM SERPL-MCNC: 8.2 MG/DL (ref 8.6–10.4)
CHLORIDE BLD-SCNC: 110 MMOL/L (ref 98–107)
CO2: 22 MMOL/L (ref 20–31)
CREAT SERPL-MCNC: 0.83 MG/DL (ref 0.5–0.9)
CULTURE: ABNORMAL
DIFFERENTIAL TYPE: ABNORMAL
DIRECT EXAM: ABNORMAL
EKG ATRIAL RATE: 73 BPM
EKG ATRIAL RATE: 79 BPM
EKG ATRIAL RATE: 92 BPM
EKG ATRIAL RATE: 95 BPM
EKG ATRIAL RATE: 95 BPM
EKG ATRIAL RATE: 97 BPM
EKG P AXIS: 50 DEGREES
EKG P AXIS: 59 DEGREES
EKG P AXIS: 67 DEGREES
EKG P AXIS: 71 DEGREES
EKG P AXIS: 74 DEGREES
EKG P AXIS: 77 DEGREES
EKG P-R INTERVAL: 146 MS
EKG P-R INTERVAL: 148 MS
EKG P-R INTERVAL: 150 MS
EKG P-R INTERVAL: 152 MS
EKG P-R INTERVAL: 156 MS
EKG P-R INTERVAL: 166 MS
EKG Q-T INTERVAL: 278 MS
EKG Q-T INTERVAL: 286 MS
EKG Q-T INTERVAL: 362 MS
EKG Q-T INTERVAL: 388 MS
EKG Q-T INTERVAL: 412 MS
EKG Q-T INTERVAL: 562 MS
EKG QRS DURATION: 76 MS
EKG QRS DURATION: 84 MS
EKG QRS DURATION: 86 MS
EKG QRS DURATION: 86 MS
EKG QRS DURATION: 90 MS
EKG QRS DURATION: 90 MS
EKG QTC CALCULATION (BAZETT): 349 MS
EKG QTC CALCULATION (BAZETT): 353 MS
EKG QTC CALCULATION (BAZETT): 444 MS
EKG QTC CALCULATION (BAZETT): 453 MS
EKG QTC CALCULATION (BAZETT): 459 MS
EKG QTC CALCULATION (BAZETT): 706 MS
EKG R AXIS: 69 DEGREES
EKG R AXIS: 69 DEGREES
EKG R AXIS: 72 DEGREES
EKG R AXIS: 75 DEGREES
EKG R AXIS: 79 DEGREES
EKG R AXIS: 85 DEGREES
EKG T AXIS: 26 DEGREES
EKG T AXIS: 42 DEGREES
EKG T AXIS: 45 DEGREES
EKG T AXIS: 55 DEGREES
EKG T AXIS: 71 DEGREES
EKG T AXIS: 78 DEGREES
EKG VENTRICULAR RATE: 73 BPM
EKG VENTRICULAR RATE: 79 BPM
EKG VENTRICULAR RATE: 92 BPM
EKG VENTRICULAR RATE: 95 BPM
EKG VENTRICULAR RATE: 95 BPM
EKG VENTRICULAR RATE: 97 BPM
EOSINOPHILS RELATIVE PERCENT: 4 % (ref 1–4)
GFR AFRICAN AMERICAN: >60 ML/MIN
GFR NON-AFRICAN AMERICAN: >60 ML/MIN
GFR SERPL CREATININE-BSD FRML MDRD: ABNORMAL ML/MIN/{1.73_M2}
GFR SERPL CREATININE-BSD FRML MDRD: ABNORMAL ML/MIN/{1.73_M2}
GLUCOSE BLD-MCNC: 126 MG/DL (ref 70–99)
HCT VFR BLD CALC: 37.9 % (ref 36.3–47.1)
HEMOGLOBIN: 12.3 G/DL (ref 11.9–15.1)
IMMATURE GRANULOCYTES: 1 %
LV EF: 55 %
LVEF MODALITY: NORMAL
LYMPHOCYTES # BLD: 14 % (ref 24–43)
Lab: ABNORMAL
MAGNESIUM: 1.9 MG/DL (ref 1.6–2.6)
MCH RBC QN AUTO: 30.1 PG (ref 25.2–33.5)
MCHC RBC AUTO-ENTMCNC: 32.5 G/DL (ref 28.4–34.8)
MCV RBC AUTO: 92.7 FL (ref 82.6–102.9)
MONOCYTES # BLD: 7 % (ref 3–12)
NRBC AUTOMATED: 0 PER 100 WBC
PDW BLD-RTO: 13.2 % (ref 11.8–14.4)
PLATELET # BLD: 264 K/UL (ref 138–453)
PLATELET ESTIMATE: ABNORMAL
PMV BLD AUTO: 9.9 FL (ref 8.1–13.5)
POTASSIUM SERPL-SCNC: 3.5 MMOL/L (ref 3.7–5.3)
PROCALCITONIN: 0.11 NG/ML
RBC # BLD: 4.09 M/UL (ref 3.95–5.11)
RBC # BLD: ABNORMAL 10*6/UL
SEG NEUTROPHILS: 75 % (ref 36–65)
SEGMENTED NEUTROPHILS ABSOLUTE COUNT: 8.66 K/UL (ref 1.5–8.1)
SODIUM BLD-SCNC: 142 MMOL/L (ref 135–144)
SPECIMEN DESCRIPTION: ABNORMAL
WBC # BLD: 11.6 K/UL (ref 3.5–11.3)
WBC # BLD: ABNORMAL 10*3/UL

## 2021-12-27 PROCEDURE — 99291 CRITICAL CARE FIRST HOUR: CPT | Performed by: INTERNAL MEDICINE

## 2021-12-27 PROCEDURE — 80048 BASIC METABOLIC PNL TOTAL CA: CPT

## 2021-12-27 PROCEDURE — 84145 PROCALCITONIN (PCT): CPT

## 2021-12-27 PROCEDURE — 6360000002 HC RX W HCPCS: Performed by: STUDENT IN AN ORGANIZED HEALTH CARE EDUCATION/TRAINING PROGRAM

## 2021-12-27 PROCEDURE — 83735 ASSAY OF MAGNESIUM: CPT

## 2021-12-27 PROCEDURE — 1200000000 HC SEMI PRIVATE

## 2021-12-27 PROCEDURE — 93010 ELECTROCARDIOGRAM REPORT: CPT | Performed by: INTERNAL MEDICINE

## 2021-12-27 PROCEDURE — 2580000003 HC RX 258: Performed by: STUDENT IN AN ORGANIZED HEALTH CARE EDUCATION/TRAINING PROGRAM

## 2021-12-27 PROCEDURE — 6370000000 HC RX 637 (ALT 250 FOR IP): Performed by: STUDENT IN AN ORGANIZED HEALTH CARE EDUCATION/TRAINING PROGRAM

## 2021-12-27 PROCEDURE — 36415 COLL VENOUS BLD VENIPUNCTURE: CPT

## 2021-12-27 PROCEDURE — 93306 TTE W/DOPPLER COMPLETE: CPT

## 2021-12-27 PROCEDURE — 6370000000 HC RX 637 (ALT 250 FOR IP)

## 2021-12-27 PROCEDURE — 93005 ELECTROCARDIOGRAM TRACING: CPT | Performed by: STUDENT IN AN ORGANIZED HEALTH CARE EDUCATION/TRAINING PROGRAM

## 2021-12-27 PROCEDURE — 85025 COMPLETE CBC W/AUTO DIFF WBC: CPT

## 2021-12-27 PROCEDURE — 0202U NFCT DS 22 TRGT SARS-COV-2: CPT

## 2021-12-27 RX ORDER — PANTOPRAZOLE SODIUM 40 MG/1
40 TABLET, DELAYED RELEASE ORAL
Status: DISCONTINUED | OUTPATIENT
Start: 2021-12-27 | End: 2021-12-29 | Stop reason: HOSPADM

## 2021-12-27 RX ORDER — HYDROXYZINE HYDROCHLORIDE 25 MG/1
25 TABLET, FILM COATED ORAL ONCE
Status: COMPLETED | OUTPATIENT
Start: 2021-12-27 | End: 2021-12-27

## 2021-12-27 RX ORDER — BENZONATATE 100 MG/1
100 CAPSULE ORAL 3 TIMES DAILY PRN
Status: DISCONTINUED | OUTPATIENT
Start: 2021-12-27 | End: 2021-12-29 | Stop reason: HOSPADM

## 2021-12-27 RX ORDER — HYDROXYZINE HYDROCHLORIDE 50 MG/ML
50 INJECTION, SOLUTION INTRAMUSCULAR ONCE
Status: DISCONTINUED | OUTPATIENT
Start: 2021-12-27 | End: 2021-12-27

## 2021-12-27 RX ORDER — GUAIFENESIN DEXTROMETHORPHAN HYDROBROMIDE ORAL SOLUTION 10; 100 MG/5ML; MG/5ML
10 SOLUTION ORAL EVERY 4 HOURS PRN
Status: DISCONTINUED | OUTPATIENT
Start: 2021-12-27 | End: 2021-12-29 | Stop reason: HOSPADM

## 2021-12-27 RX ORDER — POTASSIUM CHLORIDE 20 MEQ/1
40 TABLET, EXTENDED RELEASE ORAL ONCE
Status: COMPLETED | OUTPATIENT
Start: 2021-12-27 | End: 2021-12-27

## 2021-12-27 RX ADMIN — ACETAMINOPHEN 650 MG: 325 TABLET ORAL at 21:34

## 2021-12-27 RX ADMIN — PANTOPRAZOLE SODIUM 40 MG: 40 TABLET, DELAYED RELEASE ORAL at 09:02

## 2021-12-27 RX ADMIN — AMPICILLIN SODIUM AND SULBACTAM SODIUM 1500 MG: 1; .5 INJECTION, POWDER, FOR SOLUTION INTRAMUSCULAR; INTRAVENOUS at 12:36

## 2021-12-27 RX ADMIN — HEPARIN SODIUM 5000 UNITS: 5000 INJECTION INTRAVENOUS; SUBCUTANEOUS at 23:34

## 2021-12-27 RX ADMIN — SODIUM CHLORIDE, PRESERVATIVE FREE 10 ML: 5 INJECTION INTRAVENOUS at 23:34

## 2021-12-27 RX ADMIN — HEPARIN SODIUM 5000 UNITS: 5000 INJECTION INTRAVENOUS; SUBCUTANEOUS at 06:16

## 2021-12-27 RX ADMIN — HYDROXYZINE HYDROCHLORIDE 25 MG: 25 TABLET ORAL at 23:34

## 2021-12-27 RX ADMIN — AMPICILLIN SODIUM AND SULBACTAM SODIUM 1500 MG: 1; .5 INJECTION, POWDER, FOR SOLUTION INTRAMUSCULAR; INTRAVENOUS at 04:54

## 2021-12-27 RX ADMIN — AMPICILLIN SODIUM AND SULBACTAM SODIUM 1500 MG: 1; .5 INJECTION, POWDER, FOR SOLUTION INTRAMUSCULAR; INTRAVENOUS at 17:47

## 2021-12-27 RX ADMIN — POTASSIUM CHLORIDE 40 MEQ: 1500 TABLET, EXTENDED RELEASE ORAL at 09:02

## 2021-12-27 RX ADMIN — SODIUM CHLORIDE, PRESERVATIVE FREE 10 ML: 5 INJECTION INTRAVENOUS at 09:02

## 2021-12-27 RX ADMIN — AMPICILLIN SODIUM AND SULBACTAM SODIUM 1500 MG: 1; .5 INJECTION, POWDER, FOR SOLUTION INTRAMUSCULAR; INTRAVENOUS at 23:34

## 2021-12-27 RX ADMIN — PANTOPRAZOLE SODIUM 40 MG: 40 TABLET, DELAYED RELEASE ORAL at 16:40

## 2021-12-27 ASSESSMENT — PAIN SCALES - GENERAL
PAINLEVEL_OUTOF10: 6
PAINLEVEL_OUTOF10: 3
PAINLEVEL_OUTOF10: 7

## 2021-12-27 ASSESSMENT — PAIN DESCRIPTION - LOCATION
LOCATION: BACK
LOCATION: BACK

## 2021-12-27 ASSESSMENT — PAIN DESCRIPTION - PAIN TYPE
TYPE: CHRONIC PAIN
TYPE: CHRONIC PAIN

## 2021-12-27 NOTE — PLAN OF CARE
Problem: OXYGENATION/RESPIRATORY FUNCTION  Goal: Patient will maintain patent airway  12/27/2021 1801 by Colonel Patrick RN  Outcome: Ongoing  12/27/2021 0631 by Anna Summers RN  Outcome: Ongoing  Goal: Patient will achieve/maintain normal respiratory rate/effort  Description: Respiratory rate and effort will be within normal limits for the patient  12/27/2021 1801 by Colonel Patrick RN  Outcome: Ongoing  12/27/2021 0631 by Anna Summers RN  Outcome: Ongoing     Problem: SKIN INTEGRITY  Goal: Skin integrity is maintained or improved  12/27/2021 1801 by Colonel Patrick RN  Outcome: Ongoing  12/27/2021 0631 by Anna Summers RN  Outcome: Ongoing     Problem: Nutrition  Goal: Optimal nutrition therapy  12/27/2021 1801 by Colonel Patrick RN  Outcome: Ongoing  12/27/2021 0631 by Anna Summers RN  Outcome: Ongoing     Problem: Skin Integrity:  Goal: Will show no infection signs and symptoms  Description: Will show no infection signs and symptoms  12/27/2021 1801 by Colonel Patrick RN  Outcome: Ongoing  12/27/2021 0631 by Anna Summers RN  Outcome: Ongoing  Goal: Absence of new skin breakdown  Description: Absence of new skin breakdown  12/27/2021 1801 by Colonel Patrick RN  Outcome: Ongoing  12/27/2021 0631 by Anna Summers RN  Outcome: Ongoing     Problem: Falls - Risk of:  Goal: Will remain free from falls  Description: Will remain free from falls  12/27/2021 1801 by Colonel Patrick RN  Outcome: Ongoing  12/27/2021 0631 by Anna Summers RN  Outcome: Ongoing  Goal: Absence of physical injury  Description: Absence of physical injury  12/27/2021 1801 by Colonel Patrick RN  Outcome: Ongoing  12/27/2021 0631 by Anna Summers RN  Outcome: Ongoing     Problem: Pain:  Goal: Pain level will decrease  Description: Pain level will decrease  12/27/2021 1801 by Colonel Patrick RN  Outcome: Ongoing  12/27/2021 0631 by nAna Summers RN  Outcome: Ongoing  Goal: Control of acute pain  Description: Control of acute pain  12/27/2021 1801 by Colonel Nguyen RN  Outcome: Ongoing  12/27/2021 0631 by Diandra Joshi RN  Outcome: Ongoing  Goal: Control of chronic pain  Description: Control of chronic pain  12/27/2021 1801 by Caty Olivera RN  Outcome: Ongoing  12/27/2021 0631 by Diandra Joshi RN  Outcome: Ongoing     Problem: Confusion - Acute:  Goal: Absence of continued neurological deterioration signs and symptoms  Description: Absence of continued neurological deterioration signs and symptoms  12/27/2021 1801 by Caty Olivera RN  Outcome: Ongoing  12/27/2021 0631 by Diandra Joshi RN  Outcome: Ongoing  Goal: Mental status will be restored to baseline  Description: Mental status will be restored to baseline  12/27/2021 1801 by Caty Olivera RN  Outcome: Ongoing  12/27/2021 0631 by Diandra Joshi RN  Outcome: Ongoing     Problem: Discharge Planning:  Goal: Ability to perform activities of daily living will improve  Description: Ability to perform activities of daily living will improve  12/27/2021 1801 by Caty Olivera RN  Outcome: Ongoing  12/27/2021 0631 by Diandra Joshi RN  Outcome: Ongoing  Goal: Participates in care planning  Description: Participates in care planning  12/27/2021 1801 by Caty Olivera RN  Outcome: Ongoing  12/27/2021 0631 by Dianrda Joshi RN  Outcome: Ongoing     Problem: Injury - Risk of, Physical Injury:  Goal: Will remain free from falls  Description: Will remain free from falls  12/27/2021 1801 by Caty Olivera RN  Outcome: Ongoing  12/27/2021 0631 by Diandra Joshi RN  Outcome: Ongoing  Goal: Absence of physical injury  Description: Absence of physical injury  12/27/2021 1801 by Caty Olivera RN  Outcome: Ongoing  12/27/2021 0631 by Diandra Joshi RN  Outcome: Ongoing     Problem: Mood - Altered:  Goal: Mood stable  Description: Mood stable  12/27/2021 1801 by Caty Olivera RN  Outcome: Ongoing  12/27/2021 0631 by Diandra Joshi RN  Outcome: Ongoing  Goal: Absence of abusive behavior  Description: Absence of abusive behavior  12/27/2021 1801 by Rashmi Rios Ora Boo RN  Outcome: Ongoing  12/27/2021 0631 by Veronica Andersen RN  Outcome: Ongoing  Goal: Verbalizations of feeling emotionally comfortable while being cared for will increase  Description: Verbalizations of feeling emotionally comfortable while being cared for will increase  12/27/2021 1801 by Ailyn Mclaughlin RN  Outcome: Ongoing  12/27/2021 0631 by Veronica Andersen RN  Outcome: Ongoing     Problem: Psychomotor Activity - Altered:  Goal: Absence of psychomotor disturbance signs and symptoms  Description: Absence of psychomotor disturbance signs and symptoms  12/27/2021 1801 by Ailyn Mclaughlin RN  Outcome: Ongoing  12/27/2021 0631 by Veronica Andersen RN  Outcome: Ongoing     Problem: Sensory Perception - Impaired:  Goal: Demonstrations of improved sensory functioning will increase  Description: Demonstrations of improved sensory functioning will increase  12/27/2021 1801 by Ailyn Mclaughlin RN  Outcome: Ongoing  12/27/2021 0631 by Veronica Andersen RN  Outcome: Ongoing  Goal: Decrease in sensory misperception frequency  Description: Decrease in sensory misperception frequency  12/27/2021 1801 by Ailyn Mclaughlin RN  Outcome: Ongoing  12/27/2021 0631 by Veronica Andersen RN  Outcome: Ongoing  Goal: Able to refrain from responding to false sensory perceptions  Description: Able to refrain from responding to false sensory perceptions  12/27/2021 1801 by Ailyn Mclaughlin RN  Outcome: Ongoing  12/27/2021 0631 by Veronica Andersen RN  Outcome: Ongoing  Goal: Demonstrates accurate environmental perceptions  Description: Demonstrates accurate environmental perceptions  12/27/2021 1801 by Ailyn Mclaughlin RN  Outcome: Ongoing  12/27/2021 0631 by Veronica Andersen RN  Outcome: Ongoing  Goal: Able to distinguish between reality-based and nonreality-based thinking  Description: Able to distinguish between reality-based and nonreality-based thinking  12/27/2021 1801 by Ailyn Mclaughlin RN  Outcome: Ongoing  12/27/2021 0631 by Veronica Andersen RN  Outcome: Ongoing  Goal: Able to interrupt nonreality-based thinking  Description: Able to interrupt nonreality-based thinking  12/27/2021 1801 by Bryan Wray RN  Outcome: Ongoing  12/27/2021 0631 by Rosie Cavazos RN  Outcome: Ongoing     Problem: Sleep Pattern Disturbance:  Goal: Appears well-rested  Description: Appears well-rested  12/27/2021 1801 by Bryan Wray RN  Outcome: Ongoing  12/27/2021 0631 by Rosie Cavazos RN  Outcome: Ongoing
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
## 2021-12-27 NOTE — PLAN OF CARE
Control of chronic pain  Outcome: Ongoing     Problem: Confusion - Acute:  Goal: Absence of continued neurological deterioration signs and symptoms  Description: Absence of continued neurological deterioration signs and symptoms  Outcome: Ongoing  Goal: Mental status will be restored to baseline  Description: Mental status will be restored to baseline  Outcome: Ongoing     Problem: Discharge Planning:  Goal: Ability to perform activities of daily living will improve  Description: Ability to perform activities of daily living will improve  Outcome: Ongoing  Goal: Participates in care planning  Description: Participates in care planning  Outcome: Ongoing     Problem: Injury - Risk of, Physical Injury:  Goal: Will remain free from falls  Description: Will remain free from falls  12/27/2021 0631 by Maia Brown RN  Outcome: Ongoing  12/26/2021 1954 by Roselyn Pop RN  Outcome: Met This Shift  Goal: Absence of physical injury  Description: Absence of physical injury  12/27/2021 0631 by Maia Brown RN  Outcome: Ongoing  12/26/2021 1954 by Roselyn Pop RN  Outcome: Met This Shift     Problem: Mood - Altered:  Goal: Mood stable  Description: Mood stable  Outcome: Ongoing  Goal: Absence of abusive behavior  Description: Absence of abusive behavior  Outcome: Ongoing  Goal: Verbalizations of feeling emotionally comfortable while being cared for will increase  Description: Verbalizations of feeling emotionally comfortable while being cared for will increase  Outcome: Ongoing     Problem: Psychomotor Activity - Altered:  Goal: Absence of psychomotor disturbance signs and symptoms  Description: Absence of psychomotor disturbance signs and symptoms  Outcome: Ongoing     Problem: Sensory Perception - Impaired:  Goal: Demonstrations of improved sensory functioning will increase  Description: Demonstrations of improved sensory functioning will increase  Outcome: Ongoing  Goal: Decrease in sensory misperception frequency  Description: Decrease in sensory misperception frequency  Outcome: Ongoing  Goal: Able to refrain from responding to false sensory perceptions  Description: Able to refrain from responding to false sensory perceptions  Outcome: Ongoing  Goal: Demonstrates accurate environmental perceptions  Description: Demonstrates accurate environmental perceptions  Outcome: Ongoing  Goal: Able to distinguish between reality-based and nonreality-based thinking  Description: Able to distinguish between reality-based and nonreality-based thinking  Outcome: Ongoing  Goal: Able to interrupt nonreality-based thinking  Description: Able to interrupt nonreality-based thinking  Outcome: Ongoing     Problem: Sleep Pattern Disturbance:  Goal: Appears well-rested  Description: Appears well-rested  Outcome: Ongoing

## 2021-12-27 NOTE — PLAN OF CARE
Problem: Non-Violent Restraints  Goal: Removal from restraints as soon as assessed to be safe  12/26/2021 1953 by Susie Chiu RN  Outcome: Completed  12/26/2021 0703 by Kiley Reyes RN  Outcome: Not Met This Shift  Goal: No harm/injury to patient while restraints in use  12/26/2021 1953 by Susie Chiu RN  Outcome: Completed  12/26/2021 0703 by Kiley Reyes RN  Outcome: Met This Shift  Goal: Patient's dignity will be maintained  12/26/2021 1953 by Susie Chiu RN  Outcome: Completed  12/26/2021 0703 by Kiley Reyes RN  Outcome: Met This Shift     Problem: MECHANICAL VENTILATION  Goal: Patient will maintain patent airway  12/26/2021 1953 by Susie Chiu RN  Outcome: Completed  12/26/2021 0948 by Smiley Abernathy  Outcome: Ongoing  12/26/2021 0703 by Kiley Reyes RN  Outcome: Ongoing  Goal: Oral health is maintained or improved  12/26/2021 1953 by Susie Chiu RN  Outcome: Completed  12/26/2021 0948 by Smiley Abernathy  Outcome: Ongoing  12/26/2021 0703 by Kiley Reyes RN  Outcome: Ongoing  Goal: ET tube will be managed safely  12/26/2021 1953 by Susie Chiu RN  Outcome: Completed  12/26/2021 0948 by Smiley Abernathy  Outcome: Ongoing  12/26/2021 0703 by Kiley Reyes RN  Outcome: Ongoing  Goal: Ability to express needs and understand communication  12/26/2021 1953 by Susie Chiu RN  Outcome: Completed  12/26/2021 0948 by Smiley Abernathy  Outcome: Ongoing  12/26/2021 0703 by Kiley Reyes RN  Outcome: Ongoing
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
## 2021-12-27 NOTE — PLAN OF CARE
Problem: OXYGENATION/RESPIRATORY FUNCTION  Goal: Patient will maintain patent airway  12/26/2021 0948 by Guerrero Uribe  Outcome: Ongoing  12/26/2021 0703 by Rolo Redmond RN  Outcome: Ongoing  Goal: Patient will achieve/maintain normal respiratory rate/effort  Description: Respiratory rate and effort will be within normal limits for the patient  12/26/2021 0948 by Guerrero Uribe  Outcome: Ongoing  12/26/2021 0703 by Rolo Redmond RN  Outcome: Ongoing     Problem: SKIN INTEGRITY  Goal: Skin integrity is maintained or improved  12/26/2021 0948 by Guerrero Uribe  Outcome: Ongoing  12/26/2021 0703 by Rolo Redmond RN  Outcome: Ongoing     Problem: Nutrition  Goal: Optimal nutrition therapy  12/26/2021 0703 by Rolo Redmond RN  Outcome: Ongoing     Problem: Skin Integrity:  Goal: Will show no infection signs and symptoms  Description: Will show no infection signs and symptoms  12/26/2021 0703 by Rolo Redmond RN  Outcome: Ongoing  Goal: Absence of new skin breakdown  Description: Absence of new skin breakdown  12/26/2021 0703 by Rolo Redmond RN  Outcome: Ongoing     Problem: Falls - Risk of:  Goal: Will remain free from falls  Description: Will remain free from falls  12/26/2021 0703 by Rolo Redmond RN  Outcome: Ongoing  Goal: Absence of physical injury  Description: Absence of physical injury  12/26/2021 0703 by Rolo Redmond RN  Outcome: Ongoing     Problem: Non-Violent Restraints  Goal: Removal from restraints as soon as assessed to be safe  12/26/2021 1953 by Devaughn Bass RN  Outcome: Completed  12/26/2021 0703 by Rolo Redmond RN  Outcome: Not Met This Shift  Goal: No harm/injury to patient while restraints in use  12/26/2021 1953 by Devaughn Bass RN  Outcome: Completed  12/26/2021 0703 by Rolo Redmond RN  Outcome: Met This Shift  Goal: Patient's dignity will be maintained  12/26/2021 1953 by Devaughn Bass RN  Outcome: Completed  12/26/2021 0703 by Rolo Redmond RN  Outcome: Met This Shift     Problem: MECHANICAL VENTILATION  Goal: Patient will maintain patent airway  12/26/2021 1953 by Bina Mauricio RN  Outcome: Completed  12/26/2021 0948 by Pilar Mcfarland  Outcome: Ongoing  12/26/2021 0703 by Viridiana Reinoso RN  Outcome: Ongoing  Goal: Oral health is maintained or improved  12/26/2021 1953 by Bina Mauricio RN  Outcome: Completed  12/26/2021 0948 by Pilar Mcfarland  Outcome: Ongoing  12/26/2021 0703 by Viridiana Reinoso RN  Outcome: Ongoing  Goal: ET tube will be managed safely  12/26/2021 1953 by Bina Mauricio RN  Outcome: Completed  12/26/2021 0948 by Pilar Mcfarland  Outcome: Ongoing  12/26/2021 0703 by Viridiana Reinoso RN  Outcome: Ongoing  Goal: Ability to express needs and understand communication  12/26/2021 1953 by Bina Mauricio RN  Outcome: Completed  12/26/2021 0948 by Pilar Mcfarland  Outcome: Ongoing  12/26/2021 0703 by Viridiana Reinoso RN  Outcome: Ongoing

## 2021-12-28 PROBLEM — E87.6 HYPOKALEMIA: Status: ACTIVE | Noted: 2021-12-28

## 2021-12-28 LAB
ABSOLUTE EOS #: 0.53 K/UL (ref 0–0.44)
ABSOLUTE IMMATURE GRANULOCYTE: 0.05 K/UL (ref 0–0.3)
ABSOLUTE LYMPH #: 1.64 K/UL (ref 1.1–3.7)
ABSOLUTE MONO #: 0.79 K/UL (ref 0.1–1.2)
ADENOVIRUS PCR: NOT DETECTED
ANION GAP SERPL CALCULATED.3IONS-SCNC: 12 MMOL/L (ref 9–17)
BASOPHILS # BLD: 0 % (ref 0–2)
BASOPHILS ABSOLUTE: 0.03 K/UL (ref 0–0.2)
BORDETELLA PARAPERTUSSIS: NOT DETECTED
BORDETELLA PERTUSSIS PCR: NOT DETECTED
BUN BLDV-MCNC: 8 MG/DL (ref 6–20)
BUN/CREAT BLD: ABNORMAL (ref 9–20)
CALCIUM SERPL-MCNC: 8.4 MG/DL (ref 8.6–10.4)
CHLAMYDIA PNEUMONIAE BY PCR: NOT DETECTED
CHLORIDE BLD-SCNC: 105 MMOL/L (ref 98–107)
CO2: 21 MMOL/L (ref 20–31)
CORONAVIRUS 229E PCR: NOT DETECTED
CORONAVIRUS HKU1 PCR: NOT DETECTED
CORONAVIRUS NL63 PCR: NOT DETECTED
CORONAVIRUS OC43 PCR: NOT DETECTED
CREAT SERPL-MCNC: 0.71 MG/DL (ref 0.5–0.9)
DIFFERENTIAL TYPE: ABNORMAL
EKG ATRIAL RATE: 82 BPM
EKG P AXIS: 73 DEGREES
EKG P-R INTERVAL: 160 MS
EKG Q-T INTERVAL: 506 MS
EKG QRS DURATION: 84 MS
EKG QTC CALCULATION (BAZETT): 591 MS
EKG R AXIS: 73 DEGREES
EKG T AXIS: 64 DEGREES
EKG VENTRICULAR RATE: 82 BPM
EOSINOPHILS RELATIVE PERCENT: 7 % (ref 1–4)
GFR AFRICAN AMERICAN: >60 ML/MIN
GFR NON-AFRICAN AMERICAN: >60 ML/MIN
GFR SERPL CREATININE-BSD FRML MDRD: ABNORMAL ML/MIN/{1.73_M2}
GFR SERPL CREATININE-BSD FRML MDRD: ABNORMAL ML/MIN/{1.73_M2}
GLUCOSE BLD-MCNC: 118 MG/DL (ref 65–105)
GLUCOSE BLD-MCNC: 119 MG/DL (ref 70–99)
GLUCOSE BLD-MCNC: 88 MG/DL (ref 65–105)
GLUCOSE BLD-MCNC: 89 MG/DL (ref 65–105)
GLUCOSE BLD-MCNC: 95 MG/DL (ref 65–105)
HCT VFR BLD CALC: 37.7 % (ref 36.3–47.1)
HEMOGLOBIN: 12.8 G/DL (ref 11.9–15.1)
HUMAN METAPNEUMOVIRUS PCR: NOT DETECTED
IMMATURE GRANULOCYTES: 1 %
INFLUENZA A BY PCR: NOT DETECTED
INFLUENZA A H1 (2009) PCR: NORMAL
INFLUENZA A H1 PCR: NORMAL
INFLUENZA A H3 PCR: NORMAL
INFLUENZA B BY PCR: NOT DETECTED
LYMPHOCYTES # BLD: 20 % (ref 24–43)
MAGNESIUM: 1.9 MG/DL (ref 1.6–2.6)
MCH RBC QN AUTO: 30.2 PG (ref 25.2–33.5)
MCHC RBC AUTO-ENTMCNC: 34 G/DL (ref 28.4–34.8)
MCV RBC AUTO: 88.9 FL (ref 82.6–102.9)
MONOCYTES # BLD: 10 % (ref 3–12)
MYCOPLASMA PNEUMONIAE PCR: NOT DETECTED
NRBC AUTOMATED: 0 PER 100 WBC
PARAINFLUENZA 1 PCR: NOT DETECTED
PARAINFLUENZA 2 PCR: NOT DETECTED
PARAINFLUENZA 3 PCR: NOT DETECTED
PARAINFLUENZA 4 PCR: NOT DETECTED
PDW BLD-RTO: 12.7 % (ref 11.8–14.4)
PLATELET # BLD: 296 K/UL (ref 138–453)
PLATELET ESTIMATE: ABNORMAL
PMV BLD AUTO: 9.9 FL (ref 8.1–13.5)
POTASSIUM SERPL-SCNC: 3.5 MMOL/L (ref 3.7–5.3)
RBC # BLD: 4.24 M/UL (ref 3.95–5.11)
RBC # BLD: ABNORMAL 10*6/UL
RESP SYNCYTIAL VIRUS PCR: NOT DETECTED
RHINO/ENTEROVIRUS PCR: NOT DETECTED
SARS-COV-2, PCR: NOT DETECTED
SEG NEUTROPHILS: 62 % (ref 36–65)
SEGMENTED NEUTROPHILS ABSOLUTE COUNT: 5.05 K/UL (ref 1.5–8.1)
SODIUM BLD-SCNC: 138 MMOL/L (ref 135–144)
SPECIMEN DESCRIPTION: NORMAL
WBC # BLD: 8.1 K/UL (ref 3.5–11.3)
WBC # BLD: ABNORMAL 10*3/UL

## 2021-12-28 PROCEDURE — 2580000003 HC RX 258: Performed by: STUDENT IN AN ORGANIZED HEALTH CARE EDUCATION/TRAINING PROGRAM

## 2021-12-28 PROCEDURE — 97530 THERAPEUTIC ACTIVITIES: CPT

## 2021-12-28 PROCEDURE — 6360000002 HC RX W HCPCS: Performed by: STUDENT IN AN ORGANIZED HEALTH CARE EDUCATION/TRAINING PROGRAM

## 2021-12-28 PROCEDURE — 6370000000 HC RX 637 (ALT 250 FOR IP): Performed by: STUDENT IN AN ORGANIZED HEALTH CARE EDUCATION/TRAINING PROGRAM

## 2021-12-28 PROCEDURE — 93005 ELECTROCARDIOGRAM TRACING: CPT | Performed by: STUDENT IN AN ORGANIZED HEALTH CARE EDUCATION/TRAINING PROGRAM

## 2021-12-28 PROCEDURE — 1200000000 HC SEMI PRIVATE

## 2021-12-28 PROCEDURE — 36415 COLL VENOUS BLD VENIPUNCTURE: CPT

## 2021-12-28 PROCEDURE — 97162 PT EVAL MOD COMPLEX 30 MIN: CPT

## 2021-12-28 PROCEDURE — 90792 PSYCH DIAG EVAL W/MED SRVCS: CPT | Performed by: PSYCHIATRY & NEUROLOGY

## 2021-12-28 PROCEDURE — 83735 ASSAY OF MAGNESIUM: CPT

## 2021-12-28 PROCEDURE — 97535 SELF CARE MNGMENT TRAINING: CPT

## 2021-12-28 PROCEDURE — 80048 BASIC METABOLIC PNL TOTAL CA: CPT

## 2021-12-28 PROCEDURE — 97166 OT EVAL MOD COMPLEX 45 MIN: CPT

## 2021-12-28 PROCEDURE — 82947 ASSAY GLUCOSE BLOOD QUANT: CPT

## 2021-12-28 PROCEDURE — 99232 SBSQ HOSP IP/OBS MODERATE 35: CPT | Performed by: INTERNAL MEDICINE

## 2021-12-28 PROCEDURE — 6370000000 HC RX 637 (ALT 250 FOR IP)

## 2021-12-28 PROCEDURE — 85025 COMPLETE CBC W/AUTO DIFF WBC: CPT

## 2021-12-28 PROCEDURE — 94761 N-INVAS EAR/PLS OXIMETRY MLT: CPT

## 2021-12-28 RX ORDER — ROPINIROLE 2 MG/1
2 TABLET, FILM COATED ORAL 2 TIMES DAILY
Status: ON HOLD | COMMUNITY
End: 2021-12-29 | Stop reason: HOSPADM

## 2021-12-28 RX ORDER — MAGNESIUM SULFATE IN WATER 40 MG/ML
2000 INJECTION, SOLUTION INTRAVENOUS ONCE
Status: COMPLETED | OUTPATIENT
Start: 2021-12-28 | End: 2021-12-28

## 2021-12-28 RX ORDER — POTASSIUM BICARBONATE 25 MEQ/1
50 TABLET, EFFERVESCENT ORAL ONCE
Status: DISCONTINUED | OUTPATIENT
Start: 2021-12-28 | End: 2021-12-28 | Stop reason: CLARIF

## 2021-12-28 RX ORDER — CETIRIZINE HYDROCHLORIDE 10 MG/1
10 TABLET ORAL ONCE
Status: COMPLETED | OUTPATIENT
Start: 2021-12-28 | End: 2021-12-28

## 2021-12-28 RX ORDER — BUSPIRONE HYDROCHLORIDE 10 MG/1
10 TABLET ORAL 2 TIMES DAILY
Status: ON HOLD | COMMUNITY
End: 2021-12-28

## 2021-12-28 RX ORDER — SENNA AND DOCUSATE SODIUM 50; 8.6 MG/1; MG/1
2 TABLET, FILM COATED ORAL DAILY PRN
Status: DISCONTINUED | OUTPATIENT
Start: 2021-12-28 | End: 2021-12-29 | Stop reason: HOSPADM

## 2021-12-28 RX ORDER — QUETIAPINE FUMARATE 100 MG/1
100 TABLET, FILM COATED ORAL 2 TIMES DAILY
Status: ON HOLD | COMMUNITY
End: 2021-12-29 | Stop reason: HOSPADM

## 2021-12-28 RX ORDER — PRAZOSIN HYDROCHLORIDE 2 MG/1
4 CAPSULE ORAL NIGHTLY
Status: ON HOLD | COMMUNITY
End: 2021-12-29 | Stop reason: HOSPADM

## 2021-12-28 RX ORDER — DOXEPIN HYDROCHLORIDE 10 MG/1
10 CAPSULE ORAL NIGHTLY
Status: ON HOLD | COMMUNITY
End: 2021-12-29 | Stop reason: HOSPADM

## 2021-12-28 RX ORDER — QUETIAPINE FUMARATE 300 MG/1
600 TABLET, FILM COATED ORAL NIGHTLY
Status: ON HOLD | COMMUNITY
End: 2021-12-29 | Stop reason: HOSPADM

## 2021-12-28 RX ORDER — BUSPIRONE HYDROCHLORIDE 15 MG/1
15 TABLET ORAL 2 TIMES DAILY
Status: ON HOLD | COMMUNITY
End: 2021-12-29 | Stop reason: HOSPADM

## 2021-12-28 RX ORDER — OXCARBAZEPINE 300 MG/1
300 TABLET, FILM COATED ORAL 2 TIMES DAILY
Status: ON HOLD | COMMUNITY
End: 2021-12-29 | Stop reason: HOSPADM

## 2021-12-28 RX ADMIN — PANTOPRAZOLE SODIUM 40 MG: 40 TABLET, DELAYED RELEASE ORAL at 16:23

## 2021-12-28 RX ADMIN — SODIUM CHLORIDE, PRESERVATIVE FREE 10 ML: 5 INJECTION INTRAVENOUS at 09:21

## 2021-12-28 RX ADMIN — ACETAMINOPHEN 650 MG: 325 TABLET ORAL at 08:07

## 2021-12-28 RX ADMIN — BENZONATATE 100 MG: 100 CAPSULE ORAL at 08:07

## 2021-12-28 RX ADMIN — CETIRIZINE HYDROCHLORIDE 10 MG: 10 TABLET ORAL at 20:49

## 2021-12-28 RX ADMIN — HEPARIN SODIUM 5000 UNITS: 5000 INJECTION INTRAVENOUS; SUBCUTANEOUS at 20:49

## 2021-12-28 RX ADMIN — POTASSIUM BICARBONATE 40 MEQ: 782 TABLET, EFFERVESCENT ORAL at 09:15

## 2021-12-28 RX ADMIN — ACETAMINOPHEN 650 MG: 325 TABLET ORAL at 16:28

## 2021-12-28 RX ADMIN — PANTOPRAZOLE SODIUM 40 MG: 40 TABLET, DELAYED RELEASE ORAL at 05:55

## 2021-12-28 RX ADMIN — HEPARIN SODIUM 5000 UNITS: 5000 INJECTION INTRAVENOUS; SUBCUTANEOUS at 05:55

## 2021-12-28 RX ADMIN — BENZONATATE 100 MG: 100 CAPSULE ORAL at 18:00

## 2021-12-28 RX ADMIN — HEPARIN SODIUM 5000 UNITS: 5000 INJECTION INTRAVENOUS; SUBCUTANEOUS at 13:54

## 2021-12-28 RX ADMIN — AMPICILLIN SODIUM AND SULBACTAM SODIUM 1500 MG: 1; .5 INJECTION, POWDER, FOR SOLUTION INTRAMUSCULAR; INTRAVENOUS at 05:56

## 2021-12-28 RX ADMIN — MAGNESIUM SULFATE 2000 MG: 2 INJECTION INTRAVENOUS at 09:15

## 2021-12-28 RX ADMIN — SODIUM CHLORIDE, PRESERVATIVE FREE 10 ML: 5 INJECTION INTRAVENOUS at 20:49

## 2021-12-28 ASSESSMENT — PAIN SCALES - GENERAL
PAINLEVEL_OUTOF10: 9
PAINLEVEL_OUTOF10: 4
PAINLEVEL_OUTOF10: 3
PAINLEVEL_OUTOF10: 0
PAINLEVEL_OUTOF10: 4

## 2021-12-28 NOTE — PROGRESS NOTES
Comprehensive Nutrition Assessment    Type and Reason for Visit:  Reassess    Nutrition Recommendations/Plan: Continue current diet. Encourage/monitor PO intakes. Will monitor labs, weights, and plan of care. Nutrition Assessment:  Pt extubated on 12/26. Pt has been started on an oral diet and reports having a good appetite. Consuming at least 50% of meals and drinking fluids well per pt. Reports having a BM yesterday. Labs/Meds reviewed. Malnutrition Assessment:  Malnutrition Status: At risk for malnutrition    Context:  Acute Illness     Findings of the 6 clinical characteristics of malnutrition:  Energy Intake:  Mild decrease in energy intake  Weight Loss:  No significant weight loss     Body Fat Loss:  No significant body fat loss   Muscle Mass Loss:  No significant muscle mass loss  Fluid Accumulation:  1 - Mild Generalized   Strength:  Not Performed    Estimated Daily Nutrient Needs:  Energy (kcal):  22-25 kcal/kg = 9371-0360 kcals/day; Weight Used for Energy Requirements:  Current     Protein (g):  70-80 gm/day; Weight Used for Protein Requirements:  Ideal (1.2-1.3)        Fluid (ml/day):  4780-0338 mL/day or per MD; Method Used for Fluid Requirements:  ml/Kg (30-35)      Nutrition Related Findings:  Labs: K 3.5 mmol/L. Meds reviewed. Pt reports BM 12/27. Wounds:  None       Current Nutrition Therapies:    ADULT DIET; Regular    Anthropometric Measures:  · Height: 5' 6\" (167.6 cm)  · Current Body Weight: 161 lb (73 kg)   · Admission Body Weight: 166 lb 7.2 oz (75.5 kg)    · Ideal Body Weight: 130 lbs; % Ideal Body Weight 123.8 %   · BMI: 26  · BMI Categories: Overweight (BMI 25.0-29. 9)       Nutrition Diagnosis:   · Inadequate oral intake related to  (recent extubation; current medical condition) as evidenced by intake 51-75% (recent start of oral diet; improving PO intakes)    Nutrition Interventions:   Food and/or Nutrient Delivery:  Continue Current Diet (Monitor need for oral supplements with meals.)  Nutrition Education/Counseling:  No recommendation at this time   Coordination of Nutrition Care:  Continue to monitor while inpatient    Goals:  Meet greater than 50% of estimated nutrient needs       Nutrition Monitoring and Evaluation:   Behavioral-Environmental Outcomes:  None Identified   Food/Nutrient Intake Outcomes:  Food and Nutrient Intake  Physical Signs/Symptoms Outcomes:  Biochemical Data,GI Status,Nutrition Focused Physical Findings,Skin,Weight     Discharge Planning:    Continue current diet     Electronically signed by Sherine Moreira RD, LD on 12/28/21 at 1:03 PM EST    Contact: [phone]
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
## 2021-12-28 NOTE — PLAN OF CARE
Nutrition Problem #1: Inadequate oral intake  Intervention: Food and/or Nutrient Delivery: Continue Current Diet (Monitor need for oral supplements with meals.)  Nutritional Goals: Meet greater than 50% of estimated nutrient needs

## 2021-12-28 NOTE — PROGRESS NOTES
Occupational Therapy   Occupational Therapy Initial Assessment  Date: 2021   Patient Name: Zackary Waldron  MRN: [id number]     : 1985    Date of Service: 2021    Chief Complaint   Patient presents with    Drug Overdose     Discharge Recommendations:  Patient would benefit from continued therapy after discharge       Assessment   Performance deficits / Impairments: Decreased functional mobility ; Decreased ADL status; Decreased endurance;Decreased high-level IADLs;Decreased safe awareness;Decreased cognition;Decreased balance  Prognosis: Good  Decision Making: Medium Complexity  OT Education: Plan of Care;OT Role;Transfer Training;Equipment;Precautions (Activity Promotion, Safety Awareness/Fall Prevention)  REQUIRES OT FOLLOW UP: Yes  Activity Tolerance  Activity Tolerance: Patient Tolerated treatment well  Safety Devices  Safety Devices in place: Yes  Type of devices: Nurse notified;Call light within reach; Left in bed;Bed alarm in place (sitter in place)  Restraints  Initially in place: No         Patient Diagnosis(es): The encounter diagnosis was Intentional drug overdose, initial encounter ([facility] [address] has a past medical history of ADHD, Allergy, Aspiration pneumonia ([address] Bipolar 1 disorder ([address] Bronchitis, chronic ([address] Cocaine abuse with intoxication with complication ([address] Osteoarthritis, Osteoarthritis, Rh negative state in antepartum period, Schizophrenia ([address] Seizures ([address] and Spinal stenosis. has a past surgical history that includes Cholecystectomy; hernia repair; Upper gastrointestinal endoscopy; Mandible fracture surgery; Dilation and curettage of uterus; hernia repair (); Dilation & curettage; Mandible surgery; hernia repair; and Dilation and curettage of uterus.          Restrictions  Restrictions/Precautions  Restrictions/Precautions: Fall Risk  Required Braces or Orthoses?: No    Subjective   General  Patient assessed for rehabilitation services?: Yes  Family / Caregiver Present: No  General Comment  Comments: RN ok'd for therapy this AM. Pt agreeable to participate in session and pleasant/cooperative throughout. Patient Currently in Pain: Denies    Social/Functional History  Social/Functional History  Lives With: Alone  Type of Home: Homeless (pt reports recent homelessness due to prior home living situation \"wasn't safe and I had to get out of there\")  ADL Assistance: Independent  Homemaking Assistance: Independent  Ambulation Assistance: Independent  Transfer Assistance: Independent  Active : Yes  Mode of Transportation: Car (pt reports preferring to walk for community mobility)  Occupation: On [address]: Enjoys [employer]  Additional Comments: The pt states that she is homeless because she can't go back to the house that she left       Objective   Vision: Within Functional Limits  Hearing: Within functional limits    Orientation  Overall Orientation Status: Within Functional Limits        Balance  Sitting Balance: Stand by assistance (~5 minutes total)  Standing Balance: Contact guard assistance (~5 minutes during static standing and functional mobility tasks)    Functional Mobility  Functional - Mobility Device: No device  Activity: To/from bathroom (and within hospital room)  Assist Level: Contact guard assistance  Functional Mobility Comments: Pt mild to moderately unsteady however with no true LOB. Pt impulsive with quick movements requiring min VCs for safety awareness.      ADL  Feeding: Independent  Grooming: Stand by assistance  UE Bathing: Stand by assistance  LE Bathing: Contact guard assistance  UE Dressing: Stand by assistance  LE Dressing: Contact guard assistance (seated EOB to don socks)  Toileting: Contact guard assistance     Tone RUE  RUE Tone: Normotonic  Tone LUE  LUE Tone: Normotonic  Coordination  Movements Are Fluid And Coordinated: Yes        Bed mobility  Supine to Sit: Stand by assistance  Sit to Supine: Stand by assistance  Scooting: Stand by assistance  Comment: Min VCs for safety awareness as pt with quick impulsive movements     Transfers  Sit to stand: Contact guard assistance  Stand to sit: Contact guard assistance        Cognition  Overall Cognitive Status: Exceptions  Attention Span: Attends with cues to redirect  Safety Judgement: Decreased awareness of need for assistance  Problem Solving: Assistance required to identify errors made;Assistance required to correct errors made  Insights: Decreased awareness of deficits           Sensation  Overall Sensation Status: WFL (Denies any numbness/tingling)        LUE AROM (degrees)  LUE AROM : WFL  RUE AROM (degrees)  RUE AROM : WFL  LUE Strength  Gross LUE Strength: WFL  RUE Strength  Gross RUE Strength: WFL         Plan   Plan  Times per week: 2-3x/wk  Current Treatment Recommendations: Functional Mobility Training,Endurance Training,Balance Training,Equipment Evaluation, Education, & procurement,Patient/Caregiver Education & Training,Safety Education & Training,Self-Care / ADL,Home Management Training,Strengthening    AM-PAC Score   AM-[facility] Inpatient Daily Activity Raw Score: 19 (12/28/21 1636)  AM-PAC Inpatient ADL T-Scale Score : 40.22 (12/28/21 1636)  ADL Inpatient CMS 0-100% Score: 42.8 (12/28/21 1636)  ADL Inpatient CMS G-Code Modifier : CK (12/28/21 1636)    Goals  Short term goals  Time Frame for Short term goals: Pt will, by discharge:  Short term goal 1: Perform ADL tasks independently  Short term goal 2: Perform functional transfers/functional mobility independently  Short term goal 3:  Independently demo good safety awareness during engagement in all ADLs and functional transfers/functional mobility  Short term goal 4: Demo 15+ minutes standing tolerance for increased participation in ADL/IADL tasks       Therapy Time   Individual Concurrent Group Co-treatment   Time In 1015         Time Out 1035         Minutes 20         Timed Code Treatment Minutes: Liyah, OTR/L
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
## 2021-12-28 NOTE — PROGRESS NOTES
Physical Therapy    Facility/Department: [facility]  Initial Assessment    NAME: Dafne Everett  : 1985  MRN: [id number]    Date of Service: 2021  Chief Complaint   Patient presents with    Drug Overdose     Discharge Recommendations:    No further therapy required at discharge. Assessment   Body structures, Functions, Activity limitations: Decreased functional mobility ; Decreased balance  Assessment: The pt ambulated 35 ft without a device x CGA. She was unsteady with ambulation and coulld benefit from a continuation of gait training  Prognosis: Good  Decision Making: Medium Complexity  PT Education: Goals;PT Role;Plan of Care  REQUIRES PT FOLLOW UP: Yes  Activity Tolerance  Activity Tolerance: Patient limited by fatigue;Patient limited by endurance       Patient Diagnosis(es): The encounter diagnosis was Intentional drug overdose, initial encounter ([facility] has a past medical history of ADHD, Allergy, Aspiration pneumonia ([address] Bipolar 1 disorder ([address] Bronchitis, chronic ([address] Cocaine abuse with intoxication with complication ([address] Osteoarthritis, Osteoarthritis, Rh negative state in antepartum period, Schizophrenia ([address] Seizures ([address] and Spinal stenosis. has a past surgical history that includes Cholecystectomy; hernia repair; Upper gastrointestinal endoscopy; Mandible fracture surgery; Dilation and curettage of uterus; hernia repair (); Dilation & curettage; Mandible surgery; hernia repair; and Dilation and curettage of uterus.     Restrictions  Restrictions/Precautions  Restrictions/Precautions: General Precautions  Required Braces or Orthoses?: No  Vision/Hearing  Vision: Within Functional Limits  Hearing: Within functional limits     Subjective  General  Patient assessed for rehabilitation services?: Yes  Response To Previous Treatment: Not applicable  Family / Caregiver Present: No  Follows Commands: Within Functional Limits  Subjective  Subjective: RN and pt agreeable to PT eval  Pain Screening  Patient Currently in Pain: Denies  Pain Assessment  Pain Assessment: 0-10  Pain Level: 0  Vital Signs  Patient Currently in Pain: Denies       Orientation  Orientation  Overall Orientation Status: Within Normal Limits  Social/Functional History  Social/Functional History  Lives With: Alone  Type of Home: Homeless  ADL Assistance: Independent  Ambulation Assistance: Independent  Transfer Assistance: Independent  Active : No  Mode of Transportation: Walk  Occupation: On disability  Additional Comments:  The pt states that she is homeless because she can't go back to the house that she left  Cognition      Objective        AROM RLE (degrees)  RLE AROM: WNL  AROM LLE (degrees)  LLE AROM : WNL  AROM RUE (degrees)  RUE AROM : WNL  AROM LUE (degrees)  LUE AROM : WNL  Strength RLE  Strength RLE: WNL  Strength LLE  Strength LLE: WNL  Strength RUE  Strength RUE: WNL  Strength LUE  Strength LUE: WNL     Sensation  Overall Sensation Status: WFL (Dennies numbness/tingling)  Bed mobility  Supine to Sit: Stand by assistance  Sit to Supine: Stand by assistance  Scooting: Stand by assistance  Transfers  Sit to Stand: Stand by assistance  Stand to sit: Stand by assistance  Ambulation  Ambulation?: Yes  Ambulation 1  Device: No Device  Assistance: Contact guard assistance  Distance: amb 35 ft without a device x CGA     Balance  Posture: Good  Sitting - Static: Good  Sitting - Dynamic: Good  Standing - Static: Good  Standing - Dynamic: Good      Plan   Plan  Times per week: 5-6x wk  Current Treatment Recommendations: Functional Mobility Training,Gait Training,Safety Education & Training,Stair training,Endurance Training  Safety Devices  Type of devices: Nurse notified,Left in bed,Call light within reach    G-Code       OutComes Score    AM-PAC Score  AM-PAC Inpatient Mobility Raw Score : 20 (12/28/21 1232)  AM-PAC Inpatient T-Scale Score : 47.67 (12/28/21 1232)  Mobility Inpatient CMS 0-100% Score: 35.83 (12/28/21 1232)  Mobility Inpatient CMS G-Code Modifier : CJ (12/28/21 1232)     Goals  Short term goals  Time Frame for Short term goals: 10 visits  Short term goal 1: amb 300 ft without a device x SBA  Short term goal 2: ascend/descend 4 steps with SBA  Short term goal 3: 20 min exercise program x SBA     Therapy Time   Individual Concurrent Group Co-treatment   Time In 1015         Time Out 1035         Minutes 20             1 of 135 [address] PATRICK [place]

## 2021-12-28 NOTE — PROGRESS NOTES
[address]    Admission Medication Reconciliation       The patient's list of current home medications has been reviewed. Source(s) of information: [facility] on information provided by the above source(s), I have updated the patient's home med list as described below. Please review the ACTION REQUESTED section of this note below for any discrepancies on current hospital orders. I changed or updated the following medications on the patient's home medication list:  Removed Acetaminophen  Albuterol  Budesonide  Bupropian  Cariprazie  Ibuprofen  Naproxen  Nicotine patch  Vitamins   trazadone     Added Doxepin  Seroquel 300 mg and 100 mg  Ropinirole  Prazosin  Buspirone  Trileptal 150 and 300 mg   Buspar      Adjusted      Other Notes          PROVIDER ACTION REQUESTED  Medications that need to be addressed by a physician/nurse practitioner:    Medication Action Requested                 Please feel free to call me with any questions about this encounter. Thank you.     Thank you  Orly Faulkner, PharmD, [facility]  Inpatient Clinical Pharmacist  [phone]

## 2021-12-29 ENCOUNTER — HOSPITAL ENCOUNTER (INPATIENT)
Age: 36
LOS: 2 days | Discharge: HOME | DRG: 885 | End: 2021-12-31
Attending: PSYCHIATRY & NEUROLOGY | Admitting: PSYCHIATRY & NEUROLOGY
Payer: MEDICARE

## 2021-12-29 VITALS
SYSTOLIC BLOOD PRESSURE: 103 MMHG | OXYGEN SATURATION: 95 % | RESPIRATION RATE: 19 BRPM | TEMPERATURE: 98.3 F | WEIGHT: 161 LBS | HEART RATE: 80 BPM | DIASTOLIC BLOOD PRESSURE: 73 MMHG | BODY MASS INDEX: 25.88 KG/M2 | HEIGHT: 66 IN

## 2021-12-29 PROBLEM — F32.A DEPRESSION WITH SUICIDAL IDEATION: Status: ACTIVE | Noted: 2021-12-29

## 2021-12-29 PROBLEM — R45.851 DEPRESSION WITH SUICIDAL IDEATION: Status: ACTIVE | Noted: 2021-12-29

## 2021-12-29 LAB
ABSOLUTE EOS #: 0.63 K/UL (ref 0–0.44)
ABSOLUTE IMMATURE GRANULOCYTE: 0.06 K/UL (ref 0–0.3)
ABSOLUTE LYMPH #: 1.91 K/UL (ref 1.1–3.7)
ABSOLUTE MONO #: 0.89 K/UL (ref 0.1–1.2)
ANION GAP SERPL CALCULATED.3IONS-SCNC: 13 MMOL/L (ref 9–17)
BASOPHILS # BLD: 1 % (ref 0–2)
BASOPHILS ABSOLUTE: 0.07 K/UL (ref 0–0.2)
BUN BLDV-MCNC: 8 MG/DL (ref 6–20)
BUN/CREAT BLD: NORMAL (ref 9–20)
CALCIUM SERPL-MCNC: 9.1 MG/DL (ref 8.6–10.4)
CHLORIDE BLD-SCNC: 105 MMOL/L (ref 98–107)
CO2: 21 MMOL/L (ref 20–31)
CREAT SERPL-MCNC: 0.63 MG/DL (ref 0.5–0.9)
DIFFERENTIAL TYPE: ABNORMAL
EKG ATRIAL RATE: 76 BPM
EKG P AXIS: 77 DEGREES
EKG P-R INTERVAL: 160 MS
EKG Q-T INTERVAL: 398 MS
EKG QRS DURATION: 84 MS
EKG QTC CALCULATION (BAZETT): 447 MS
EKG R AXIS: 68 DEGREES
EKG T AXIS: 63 DEGREES
EKG VENTRICULAR RATE: 76 BPM
EOSINOPHILS RELATIVE PERCENT: 8 % (ref 1–4)
GFR AFRICAN AMERICAN: >60 ML/MIN
GFR NON-AFRICAN AMERICAN: >60 ML/MIN
GFR SERPL CREATININE-BSD FRML MDRD: NORMAL ML/MIN/{1.73_M2}
GFR SERPL CREATININE-BSD FRML MDRD: NORMAL ML/MIN/{1.73_M2}
GLUCOSE BLD-MCNC: 100 MG/DL (ref 65–105)
GLUCOSE BLD-MCNC: 104 MG/DL (ref 65–105)
GLUCOSE BLD-MCNC: 98 MG/DL (ref 70–99)
HCT VFR BLD CALC: 42.4 % (ref 36.3–47.1)
HEMOGLOBIN: 14.3 G/DL (ref 11.9–15.1)
IMMATURE GRANULOCYTES: 1 %
LYMPHOCYTES # BLD: 23 % (ref 24–43)
MCH RBC QN AUTO: 30.5 PG (ref 25.2–33.5)
MCHC RBC AUTO-ENTMCNC: 33.7 G/DL (ref 28.4–34.8)
MCV RBC AUTO: 90.4 FL (ref 82.6–102.9)
MONOCYTES # BLD: 11 % (ref 3–12)
NRBC AUTOMATED: 0 PER 100 WBC
PDW BLD-RTO: 12.9 % (ref 11.8–14.4)
PLATELET # BLD: 416 K/UL (ref 138–453)
PLATELET ESTIMATE: ABNORMAL
PMV BLD AUTO: 10.4 FL (ref 8.1–13.5)
POTASSIUM SERPL-SCNC: 4 MMOL/L (ref 3.7–5.3)
RBC # BLD: 4.69 M/UL (ref 3.95–5.11)
RBC # BLD: ABNORMAL 10*6/UL
SARS-COV-2, RAPID: NOT DETECTED
SEG NEUTROPHILS: 56 % (ref 36–65)
SEGMENTED NEUTROPHILS ABSOLUTE COUNT: 4.73 K/UL (ref 1.5–8.1)
SODIUM BLD-SCNC: 139 MMOL/L (ref 135–144)
SPECIMEN DESCRIPTION: NORMAL
WBC # BLD: 8.3 K/UL (ref 3.5–11.3)
WBC # BLD: ABNORMAL 10*3/UL

## 2021-12-29 PROCEDURE — 6360000002 HC RX W HCPCS: Performed by: STUDENT IN AN ORGANIZED HEALTH CARE EDUCATION/TRAINING PROGRAM

## 2021-12-29 PROCEDURE — 6370000000 HC RX 637 (ALT 250 FOR IP): Performed by: STUDENT IN AN ORGANIZED HEALTH CARE EDUCATION/TRAINING PROGRAM

## 2021-12-29 PROCEDURE — 80048 BASIC METABOLIC PNL TOTAL CA: CPT

## 2021-12-29 PROCEDURE — 82947 ASSAY GLUCOSE BLOOD QUANT: CPT

## 2021-12-29 PROCEDURE — 93005 ELECTROCARDIOGRAM TRACING: CPT | Performed by: STUDENT IN AN ORGANIZED HEALTH CARE EDUCATION/TRAINING PROGRAM

## 2021-12-29 PROCEDURE — 97116 GAIT TRAINING THERAPY: CPT

## 2021-12-29 PROCEDURE — 99232 SBSQ HOSP IP/OBS MODERATE 35: CPT | Performed by: INTERNAL MEDICINE

## 2021-12-29 PROCEDURE — 94760 N-INVAS EAR/PLS OXIMETRY 1: CPT

## 2021-12-29 PROCEDURE — 87635 SARS-COV-2 COVID-19 AMP PRB: CPT

## 2021-12-29 PROCEDURE — 85025 COMPLETE CBC W/AUTO DIFF WBC: CPT

## 2021-12-29 PROCEDURE — 36415 COLL VENOUS BLD VENIPUNCTURE: CPT

## 2021-12-29 PROCEDURE — 2580000003 HC RX 258: Performed by: STUDENT IN AN ORGANIZED HEALTH CARE EDUCATION/TRAINING PROGRAM

## 2021-12-29 PROCEDURE — 97530 THERAPEUTIC ACTIVITIES: CPT

## 2021-12-29 PROCEDURE — 1240000000 HC EMOTIONAL WELLNESS R&B

## 2021-12-29 RX ORDER — DIPHENHYDRAMINE HYDROCHLORIDE 50 MG/ML
50 INJECTION INTRAMUSCULAR; INTRAVENOUS EVERY 6 HOURS PRN
Status: DISCONTINUED | OUTPATIENT
Start: 2021-12-29 | End: 2021-12-31 | Stop reason: HOSPADM

## 2021-12-29 RX ORDER — MAGNESIUM HYDROXIDE/ALUMINUM HYDROXICE/SIMETHICONE 120; 1200; 1200 MG/30ML; MG/30ML; MG/30ML
30 SUSPENSION ORAL EVERY 6 HOURS PRN
Status: DISCONTINUED | OUTPATIENT
Start: 2021-12-29 | End: 2021-12-31 | Stop reason: HOSPADM

## 2021-12-29 RX ORDER — HALOPERIDOL 5 MG
5 TABLET ORAL EVERY 4 HOURS PRN
Status: DISCONTINUED | OUTPATIENT
Start: 2021-12-29 | End: 2021-12-31 | Stop reason: HOSPADM

## 2021-12-29 RX ORDER — NICOTINE 21 MG/24HR
1 PATCH, TRANSDERMAL 24 HOURS TRANSDERMAL DAILY
Status: DISCONTINUED | OUTPATIENT
Start: 2021-12-30 | End: 2021-12-31 | Stop reason: HOSPADM

## 2021-12-29 RX ORDER — DIAPER,BRIEF,INFANT-TODD,DISP
EACH MISCELLANEOUS 2 TIMES DAILY
Status: DISCONTINUED | OUTPATIENT
Start: 2021-12-29 | End: 2021-12-29 | Stop reason: HOSPADM

## 2021-12-29 RX ORDER — LORAZEPAM 1 MG/1
2 TABLET ORAL EVERY 4 HOURS PRN
Status: DISCONTINUED | OUTPATIENT
Start: 2021-12-29 | End: 2021-12-31 | Stop reason: HOSPADM

## 2021-12-29 RX ORDER — LORAZEPAM 2 MG/ML
2 INJECTION INTRAMUSCULAR EVERY 4 HOURS PRN
Status: DISCONTINUED | OUTPATIENT
Start: 2021-12-29 | End: 2021-12-31 | Stop reason: HOSPADM

## 2021-12-29 RX ORDER — TRAZODONE HYDROCHLORIDE 50 MG/1
50 TABLET ORAL NIGHTLY PRN
Status: DISCONTINUED | OUTPATIENT
Start: 2021-12-29 | End: 2021-12-31 | Stop reason: HOSPADM

## 2021-12-29 RX ORDER — SENNA AND DOCUSATE SODIUM 50; 8.6 MG/1; MG/1
2 TABLET, FILM COATED ORAL DAILY PRN
Qty: 60 TABLET | Refills: 2 | Status: ON HOLD | OUTPATIENT
Start: 2021-12-29 | End: 2021-12-31 | Stop reason: HOSPADM

## 2021-12-29 RX ORDER — IBUPROFEN 400 MG/1
400 TABLET ORAL EVERY 6 HOURS PRN
Status: DISCONTINUED | OUTPATIENT
Start: 2021-12-29 | End: 2021-12-31 | Stop reason: HOSPADM

## 2021-12-29 RX ORDER — HYDROXYZINE 50 MG/1
50 TABLET, FILM COATED ORAL 3 TIMES DAILY PRN
Status: DISCONTINUED | OUTPATIENT
Start: 2021-12-29 | End: 2021-12-31 | Stop reason: HOSPADM

## 2021-12-29 RX ORDER — ACETAMINOPHEN 325 MG/1
650 TABLET ORAL EVERY 4 HOURS PRN
Status: DISCONTINUED | OUTPATIENT
Start: 2021-12-29 | End: 2021-12-31 | Stop reason: HOSPADM

## 2021-12-29 RX ORDER — HALOPERIDOL 5 MG/ML
5 INJECTION INTRAMUSCULAR EVERY 4 HOURS PRN
Status: DISCONTINUED | OUTPATIENT
Start: 2021-12-29 | End: 2021-12-31 | Stop reason: HOSPADM

## 2021-12-29 RX ORDER — POLYETHYLENE GLYCOL 3350 17 G/17G
17 POWDER, FOR SOLUTION ORAL DAILY PRN
Status: DISCONTINUED | OUTPATIENT
Start: 2021-12-29 | End: 2021-12-31 | Stop reason: HOSPADM

## 2021-12-29 RX ADMIN — HEPARIN SODIUM 5000 UNITS: 5000 INJECTION INTRAVENOUS; SUBCUTANEOUS at 06:03

## 2021-12-29 RX ADMIN — ACETAMINOPHEN 650 MG: 325 TABLET ORAL at 21:59

## 2021-12-29 RX ADMIN — HYDROCORTISONE: 10 CREAM TOPICAL at 20:45

## 2021-12-29 RX ADMIN — PANTOPRAZOLE SODIUM 40 MG: 40 TABLET, DELAYED RELEASE ORAL at 06:03

## 2021-12-29 RX ADMIN — PANTOPRAZOLE SODIUM 40 MG: 40 TABLET, DELAYED RELEASE ORAL at 17:05

## 2021-12-29 RX ADMIN — SODIUM CHLORIDE, PRESERVATIVE FREE 10 ML: 5 INJECTION INTRAVENOUS at 08:59

## 2021-12-29 RX ADMIN — ACETAMINOPHEN 650 MG: 325 TABLET ORAL at 06:11

## 2021-12-29 ASSESSMENT — PAIN DESCRIPTION - FREQUENCY
FREQUENCY: CONTINUOUS
FREQUENCY: CONTINUOUS

## 2021-12-29 ASSESSMENT — PAIN SCALES - GENERAL
PAINLEVEL_OUTOF10: 3
PAINLEVEL_OUTOF10: 3

## 2021-12-29 ASSESSMENT — PAIN DESCRIPTION - PAIN TYPE
TYPE: ACUTE PAIN
TYPE: CHRONIC PAIN

## 2021-12-29 ASSESSMENT — PAIN DESCRIPTION - LOCATION
LOCATION: BACK
LOCATION: HEAD

## 2021-12-29 NOTE — PROGRESS NOTES
Physical Therapy  Facility/Department: [facility]  Daily Treatment Note  NAME: Roselia December  : 1985  MRN: [id number]    Date of Service: 2021    Discharge Recommendations:  Continue to assess pending progress        Assessment   Body structures, Functions, Activity limitations: Decreased functional mobility ; Decreased balance  Assessment: The able t o amb 300ft witout AD requyiring SBA for safety and pt is impulsive and demo unsteady gait; performed 2flight of stairs with CGA. She would continue to benefit from IP therapy to return  to PLOF. Prognosis: Good  PT Education: Goals;PT Role;Plan of Care;General Safety;Gait Training;Functional Mobility Training  REQUIRES PT FOLLOW UP: Yes  Activity Tolerance  Activity Tolerance: Patient limited by fatigue;Patient Tolerated treatment well     Patient Diagnosis(es): The encounter diagnosis was Intentional drug overdose, initial encounter ([address] has a past medical history of ADHD, Allergy, Aspiration pneumonia ([address] Bipolar 1 disorder ([address] Bronchitis, chronic ([address] Cocaine abuse with intoxication with complication ([address] Osteoarthritis, Osteoarthritis, Rh negative state in antepartum period, Schizophrenia ([address] Seizures ([address] and Spinal stenosis. has a past surgical history that includes Cholecystectomy; hernia repair; Upper gastrointestinal endoscopy; Mandible fracture surgery; Dilation and curettage of uterus; hernia repair (); Dilation & curettage; Mandible surgery; hernia repair; and Dilation and curettage of uterus. Restrictions  Restrictions/Precautions  Restrictions/Precautions: Fall Risk  Required Braces or Orthoses?: No  Subjective   General  Response To Previous Treatment: Patient with no complaints from previous session.   Family / Caregiver Present: No  Subjective  Subjective: RN and pt agreeable to PT  Pain Screening  Patient Currently in Pain: Denies  Vital Signs  Patient Currently in Pain: Denies Orientation  Orientation  Overall Orientation Status: Within Normal Limits  Cognition      Objective   Bed mobility  Supine to Sit: Stand by assistance  Sit to Supine: Stand by assistance  Scooting: Stand by assistance  Comment: Pt very impulsive , constant cueing for safety . Transfers  Sit to Stand: Stand by assistance  Stand to sit: Stand by assistance  Comment: Rios LOB upon standing, but able to self correct. Ambulation  Ambulation?: Yes  Ambulation 1  Device: No Device  Assistance: Contact guard assistance  Distance: 300ft  Stairs/Curb  Stairs?: Yes  Stairs  # Steps : 18  Stairs Height: 6\"  Rails: Right ascending  Assistance: Contact guard assistance  Comment: Poor safety awareness. Mild SOB noted with 2 standing rest breaks.      Balance  Posture: Good  Sitting - Static: Good  Sitting - Dynamic: Good  Standing - Static: Good  Standing - Dynamic: Good;-      AM-PAC Score  AM-PAC Inpatient Mobility Raw Score : 22 (12/29/21 1533)  AM-PAC Inpatient T-Scale Score : 53.28 (12/29/21 1533)  Mobility Inpatient CMS 0-100% Score: 20.91 (12/29/21 1533)  Mobility Inpatient CMS G-Code Modifier : CJ (12/29/21 1533)          Goals  Short term goals  Time Frame for Short term goals: 10 visits  Short term goal 1: amb 300 ft without a device x SBA  Short term goal 2: ascend/descend 4 steps with SBA  Short term goal 3: 20 min exercise program x SBA    Plan    Plan  Times per week: 5-6x wk  Current Treatment Recommendations: Functional Mobility Training,Gait Training,Safety Education & Training,Stair training,Endurance Training  Safety Devices  Type of devices: Nurse notified,Left in bed,Call light within reach,Gait belt,Sitter present     Therapy Time   Individual Concurrent Group Co-treatment   Time In 0619         Time Out 1400         Minutes 27         Timed Code Treatment Minutes: 79 Lavinia Melara, PTA

## 2021-12-29 NOTE — PLAN OF CARE
Problem: OXYGENATION/RESPIRATORY FUNCTION  Goal: Patient will maintain patent airway  Outcome: Ongoing  Goal: Patient will achieve/maintain normal respiratory rate/effort  Description: Respiratory rate and effort will be within normal limits for the patient  Outcome: Ongoing     Problem: SKIN INTEGRITY  Goal: Skin integrity is maintained or improved  Outcome: Ongoing     Problem: Nutrition  Goal: Optimal nutrition therapy  Outcome: Ongoing     Problem: Musculor/Skeletal Functional Status  Goal: Highest potential functional level  Outcome: Ongoing     Problem: Skin Integrity:  Goal: Will show no infection signs and symptoms  Description: Will show no infection signs and symptoms  Outcome: Ongoing  Goal: Absence of new skin breakdown  Description: Absence of new skin breakdown  Outcome: Ongoing     Problem: Falls - Risk of:  Goal: Will remain free from falls  Description: Will remain free from falls  Outcome: Ongoing  Goal: Absence of physical injury  Description: Absence of physical injury  Outcome: Ongoing     Problem: Pain:  Goal: Pain level will decrease  Description: Pain level will decrease  Outcome: Ongoing  Goal: Control of acute pain  Description: Control of acute pain  Outcome: Ongoing  Goal: Control of chronic pain  Description: Control of chronic pain  Outcome: Ongoing     Problem: Confusion - Acute:  Goal: Absence of continued neurological deterioration signs and symptoms  Description: Absence of continued neurological deterioration signs and symptoms  Outcome: Ongoing  Goal: Mental status will be restored to baseline  Description: Mental status will be restored to baseline  Outcome: Ongoing     Problem: Discharge Planning:  Goal: Ability to perform activities of daily living will improve  Description: Ability to perform activities of daily living will improve  Outcome: Ongoing  Goal: Participates in care planning  Description: Participates in care planning  Outcome: Ongoing     Problem: Injury - Risk of, Physical Injury:  Goal: Will remain free from falls  Description: Will remain free from falls  Outcome: Ongoing  Goal: Absence of physical injury  Description: Absence of physical injury  Outcome: Ongoing     Problem: Mood - Altered:  Goal: Mood stable  Description: Mood stable  Outcome: Ongoing  Goal: Absence of abusive behavior  Description: Absence of abusive behavior  Outcome: Ongoing  Goal: Verbalizations of feeling emotionally comfortable while being cared for will increase  Description: Verbalizations of feeling emotionally comfortable while being cared for will increase  Outcome: Ongoing     Problem: Psychomotor Activity - Altered:  Goal: Absence of psychomotor disturbance signs and symptoms  Description: Absence of psychomotor disturbance signs and symptoms  Outcome: Ongoing     Problem: Sensory Perception - Impaired:  Goal: Demonstrations of improved sensory functioning will increase  Description: Demonstrations of improved sensory functioning will increase  Outcome: Ongoing  Goal: Decrease in sensory misperception frequency  Description: Decrease in sensory misperception frequency  Outcome: Ongoing  Goal: Able to refrain from responding to false sensory perceptions  Description: Able to refrain from responding to false sensory perceptions  Outcome: Ongoing  Goal: Demonstrates accurate environmental perceptions  Description: Demonstrates accurate environmental perceptions  Outcome: Ongoing  Goal: Able to distinguish between reality-based and nonreality-based thinking  Description: Able to distinguish between reality-based and nonreality-based thinking  Outcome: Ongoing  Goal: Able to interrupt nonreality-based thinking  Description: Able to interrupt nonreality-based thinking  Outcome: Ongoing     Problem: Sleep Pattern Disturbance:  Goal: Appears well-rested  Description: Appears well-rested  Outcome: Ongoing

## 2021-12-29 NOTE — FLOWSHEET NOTE
SPIRITUAL CARE DEPARTMENT - [address]  PROGRESS NOTE    Shift date: 12/28/21  Shift day: Tuesday   Shift # 2    Room # 2446/6201-05   Name: Jimena Driscoll            Age: 29 y.o. Gender: female          Mandaeism: [address] of Yazidism:     Referral: Patient/Nurse    Admit Date & Time: 12/24/2021 10:45 AM    PATIENT/EVENT DESCRIPTION:  Nurse contacted / Patient Requested      SPIRITUAL ASSESSMENT/INTERVENTION:   was welcomed into the room. Patient appeared to be tearful and anxious. Patient engaged in conversation and explained many dynamics of her life.  listened and validated concerns and feelings. Patient requested a Deepak Formica which [employer] e-channel had a copy of [address] in car and gave to patient.  offered prayer which patient accepted.  returned with Bible and patient expressed gratitude and appeared to be comforted by visit. SPIRITUAL CARE FOLLOW-UP PLAN:  Chaplains will remain available to offer spiritual and emotional support as needed. Electronically signed by Jacobo Saenz Resident, on 12/28/2021 at [address] [place] [address]  [phone]       12/28/21 2111   Encounter Summary   Services provided to: Patient   Referral/Consult From: Patient;Nurse   Continue Visiting   (12/26/21)   Complexity of Encounter High   Length of Encounter 1 hour   Spiritual Assessment Completed Yes   Spiritual/Latter-day   Type Spiritual struggle   Assessment Tearful; Anxious; Coping   Intervention Prayer;Scripture;Provided reading materials/devotional materials   Outcome Tearful;Comfort
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
## 2021-12-29 NOTE — PROGRESS NOTES
Now presented again with poor wound healing, wound dehiscence, possible cellulitis, osteomyelitis  Plan is as stated above under 1  And 2  Pt was evaluated for the concern of rash on her abdomen and right axilla. She states it has started yesterday after her tessalon and it is mildly itchy and non painful as per pt. Denies any fever and chills. As per my evaluation, Rash is mild erythematous seems allergic in etiology and  non infectious. Will try hydrocortisone 1% topical     Pt is stable to be discharged to [facility]. Francesca Major MD  Internal Medicine Resident, PGY- [facility];  [facility], [facility]  12/29/2021, 5:39 PM

## 2021-12-29 NOTE — PROGRESS NOTES
Refused blood sugar check, reports I'm not diabetic and I'm tired of getting poked.  Resident notified

## 2021-12-29 NOTE — PROGRESS NOTES
CLINICAL PHARMACY NOTE: MEDS TO BEDS    Total # of Prescriptions Filled: 0   The following medications were delivered to the patient:  · See notes    Additional Documentation:Tried to deliver medication but patient said she has it at home. When I told RN she said patient is going to [facility].

## 2021-12-30 PROBLEM — F31.4 BIPOLAR AFFECTIVE DISORDER, DEPRESSED, SEVERE (HCC): Status: ACTIVE | Noted: 2021-12-30

## 2021-12-30 LAB
CULTURE: NORMAL
CULTURE: NORMAL
Lab: NORMAL
Lab: NORMAL
SPECIMEN DESCRIPTION: NORMAL
SPECIMEN DESCRIPTION: NORMAL

## 2021-12-30 PROCEDURE — 99222 1ST HOSP IP/OBS MODERATE 55: CPT | Performed by: PSYCHIATRY & NEUROLOGY

## 2021-12-30 PROCEDURE — 6370000000 HC RX 637 (ALT 250 FOR IP): Performed by: PSYCHIATRY & NEUROLOGY

## 2021-12-30 PROCEDURE — 1240000000 HC EMOTIONAL WELLNESS R&B

## 2021-12-30 PROCEDURE — APPSS60 APP SPLIT SHARED TIME 46-60 MINUTES: Performed by: PSYCHIATRY & NEUROLOGY

## 2021-12-30 RX ORDER — DIAPER,BRIEF,INFANT-TODD,DISP
EACH MISCELLANEOUS 2 TIMES DAILY
Status: ON HOLD | COMMUNITY
End: 2021-12-31 | Stop reason: HOSPADM

## 2021-12-30 RX ORDER — BENZOCAINE/MENTHOL 6 MG-10 MG
LOZENGE MUCOUS MEMBRANE 2 TIMES DAILY
Status: DISCONTINUED | OUTPATIENT
Start: 2021-12-30 | End: 2021-12-31 | Stop reason: HOSPADM

## 2021-12-30 RX ORDER — SERTRALINE HYDROCHLORIDE 25 MG/1
25 TABLET, FILM COATED ORAL DAILY
Status: DISCONTINUED | OUTPATIENT
Start: 2021-12-30 | End: 2021-12-31 | Stop reason: HOSPADM

## 2021-12-30 RX ORDER — ARIPIPRAZOLE 5 MG/1
5 TABLET ORAL DAILY
Status: DISCONTINUED | OUTPATIENT
Start: 2021-12-30 | End: 2021-12-31 | Stop reason: HOSPADM

## 2021-12-30 RX ORDER — PRAZOSIN HYDROCHLORIDE 1 MG/1
1 CAPSULE ORAL NIGHTLY
Status: DISCONTINUED | OUTPATIENT
Start: 2021-12-30 | End: 2021-12-31 | Stop reason: HOSPADM

## 2021-12-30 RX ADMIN — PRAZOSIN HYDROCHLORIDE 1 MG: 1 CAPSULE ORAL at 21:13

## 2021-12-30 RX ADMIN — ARIPIPRAZOLE 5 MG: 5 TABLET ORAL at 15:03

## 2021-12-30 RX ADMIN — HYDROXYZINE HYDROCHLORIDE 50 MG: 50 TABLET, FILM COATED ORAL at 00:31

## 2021-12-30 RX ADMIN — ACETAMINOPHEN 650 MG: 325 TABLET, FILM COATED ORAL at 10:08

## 2021-12-30 RX ADMIN — IBUPROFEN 400 MG: 400 TABLET, FILM COATED ORAL at 21:13

## 2021-12-30 RX ADMIN — TRAZODONE HYDROCHLORIDE 50 MG: 50 TABLET ORAL at 00:31

## 2021-12-30 RX ADMIN — HYDROXYZINE HYDROCHLORIDE 50 MG: 50 TABLET, FILM COATED ORAL at 21:13

## 2021-12-30 RX ADMIN — TRAZODONE HYDROCHLORIDE 50 MG: 50 TABLET ORAL at 21:13

## 2021-12-30 RX ADMIN — ACETAMINOPHEN 650 MG: 325 TABLET, FILM COATED ORAL at 00:14

## 2021-12-30 RX ADMIN — HYDROCORTISONE: 0.01 CREAM TOPICAL at 21:15

## 2021-12-30 RX ADMIN — SERTRALINE HYDROCHLORIDE 25 MG: 25 TABLET ORAL at 15:03

## 2021-12-30 ASSESSMENT — SLEEP AND FATIGUE QUESTIONNAIRES
DO YOU HAVE DIFFICULTY SLEEPING: NO
DIFFICULTY ARISING: YES
DIFFICULTY STAYING ASLEEP: YES
DIFFICULTY FALLING ASLEEP: YES
SLEEP PATTERN: DIFFICULTY FALLING ASLEEP;DIFFICULTY ARISING;RESTLESSNESS
AVERAGE NUMBER OF SLEEP HOURS: 4
RESTFUL SLEEP: NO
DO YOU USE A SLEEP AID: YES

## 2021-12-30 ASSESSMENT — PAIN DESCRIPTION - FREQUENCY: FREQUENCY: CONTINUOUS

## 2021-12-30 ASSESSMENT — PAIN DESCRIPTION - DESCRIPTORS
DESCRIPTORS: ACHING
DESCRIPTORS: ACHING;TIGHTNESS

## 2021-12-30 ASSESSMENT — PAIN DESCRIPTION - PAIN TYPE: TYPE: ACUTE PAIN

## 2021-12-30 ASSESSMENT — PAIN DESCRIPTION - ONSET: ONSET: ON-GOING

## 2021-12-30 ASSESSMENT — PAIN SCALES - GENERAL
PAINLEVEL_OUTOF10: 9
PAINLEVEL_OUTOF10: 0
PAINLEVEL_OUTOF10: 6
PAINLEVEL_OUTOF10: 8
PAINLEVEL_OUTOF10: 5

## 2021-12-30 ASSESSMENT — PAIN DESCRIPTION - LOCATION
LOCATION: BACK
LOCATION: BACK

## 2021-12-30 ASSESSMENT — LIFESTYLE VARIABLES: HISTORY_ALCOHOL_USE: NO

## 2021-12-30 ASSESSMENT — PAIN DESCRIPTION - PROGRESSION: CLINICAL_PROGRESSION: NOT CHANGED

## 2021-12-30 ASSESSMENT — PAIN - FUNCTIONAL ASSESSMENT: PAIN_FUNCTIONAL_ASSESSMENT: ACTIVITIES ARE NOT PREVENTED

## 2021-12-30 ASSESSMENT — PAIN DESCRIPTION - ORIENTATION: ORIENTATION: LOWER

## 2021-12-30 NOTE — GROUP NOTE
Group Therapy Note    Date: 12/30/2021    Group Start Time: 1340  Group End Time: 1430  Group Topic: Psychoeducation    IBETH Quach, PEDROS    Group Therapy Note    Attendees: 9    Patient's Goal:  Patient will demonstrate improved interpersonal skills    Notes:  Patient attended group and participated.     Status After Intervention:  Improved    Participation Level: Interactive but sometimes intrusive to others    Participation Quality: Appropriate, Attentive, Sharing and Supportive      Speech:  normal      Thought Process/Content: Logical  Linear      Affective Functioning: Constricted      Mood: euthymic      Level of consciousness:  Alert, Oriented x4 and Attentive      Response to Learning: Able to verbalize current knowledge/experience, Able to verbalize/acknowledge new learning, Able to retain information, Able to change behavior and Progressing to goal      Endings: None Reported    Modes of Intervention: Education, Support, Socialization and Exploration      Discipline Responsible: Psychoeducational Specialist      Signature:  Mattie Alberts, [address]

## 2021-12-30 NOTE — PROGRESS NOTES
[facility] here to transport patient to [address]. EMT's given report on patient. Ptient packet , belongings and medications given to EMT's. Patient transported via stretcher. Report called to RN at [facility]. All questions answered. RN given unit phone number in case any other questions arise.

## 2021-12-30 NOTE — GROUP NOTE
Group Therapy Note    Date: 12/30/2021    Group Start Time: 1100  Group End Time: 1150  Group Topic: Psychoeducation    IBETH Quach, PEDROS    Group Therapy Note    Attendees: 10    Patient's Goal:  Patient will identify benefits of creative expression for relaxation and coping    Notes:  Patient attended group and participated    Status After Intervention:  Improved    Participation Level:  Active Listener and Interactive    Participation Quality: Appropriate, Attentive, Sharing      Speech:  normal      Thought Process/Content: Logical  Linear      Affective Functioning: Constricted/Restricted      Mood: depressed and anxious      Level of consciousness:  Alert, Oriented x4 and Attentive      Response to Learning: Able to verbalize current knowledge/experience, Able to verbalize/acknowledge new learning, Able to retain information, Capable of insight, Able to change behavior and Progressing to goal      Endings: None Reported    Modes of Intervention: Education, Support, Socialization, Exploration and Activity      Discipline Responsible: Psychoeducational Specialist      Signature:  Fatimah Prasad, [address]

## 2021-12-30 NOTE — BH NOTE
Patient given tobacco quitline number 06580150348 at this time, refusing to call at this time, states \" I just dont want to quit now\"- patient given information as to the dangers of long term tobacco use. Continue to reinforce the importance of tobacco cessation.

## 2021-12-30 NOTE — PLAN OF CARE
Problem: Altered Mood, Depressive Behavior:  Goal: Ability to disclose and discuss suicidal ideas will improve  Description: Ability to disclose and discuss suicidal ideas will improve  Patient denies suicidal ideas; patient states she has never been suicidal    Problem: Altered Mood, Depressive Behavior:  Goal: Absence of self-harm  Description: Absence of self-harm  Patient remained absent of self-harm; patient denies thoughts to self-harm; 15-min safety checks continued

## 2021-12-30 NOTE — GROUP NOTE
Group Therapy Note    Date: 12/30/2021    Group Start Time: 1000  Group End Time: 9773  Group Topic: Psychoeducation    IBETH FAY    MARION Blount LSW        Group Therapy Note    Attendees: 5         Patient's Goal:  Pt will demonstrate increased interpersonal interaction.     Notes:  Group topic was Values     Status After Intervention:  Improved    Participation Level: Interactive    Participation Quality: Appropriate, Attentive and Sharing      Speech:  normal      Thought Process/Content: Logical      Affective Functioning: Congruent      Mood: elevated      Level of consciousness:  Alert, Oriented x4 and Attentive      Response to Learning: Able to verbalize current knowledge/experience, Able to retain information, Capable of insight, Able to change behavior and Progressing to goal      Endings: None Reported    Modes of Intervention: Education, Support and Socialization      Discipline Responsible: /Counselor      Signature:  MARION Blount LSW
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
## 2021-12-30 NOTE — PROGRESS NOTES
Behavioral Services  Medicare Certification Upon Admission    I certify that this patient's inpatient psychiatric hospital admission is medically necessary for:    [x] (1) Treatment which could reasonably be expected to improve this patient's condition,       [x] (2) Or for diagnostic study;     AND     [x](2) The inpatient psychiatric services are provided while the individual is under the care of a physician and are included in the individualized plan of care.     Estimated length of stay/service -5-9 days    Plan for post-hospital care -outpatient care    Electronically signed by Chance Siddiqi MD on 12/30/2021 at 12:00 PM
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
## 2021-12-30 NOTE — BH NOTE
[facility]  Admission Note     Admission Type:   Admission Type: Voluntary    Reason for admission:  Reason for Admission: Suspected overdose and altered mental status    PATIENT STRENGTHS:  Strengths: Social Skills,Positive Support    Patient Strengths and Limitations:  Limitations: Tendency to isolate self,General negative or hopeless attitude about future/recovery    Addictive Behavior:   Addictive Behavior  In the past 3 months, have you felt or has someone told you that you have a problem with:  : None  Do you have a history of Chemical Use?: No  Do you have a history of Alcohol Use?: No  Do you have a history of Street Drug Abuse?: No  Histroy of Prescripton Drug Abuse?: No    Medical Problems:   Past Medical History:   Diagnosis Date    ADHD     Allergy     dilaudid    Aspiration [address] ([facility] 12/27/2021    Bipolar 1 disorder ([facility]     Bronchitis, chronic ([facility]     Cocaine abuse with intoxication with complication ([facility] 8/2/8738    Depression with suicidal ideation 12/29/2021    Osteoarthritis     Osteoarthritis     Rh negative state in antepartum period 5/8/2012    Schizophrenia ([facility]     Seizures ([facility] 7/5/2016    Spinal stenosis        Status EXAM:  Status and Exam  Normal: No  Facial Expression: Worried  Affect: Appropriate  Level of Consciousness: Alert  Mood:Normal: No  Mood: Sad,Depressed  Motor Activity:Normal: Yes  Interview Behavior: Cooperative  Preception: [place] to Person,[place] to Time,[place] to Place,[place] to Situation  Attention:Normal: No  Attention: Distractible  Thought Processes: Flt.of Ideas  Thought Content:Normal: Yes  Hallucinations: None  Delusions: No  Memory:Normal: No  Memory: Poor Remote,Poor Recent  Insight and Judgment: No  Insight and Judgment: Poor Insight,Poor Judgment  Present Suicidal Ideation: No  Present Homicidal Ideation: No    Tobacco Screening:  Practical Counseling, on admission, larry X, if applicable and completed (first 3 are required if patient doesn't refuse):            ( )  Recognizing danger situations (included triggers and roadblocks)                    ( )  Coping skills (new ways to manage stress, exercise, relaxation techniques, changing routine, distraction)                                                           ( )  Basic information about quitting (benefits of quitting, techniques in how to quit, available resources  ( ) Referral for counseling faxed to [facility]                                           ( ) Patient refused counseling  ( ) Patient has not smoked in the last 30 days    Metabolic Screening:    No results found for: LABA1C    No results found for: CHOL  No results found for: TRIG  No results found for: HDL  No components found for: LDLCAL  No results found for: LABVLDL      Body mass index is 25.99 kg/m². BP Readings from Last 2 Encounters:   12/30/21 107/81   12/29/21 103/73           Pt admitted with followings belongings:  Dental Appliances: None  Vision - Corrective Lenses: None  Hearing Aid: None  Jewelry: Other (Comment) (dermal piercing)  Clothing: Footwear,Pants,Shirt,Sweater,Socks,Undergarments (Comment)  Were All Patient Medications Collected?: Yes  Other Valuables: None     Patient's home medications need verified. Patient oriented to surroundings and program expectations and copy of patient rights given. Received admission packet:  Yes. Consents reviewed, signed Yes. Patient verbalize understanding:  Yes. Patient education on precautions: Yes    Patient did not arrive to unit with a cell phone.                     Armando Saldaña RN

## 2021-12-30 NOTE — PLAN OF CARE
[facility]  Initial Interdisciplinary Treatment Plan NO      Original treatment plan Date & Time: 12/30/2021 0824    Admission Type:  Admission Type: Voluntary    Reason for admission:   Reason for Admission: Suspected overdose and altered mental status    Estimated Length of Stay:  5-7days  Estimated Discharge Date: to be determined by physician    PATIENT STRENGTHS:  Patient Strengths:Strengths: Social Skills,Positive Support  Patient Strengths and Limitations:Limitations: Tendency to isolate self,General negative or hopeless attitude about future/recovery  Addictive Behavior: Addictive Behavior  In the past 3 months, have you felt or has someone told you that you have a problem with:  : None  Do you have a history of Chemical Use?: No  Do you have a history of Alcohol Use?: No  Do you have a history of Street Drug Abuse?: No  Histroy of Prescripton Drug Abuse?: No  Medical Problems:  Past Medical History:   Diagnosis Date    ADHD     Allergy     dilaudid    Aspiration pneumonia ([facility] 12/27/2021    Bipolar 1 disorder ([facility]     Bronchitis, chronic ([facility]     Cocaine abuse with intoxication with complication ([facility] 5/8/5418    Depression with suicidal ideation 12/29/2021    Osteoarthritis     Osteoarthritis     Rh negative state in antepartum period 5/8/2012    Schizophrenia ([facility]     Seizures ([facility] 7/5/2016    Spinal stenosis      Status EXAM:Status and Exam  Normal: No  Facial Expression: Worried  Affect: Appropriate  Level of Consciousness: Alert  Mood:Normal: No  Mood: Sad,Depressed  Motor Activity:Normal: Yes  Interview Behavior: Cooperative  Preception: [place] to Person,[place] to Time,[place] to Place,[place] to Situation  Attention:Normal: No  Attention: Distractible  Thought Processes: Flt.of Ideas  Thought Content:Normal: Yes  Hallucinations: None  Delusions: No  Memory:Normal: No  Memory: Poor Remote,Poor Recent  Insight and Judgment: No  Insight and Judgment: Poor Insight,Poor Judgment  Present Suicidal Ideation: No  Present Homicidal Ideation: No    EDUCATION:   Learner Progress Toward Treatment Goals: reviewed group plans and strategies for care    Method:group therapy, medication compliance, individualized assessments and care planning    Outcome: needs reinforcement    PATIENT GOALS: to be discussed with patient within 72 hours    PLAN/TREATMENT RECOMMENDATIONS:     continue group therapy , medications compliance, goal setting, individualized assessments and care, continue to monitor pt on unit      SHORT-TERM GOALS:   Time frame for Short-Term Goals: 5-7 days    LONG-TERM GOALS:  Time frame for Long-Term Goals: 6 months  Members Present in Team Meeting: See Signature Sheet    Viola Quezada

## 2021-12-30 NOTE — PLAN OF CARE
Problem: OXYGENATION/RESPIRATORY FUNCTION  Goal: Patient will maintain patent airway  Outcome: Ongoing  Goal: Patient will achieve/maintain normal respiratory rate/effort  Description: Respiratory rate and effort will be within normal limits for the patient  Outcome: Ongoing     Problem: SKIN INTEGRITY  Goal: Skin integrity is maintained or improved  Outcome: Ongoing     Problem: Nutrition  Goal: Optimal nutrition therapy  Outcome: Ongoing     Problem: Skin Integrity:  Goal: Will show no infection signs and symptoms  Description: Will show no infection signs and symptoms  Outcome: Ongoing  Goal: Absence of new skin breakdown  Description: Absence of new skin breakdown  Outcome: Ongoing     Problem: Falls - Risk of:  Goal: Will remain free from falls  Description: Will remain free from falls  Outcome: Ongoing  Goal: Absence of physical injury  Description: Absence of physical injury  Outcome: Ongoing     Problem: Pain:  Goal: Pain level will decrease  Description: Pain level will decrease  Outcome: Ongoing  Goal: Control of acute pain  Description: Control of acute pain  Outcome: Ongoing  Goal: Control of chronic pain  Description: Control of chronic pain  Outcome: Ongoing     Problem: Confusion - Acute:  Goal: Absence of continued neurological deterioration signs and symptoms  Description: Absence of continued neurological deterioration signs and symptoms  Outcome: Ongoing  Goal: Mental status will be restored to baseline  Description: Mental status will be restored to baseline  Outcome: Ongoing     Problem: Discharge Planning:  Goal: Ability to perform activities of daily living will improve  Description: Ability to perform activities of daily living will improve  Outcome: Ongoing  Goal: Participates in care planning  Description: Participates in care planning  Outcome: Ongoing     Problem: Injury - Risk of, Physical Injury:  Goal: Will remain free from falls  Description: Will remain free from falls  Outcome: Ongoing  Goal: Absence of physical injury  Description: Absence of physical injury  Outcome: Ongoing     Problem: Mood - Altered:  Goal: Mood stable  Description: Mood stable  Outcome: Ongoing  Goal: Absence of abusive behavior  Description: Absence of abusive behavior  Outcome: Ongoing  Goal: Verbalizations of feeling emotionally comfortable while being cared for will increase  Description: Verbalizations of feeling emotionally comfortable while being cared for will increase  Outcome: Ongoing     Problem: Psychomotor Activity - Altered:  Goal: Absence of psychomotor disturbance signs and symptoms  Description: Absence of psychomotor disturbance signs and symptoms  Outcome: Ongoing     Problem: Sensory Perception - Impaired:  Goal: Demonstrations of improved sensory functioning will increase  Description: Demonstrations of improved sensory functioning will increase  Outcome: Ongoing  Goal: Decrease in sensory misperception frequency  Description: Decrease in sensory misperception frequency  Outcome: Ongoing  Goal: Able to refrain from responding to false sensory perceptions  Description: Able to refrain from responding to false sensory perceptions  Outcome: Ongoing  Goal: Demonstrates accurate environmental perceptions  Description: Demonstrates accurate environmental perceptions  Outcome: Ongoing  Goal: Able to distinguish between reality-based and nonreality-based thinking  Description: Able to distinguish between reality-based and nonreality-based thinking  Outcome: Ongoing  Goal: Able to interrupt nonreality-based thinking  Description: Able to interrupt nonreality-based thinking  Outcome: Ongoing     Problem: Sleep Pattern Disturbance:  Goal: Appears well-rested  Description: Appears well-rested  Outcome: Ongoing     Problem: Musculor/Skeletal Functional Status  Goal: Highest potential functional level  Outcome: Ongoing

## 2021-12-31 VITALS
RESPIRATION RATE: 14 BRPM | HEART RATE: 105 BPM | WEIGHT: 161 LBS | SYSTOLIC BLOOD PRESSURE: 118 MMHG | DIASTOLIC BLOOD PRESSURE: 71 MMHG | BODY MASS INDEX: 25.88 KG/M2 | HEIGHT: 66 IN | TEMPERATURE: 97.5 F

## 2021-12-31 PROCEDURE — 99239 HOSP IP/OBS DSCHRG MGMT >30: CPT | Performed by: PSYCHIATRY & NEUROLOGY

## 2021-12-31 PROCEDURE — 6370000000 HC RX 637 (ALT 250 FOR IP): Performed by: PSYCHIATRY & NEUROLOGY

## 2021-12-31 RX ORDER — BENZOCAINE/MENTHOL 6 MG-10 MG
LOZENGE MUCOUS MEMBRANE 2 TIMES DAILY
Qty: 1 EACH | Refills: 0 | Status: ON HOLD | OUTPATIENT
Start: 2021-12-31 | End: 2022-08-02 | Stop reason: ALTCHOICE

## 2021-12-31 RX ORDER — SERTRALINE HYDROCHLORIDE 25 MG/1
25 TABLET, FILM COATED ORAL DAILY
Qty: 30 TABLET | Refills: 3 | Status: ON HOLD | OUTPATIENT
Start: 2022-01-01 | End: 2022-08-02

## 2021-12-31 RX ORDER — ARIPIPRAZOLE 5 MG/1
5 TABLET ORAL DAILY
Qty: 30 TABLET | Refills: 3 | Status: ON HOLD | OUTPATIENT
Start: 2022-01-01 | End: 2022-08-02 | Stop reason: ALTCHOICE

## 2021-12-31 RX ORDER — PRAZOSIN HYDROCHLORIDE 1 MG/1
1 CAPSULE ORAL NIGHTLY
Qty: 30 CAPSULE | Refills: 3 | Status: ON HOLD | OUTPATIENT
Start: 2021-12-31 | End: 2022-08-02

## 2021-12-31 RX ADMIN — ARIPIPRAZOLE 5 MG: 5 TABLET ORAL at 08:45

## 2021-12-31 RX ADMIN — SERTRALINE HYDROCHLORIDE 25 MG: 25 TABLET ORAL at 08:45

## 2021-12-31 RX ADMIN — ACETAMINOPHEN 650 MG: 325 TABLET, FILM COATED ORAL at 08:47

## 2021-12-31 ASSESSMENT — PAIN DESCRIPTION - PAIN TYPE: TYPE: CHRONIC PAIN

## 2021-12-31 ASSESSMENT — PAIN SCALES - GENERAL
PAINLEVEL_OUTOF10: 0
PAINLEVEL_OUTOF10: 10
PAINLEVEL_OUTOF10: 3

## 2021-12-31 ASSESSMENT — PAIN DESCRIPTION - LOCATION: LOCATION: BACK

## 2021-12-31 NOTE — PLAN OF CARE
Problem: Altered Mood, Depressive Behavior:  Goal: Ability to disclose and discuss suicidal ideas will improve  Description: Ability to disclose and discuss suicidal ideas will improve  12/30/2021 2223 by Hazel Arroyo RN  Outcome: Ongoing  Patient denies suicidal ideation, homicidal ideation and hallucinations at this time. Safety plan reviewed with patient, agrees to approach staff when feeling upset. 15 minute and random checks maintained for safety. No violent or escalating behaviors noted during this shift. Patient is currently calm, controlled and medication-compliant. Patient is social with peers and staff. She is focused on discharge. She is pleasant, controlled and appropriate. Problem: Altered Mood, Depressive Behavior:  Goal: Absence of self-harm  Description: Absence of self-harm  12/30/2021 2223 by Hazel Arroyo RN  Outcome: Ongoing  Patient has made no attempt to harm self at this time. Problem: Tobacco Use:  Goal: Inpatient tobacco use cessation counseling participation  Description: Inpatient tobacco use cessation counseling participation  12/30/2021 2223 by Hazel Arroyo RN  Outcome: Ongoing  Discussed with patient the benefits to quitting smoking and potential dangers of tobacco.      Problem: Pain:  Goal: Pain level will decrease  Description: Pain level will decrease  12/30/2021 2223 by Hazel Arroyo RN  Outcome: Ongoing  Patient reports chronic back pain that is well-controlled with ibuprofen.
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
## 2021-12-31 NOTE — GROUP NOTE
Group Therapy Note    Date: 12/31/2021    Group Start Time: 0900  Group End Time: 0930  Group Topic: Group Documentation    IBETH QUESADA SUMEET Guzman        Group Therapy Note    Attendees: 4/20         Patient's Goal: Talk to doctor about d/c so she can be with her kids  Notes:  Goal setting/community meeting    Status After Intervention:  Improved    Participation Level:  Active Listener and Interactive    Participation Quality: Appropriate, Attentive, Sharing and Supportive      Speech:  normal      Thought Process/Content: Logical      Affective Functioning: Congruent      Mood: anxious      Level of consciousness:  Alert, Oriented x4 and Attentive      Response to Learning: Able to verbalize current knowledge/experience, Able to verbalize/acknowledge new learning, Able to retain information and Capable of insight      Endings: None Reported    Modes of Intervention: Education, Support, Socialization, Exploration and Problem-solving      Discipline Responsible: [address], [facility]      Signature:  Minh Guzman

## 2021-12-31 NOTE — BH NOTE
Patient given tobacco quitline number 26997043975 at this time, refusing to call at this time, states \" I just dont want to quit now\"- patient given information as to the dangers of long term tobacco use. Continue to reinforce the importance of tobacco cessation.

## 2021-12-31 NOTE — BH NOTE
[facility]  Discharge Note    Pt discharged with followings belongings:   Dental Appliances: None  Vision - Corrective Lenses: None  Hearing Aid: None  Jewelry: Other (Comment) (dermal piercing)  Clothing: Footwear,Pants,Shirt,Sweater,Socks,Undergarments (Comment)  Were All Patient Medications Collected?: Yes  Other Valuables: None   Patient discharged to her sister's house via her sister. Valuables sent home with patient. Patient education on aftercare instructions: yes, and voiced understanding. Information faxed to [facility] by   at 11:36 AM .Patient verbalize understanding of AVS:  Yes by read back method.  will call patient with her follow up appointment on Monday.   Status EXAM upon discharge:  Status and Exam  Normal: Yes  Facial Expression: Brightened  Affect: Appropriate  Level of Consciousness: Alert  Mood:Normal: Yes  Mood: Anxious  Motor Activity:Normal: Yes  Motor Activity: Decreased  Interview Behavior: Cooperative  Preception: [place] to Person,[place] to Time,[place] to Place,[place] to Situation  Attention:Normal: Yes  Attention: Hyperalert  Thought Processes: Circumstantial  Thought Content:Normal: Yes  Thought Content: Preoccupations  Hallucinations: None  Delusions: No  Memory:Normal: Yes  Memory: Poor Recent,Poor Remote  Insight and Judgment: No  Insight and Judgment: Poor Judgment  Present Suicidal Ideation: No  Present Homicidal Ideation: No      Metabolic Screening:    No results found for: LABA1C    No results found for: CHOL  No results found for: TRIG  No results found for: HDL  No components found for: LDLCAL  No results found for: Yecenia Espino LPN

## 2022-01-03 NOTE — CARE COORDINATION
Name: Yelena Vines    : 1985    Discharge Date: 21    Primary Auth/Cert #: [id number]    Destination:Patient discharged to her sister's house via her sister      will call patient with her follow up appointment on Monday    Discharge Medications:      Medication List      START taking these medications    ARIPiprazole 5 MG tablet  Commonly known as: ABILIFY  Take 1 tablet by mouth daily  Notes to patient: To clear thinking     hydrocortisone-aloe 1 % Crea cream  Apply topically 2 times daily  Replaces: hydrocortisone 1 % cream  Notes to patient: For skin condition     prazosin 1 MG capsule  Commonly known as: MINIPRESS  Take 1 capsule by mouth nightly  Notes to patient: Nightmares     sertraline 25 MG tablet  Commonly known as: ZOLOFT  Take 1 tablet by mouth daily  Notes to patient: Antidepressant        STOP taking these medications    acetaminophen 500 MG tablet  Commonly known as: TYLENOL     hydrocortisone 1 % cream  Replaced by: hydrocortisone-aloe 1 % Crea cream     ibuprofen 600 MG tablet  Commonly known as: ADVIL;MOTRIN     sennosides-docusate sodium 8.6-50 MG tablet  Commonly known as: SENOKOT-S     Wrist Splint/Cock-Up/Left M Misc           Where to Get Your Medications      These medications were sent to [facility]., [address] [facility] [phone]  [address] [place] [id number]    Phone: [phone]   · ARIPiprazole 5 MG tablet  · hydrocortisone-aloe 1 % Crea cream  · prazosin 1 MG capsule  · sertraline 25 MG tablet         Follow Up Appointment: [address]  [facility]  [address] [place] [id number] [phone]    Call in 3 days   will call you with your follow up appointment on monday

## 2022-01-16 ENCOUNTER — HOSPITAL ENCOUNTER (EMERGENCY)
Age: 37
Discharge: LEFT BEFORE DISCHARGE | End: 2022-01-16

## 2022-04-05 ENCOUNTER — APPOINTMENT (OUTPATIENT)
Dept: GENERAL RADIOLOGY | Age: 37
End: 2022-04-05
Payer: MEDICARE

## 2022-04-05 ENCOUNTER — HOSPITAL ENCOUNTER (EMERGENCY)
Age: 37
Discharge: HOME | End: 2022-04-05
Attending: EMERGENCY MEDICINE
Payer: MEDICARE

## 2022-04-05 VITALS
OXYGEN SATURATION: 100 % | SYSTOLIC BLOOD PRESSURE: 136 MMHG | HEART RATE: 75 BPM | DIASTOLIC BLOOD PRESSURE: 73 MMHG | RESPIRATION RATE: 18 BRPM | WEIGHT: 120 LBS | BODY MASS INDEX: 19.37 KG/M2 | TEMPERATURE: 97.8 F
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
## 2022-04-05 DIAGNOSIS — M25.511 ACUTE PAIN OF RIGHT SHOULDER: ICD-10-CM

## 2022-04-05 DIAGNOSIS — S93.402A SPRAIN OF LEFT ANKLE, UNSPECIFIED LIGAMENT, INITIAL ENCOUNTER: ICD-10-CM

## 2022-04-05 PROCEDURE — 73030 X-RAY EXAM OF SHOULDER: CPT

## 2022-04-05 PROCEDURE — 6370000000 HC RX 637 (ALT 250 FOR IP): Performed by: EMERGENCY MEDICINE

## 2022-04-05 PROCEDURE — 73630 X-RAY EXAM OF FOOT: CPT

## 2022-04-05 PROCEDURE — 99283 EMERGENCY DEPT VISIT LOW MDM: CPT

## 2022-04-05 PROCEDURE — 73610 X-RAY EXAM OF ANKLE: CPT

## 2022-04-05 RX ORDER — HYDROCODONE BITARTRATE AND ACETAMINOPHEN 5; 325 MG/1; MG/1
1 TABLET ORAL EVERY 4 HOURS PRN
Qty: 10 TABLET | Refills: 0 | Status: SHIPPED | OUTPATIENT
Start: 2022-04-05 | End: 2022-04-12

## 2022-04-05 RX ORDER — IBUPROFEN 800 MG/1
800 TABLET ORAL ONCE
Status: COMPLETED | OUTPATIENT
Start: 2022-04-05 | End: 2022-04-05

## 2022-04-05 RX ADMIN — IBUPROFEN 800 MG: 800 TABLET, FILM COATED ORAL at 20:18

## 2022-04-05 ASSESSMENT — PAIN SCALES - GENERAL
PAINLEVEL_OUTOF10: 10

## 2022-04-05 ASSESSMENT — ENCOUNTER SYMPTOMS
ABDOMINAL DISTENTION: 0
VOMITING: 0
SHORTNESS OF BREATH: 0
EYES NEGATIVE: 1
APNEA: 0
CHEST TIGHTNESS: 0
DIARRHEA: 0
CONSTIPATION: 0
COLOR CHANGE: 0
SINUS PAIN: 0

## 2022-04-05 ASSESSMENT — PAIN DESCRIPTION - DESCRIPTORS: DESCRIPTORS: CONSTANT;DISCOMFORT

## 2022-04-05 ASSESSMENT — PAIN - FUNCTIONAL ASSESSMENT: PAIN_FUNCTIONAL_ASSESSMENT: 0-10

## 2022-04-05 ASSESSMENT — PAIN DESCRIPTION - ORIENTATION: ORIENTATION: RIGHT;LEFT

## 2022-04-05 ASSESSMENT — PAIN DESCRIPTION - LOCATION: LOCATION: ANKLE;SHOULDER

## 2022-04-05 ASSESSMENT — PAIN DESCRIPTION - PAIN TYPE: TYPE: ACUTE PAIN

## 2022-04-05 ASSESSMENT — PAIN DESCRIPTION - FREQUENCY: FREQUENCY: CONTINUOUS

## 2022-04-06 NOTE — ED NOTES
Pt educated on use of crutches. Boot applied to left foot. Sling applied to right arm.       Sean Dance, ANDRIY  04/05/22 2692

## 2022-04-06 NOTE — ED NOTES
Pt presents to ED via private auto with c/o ankle and shoulder pain from fall yesterday. Pt denies LOC. Pt able to ambulate to ED cart without assist. Pt alert and oriented x4. Pt afebrile, vitals stable.       Irwin Mathias RN  04/05/22 2020

## 2022-04-06 NOTE — ED PROVIDER NOTES
EMERGENCY DEPARTMENT ENCOUNTER    Pt Name: Iman Ocasio  MRN: [id number]  [place] 1985  Date of evaluation: 4/5/22  CHIEF COMPLAINT       Chief Complaint   Patient presents with    Ankle Pain     left ankle/ pain and swelling to toes on left foot    Fall     last night    Shoulder Injury     right shoulder     HISTORY OF PRESENT ILLNESS   28-year-old female presents the emergency room for right shoulder left ankle and foot pain after a fall. Patient states she slipped up her steps yesterday evening. She reports a lot of pain in her left ankle and the toes. She did injure her shoulder and dislocated it. She reports that she has recurrent dislocations of the shoulder and was told at one point in time she may need a total shoulder replacement. She did bump her eye and her head but did not lose consciousness. She does not have significant headache. REVIEW OF SYSTEMS     Review of Systems   Constitutional: Negative for activity change, chills and diaphoresis. HENT: Negative for congestion, sinus pain and tinnitus. Eyes: Negative. Respiratory: Negative for apnea, chest tightness and shortness of breath. Gastrointestinal: Negative for abdominal distention, constipation, diarrhea and vomiting. Genitourinary: Negative for difficulty urinating and frequency. Musculoskeletal: Negative for arthralgias and myalgias. Skin: Negative for color change and rash. Neurological: Negative for dizziness. Hematological: Negative. Psychiatric/Behavioral: Negative.         PASTMEDICAL HISTORY     Past Medical History:   Diagnosis Date    ADHD     Allergy     dilaudid    Aspiration pneumonia ([address] 12/27/2021    Bipolar 1 disorder ([address]     Bipolar affective disorder, depressed, severe ([address] 12/30/2021    Bipolar affective disorder, depressed, severe ([address] 12/30/2021    Bronchitis, chronic (HCC)     Cocaine abuse with intoxication with complication ([address] 4/4/7121    Depression with suicidal ideation 12/29/2021    Osteoarthritis     Osteoarthritis     Rh negative state in antepartum period 5/8/2012    Schizophrenia ([facility]     Seizures ([facility] 7/5/2016    Spinal stenosis      Past Problem List  Patient Active Problem List   Diagnosis Code    Current smoker F17.200    Schizophrenia ([facility] F20.9    Spinal stenosis M48.00    Convulsions ([facility] R56.9    Cocaine abuse in remission ([facility] F14.11    Marijuana use F12.90    Family history of diabetes mellitus Z83.3    Encounter to establish care with new doctor Z76.89    Chronic pain syndrome G89.4    Fatigue R53.83    History of bilateral tubal ligation Z98.51    Drug overdose, intentional self-harm, initial encounter ([facility] T50.902A    Aspiration pneumonia ([facility] J69.0    Hypokalemia E87.6    Drug overdose T50.901A    Depression with suicidal ideation F32. A, R45.851    Bipolar affective disorder, depressed, severe ([facility] F31.4     SURGICAL HISTORY       Past Surgical History:   Procedure Laterality Date    CHOLECYSTECTOMY      DILATION AND CURETTAGE      2 x 2011 post partum hemorrhage & SAB    DILATION AND CURETTAGE OF UTERUS      DILATION AND CURETTAGE OF UTERUS      x3    HERNIA REPAIR      HERNIA REPAIR  2011    HERNIA REPAIR      x2    MANDIBLE FRACTURE SURGERY      MANDIBLE SURGERY      UPPER GASTROINTESTINAL ENDOSCOPY       CURRENT MEDICATIONS       Previous Medications    ARIPIPRAZOLE (ABILIFY) 5 MG TABLET    Take 1 tablet by mouth daily    HYDROCORTISONE-ALOE 1 % CREA CREAM    Apply topically 2 times daily    PRAZOSIN (MINIPRESS) 1 MG CAPSULE    Take 1 capsule by mouth nightly    SERTRALINE (ZOLOFT) 25 MG TABLET    Take 1 tablet by mouth daily     ALLERGIES     is allergic to dilaudid [hydromorphone hcl]. FAMILY HISTORY     She indicated that the status of her mother is unknown. She indicated that the status of her maternal grandmother is unknown. She indicated that the status of her maternal grandfather is unknown. She indicated that the status of her neg hx is unknown. SOCIAL HISTORY       Social History     Tobacco Use    Smoking status: Current Every Day Smoker     Packs/day: 0.50     Years: 11.00     Pack years: 5.50     Types: Cigarettes     Start date:     Smokeless tobacco: Never Used   Substance Use Topics    Alcohol use: No     Comment: on occ    Drug use: No     PHYSICAL EXAM     INITIAL VITALS: /73   Pulse 75   Temp 97.8 °F (36.6 °C) (Oral)   Resp 18   Wt 120 lb (54.4 kg)   LMP 2022 (Approximate)   SpO2 100%   BMI 19.37 kg/m²    Physical Exam  Constitutional:       General: She is not in acute distress. Appearance: She is well-developed. HENT:      Head: Normocephalic. Eyes:      Pupils: Pupils are equal, round, and reactive to light. Cardiovascular:      Rate and Rhythm: Normal rate and regular rhythm. Heart sounds: Normal heart sounds. Pulmonary:      Effort: Pulmonary effort is normal. No respiratory distress. Breath sounds: Normal breath sounds. Abdominal:      General: Bowel sounds are normal.      Palpations: Abdomen is soft. Tenderness: There is no abdominal tenderness. Musculoskeletal:      Right shoulder: No swelling or deformity. Decreased range of motion. Arms:         Legs:    Skin:     General: Skin is warm and dry. Neurological:      Mental Status: She is alert and oriented to person, place, and time. MEDICAL DECISION MAKIN-year-old female presenting to the emergency room 1 day after a fall with pain to the left ankle foot toes and right shoulder. Patient did have minor head injury but does not meet criteria for CT imaging of the head. X-rays of the shoulder ankle and feet are unremarkable. Patient given pain medications for home. She was placed in postop shoe for comfort per her request.  Patient encouraged to follow-up with primary care provider. I have low suspicion for malignant or emergent process.  I updated patient results here in the ED. Patient is aware that ED testing may be falsely reassuring and patient understands if symptoms worsen reassessment may be necessary. CRITICAL CARE:       PROCEDURES:    Procedures    DIAGNOSTIC RESULTS   EKG:All EKG's are interpreted by the Emergency Department Physician who either signs or Co-signs this chart in the absence of a cardiologist.        RADIOLOGY:All plain film, CT, MRI, and formal ultrasound images (except ED bedside ultrasound) are read by the radiologist, see reports below, unless otherwisenoted in MDM or here. XR SHOULDER RIGHT (MIN 2 VIEWS)   Final Result   No acute abnormality. XR ANKLE LEFT (MIN 3 VIEWS)   Final Result   No acute bony abnormalities are noted         XR FOOT LEFT (MIN 3 VIEWS)   Final Result   No acute bony abnormalities are noted           LABS: All lab results were reviewed by myself, and all abnormals are listed below. Labs Reviewed - No data to display    EMERGENCY DEPARTMENTCOURSE:         Vitals:    Vitals:    04/05/22 1953   BP: 136/73   Pulse: 75   Resp: 18   Temp: 97.8 °F (36.6 °C)   TempSrc: Oral   SpO2: 100%   Weight: 120 lb (54.4 kg)       The patient was given the following medications while in the emergency department:  Orders Placed This Encounter   Medications    ibuprofen (ADVIL;MOTRIN) tablet 800 mg    HYDROcodone-acetaminophen (NORCO) 5-325 MG per tablet     Sig: Take 1 tablet by mouth every 4 hours as needed for Pain for up to 7 days. Intended supply: 3 days. Take lowest dose possible to manage pain     Dispense:  10 tablet     Refill:  0     CONSULTS:  None    FINAL IMPRESSION      1. Fall, initial encounter    2. Sprain of left ankle, unspecified ligament, initial encounter    3.  Acute pain of right shoulder          DISPOSITION/PLAN   DISPOSITION Decision To Discharge 04/05/2022 09:28:31 PM      PATIENT REFERRED TO:  Lavell Villatoro MD  [address] [place] [address]  [phone]    Schedule an appointment as soon as possible for a visit   As needed    DISCHARGE MEDICATIONS:  New Prescriptions    HYDROCODONE-ACETAMINOPHEN (NORCO) 5-325 MG PER TABLET    Take 1 tablet by mouth every 4 hours as needed for Pain for up to 7 days. Intended supply: 3 days. Take lowest dose possible to manage pain     Babita Eagle MD  Attending Emergency Physician      Care during this encounter was due to an unprecedented national emergency due to COVID-19.             Jemima Morataya MD  04/05/22 [id number]

## 2022-04-09 ENCOUNTER — APPOINTMENT (OUTPATIENT)
Dept: GENERAL RADIOLOGY | Age: 37
End: 2022-04-09
Payer: MEDICARE

## 2022-04-09 ENCOUNTER — HOSPITAL ENCOUNTER (EMERGENCY)
Age: 37
Discharge: HOME | End: 2022-04-09
Attending: EMERGENCY MEDICINE
Payer: MEDICARE

## 2022-04-09 ENCOUNTER — SOCIAL WORK (OUTPATIENT)
Dept: EMERGENCY DEPT | Age: 37
End: 2022-04-09

## 2022-04-09 VITALS
TEMPERATURE: 97.6 F | OXYGEN SATURATION: 99 % | HEART RATE: 89 BPM | SYSTOLIC BLOOD PRESSURE: 120 MMHG | RESPIRATION RATE: 18 BRPM | DIASTOLIC BLOOD PRESSURE: 80 MMHG
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
## 2022-04-09 PROCEDURE — 6370000000 HC RX 637 (ALT 250 FOR IP): Performed by: STUDENT IN AN ORGANIZED HEALTH CARE EDUCATION/TRAINING PROGRAM

## 2022-04-09 PROCEDURE — 99282 EMERGENCY DEPT VISIT SF MDM: CPT

## 2022-04-09 PROCEDURE — 73090 X-RAY EXAM OF FOREARM: CPT

## 2022-04-09 PROCEDURE — 73030 X-RAY EXAM OF SHOULDER: CPT

## 2022-04-09 RX ORDER — ACETAMINOPHEN 325 MG/1
650 TABLET ORAL EVERY 6 HOURS PRN
Qty: 60 TABLET | Refills: 0 | Status: ON HOLD | OUTPATIENT
Start: 2022-04-09 | End: 2022-08-02 | Stop reason: ALTCHOICE

## 2022-04-09 RX ORDER — IBUPROFEN 800 MG/1
800 TABLET ORAL ONCE
Status: COMPLETED | OUTPATIENT
Start: 2022-04-09 | End: 2022-04-09

## 2022-04-09 RX ORDER — IBUPROFEN 200 MG
400 TABLET ORAL EVERY 6 HOURS PRN
Qty: 60 TABLET | Refills: 0 | Status: ON HOLD | OUTPATIENT
Start: 2022-04-09 | End: 2022-08-02 | Stop reason: ALTCHOICE

## 2022-04-09 RX ADMIN — IBUPROFEN 800 MG: 800 TABLET, FILM COATED ORAL at 17:30

## 2022-04-09 ASSESSMENT — ENCOUNTER SYMPTOMS
NAUSEA: 0
SORE THROAT: 0
COLOR CHANGE: 1
CONSTIPATION: 0
ABDOMINAL PAIN: 0
FACIAL SWELLING: 0
SHORTNESS OF BREATH: 0
VOMITING: 0
COUGH: 0
DIARRHEA: 0
RHINORRHEA: 0
BLOOD IN STOOL: 0

## 2022-04-09 ASSESSMENT — PAIN SCALES - GENERAL: PAINLEVEL_OUTOF10: 10
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
## 2022-04-09 NOTE — ED PROVIDER NOTES
[facility]     Emergency Department     Faculty Attestation    I performed a history and physical examination of the patient and discussed management with the resident. I reviewed the resident´s note and agree with the documented findings and plan of care. Any areas of disagreement are noted on the chart. I was personally present for the key portions of any procedures. I have documented in the chart those procedures where I was not present during the key portions. I have reviewed the emergency nurses triage note. I agree with the chief complaint, past medical history, past surgical history, allergies, medications, social and family history as documented unless otherwise noted below. For Physician Assistant/ Nurse Practitioner cases/documentation I have personally evaluated this patient and have completed at least one if not all key elements of the E/M (history, physical exam, and MDM). Additional findings are as noted. Awake alert and oriented, not clinically intoxicated, skin warm and dry, no ataxia or nystagmus, no muscle rigidity, cranial nerves intact, cerebellar testing normal, good airway, no meningeal signs. Good airway, no midline spine pain, equal breath sounds, heart tones normal, abdomen soft nontender, pelvis stable and nontender. Pain left forearm with bruising and swelling.        Prabhu Reis MD  04/09/22 3506
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
## 2022-04-10 NOTE — ED NOTES
[employer] at bedside.      [address] [facility], [address] [place] [address]  04/09/22 1424

## 2022-05-19 ENCOUNTER — HOSPITAL ENCOUNTER (EMERGENCY)
Age: 37
Discharge: HOME | End: 2022-05-19
Attending: EMERGENCY MEDICINE
Payer: MEDICARE

## 2022-05-19 VITALS
RESPIRATION RATE: 22 BRPM | BODY MASS INDEX: 23.4 KG/M2 | WEIGHT: 145 LBS | SYSTOLIC BLOOD PRESSURE: 127 MMHG | TEMPERATURE: 97.4 F | HEART RATE: 91 BPM | OXYGEN SATURATION: 100 % | DIASTOLIC BLOOD PRESSURE: 93 MMHG

## 2022-05-19 DIAGNOSIS — M54.50 ACUTE EXACERBATION OF CHRONIC LOW BACK PAIN: Primary | ICD-10-CM

## 2022-05-19 DIAGNOSIS — G89.29 ACUTE EXACERBATION OF CHRONIC LOW BACK PAIN: Primary | ICD-10-CM

## 2022-05-19 PROCEDURE — 6360000002 HC RX W HCPCS: Performed by: PHYSICIAN ASSISTANT

## 2022-05-19 PROCEDURE — 99284 EMERGENCY DEPT VISIT MOD MDM: CPT

## 2022-05-19 PROCEDURE — 96372 THER/PROPH/DIAG INJ SC/IM: CPT

## 2022-05-19 RX ORDER — KETOROLAC TROMETHAMINE 30 MG/ML
60 INJECTION, SOLUTION INTRAMUSCULAR; INTRAVENOUS ONCE
Status: COMPLETED | OUTPATIENT
Start: 2022-05-19 | End: 2022-05-19

## 2022-05-19 RX ORDER — METHOCARBAMOL 750 MG/1
750 TABLET, FILM COATED ORAL 4 TIMES DAILY
Qty: 40 TABLET | Refills: 0 | Status: SHIPPED | OUTPATIENT
Start: 2022-05-19 | End: 2022-05-29

## 2022-05-19 RX ORDER — ORPHENADRINE CITRATE 30 MG/ML
60 INJECTION INTRAMUSCULAR; INTRAVENOUS ONCE
Status: COMPLETED | OUTPATIENT
Start: 2022-05-19 | End: 2022-05-19

## 2022-05-19 RX ORDER — ETODOLAC 500 MG/1
500 TABLET, FILM COATED ORAL 2 TIMES DAILY
Qty: 20 TABLET | Refills: 0 | Status: ON HOLD | OUTPATIENT
Start: 2022-05-19 | End: 2022-08-02 | Stop reason: ALTCHOICE

## 2022-05-19 RX ADMIN — ORPHENADRINE CITRATE 60 MG: 30 INJECTION INTRAMUSCULAR; INTRAVENOUS at 19:45

## 2022-05-19 RX ADMIN — KETOROLAC TROMETHAMINE 60 MG: 30 INJECTION, SOLUTION INTRAMUSCULAR at 19:45

## 2022-05-19 ASSESSMENT — PAIN - FUNCTIONAL ASSESSMENT: PAIN_FUNCTIONAL_ASSESSMENT: 0-10

## 2022-05-19 ASSESSMENT — PAIN SCALES - GENERAL: PAINLEVEL_OUTOF10: 10

## 2022-05-19 NOTE — ED PROVIDER NOTES
[address] [place] [address] ED  [facility]      Pt Name: Vivek Valencia  MRN: [id number]  [place] 1985  Date ofevaluation: 5/19/2022  Provider: Latonia Beckett PA-C    CHIEF COMPLAINT       Chief Complaint   Patient presents with    Back Pain         HISTORY OF PRESENT ILLNESS  (Location/Symptom, Timing/Onset, Context/Setting, Quality, Duration, Modifying Factors, Severity.)   Vivek Valencia is a 40 y.o. female who presents to the emergency department with back pain. Patient reports chronic back pain. Denies any fevers or chills. Nausea vomiting. Pain has been worse over the last couple days. Patient reports working as a . Pain is worse with movement and relieved with rest.      Nursing Notes were reviewed.     ALLERGIES     Dilaudid [hydromorphone hcl]    CURRENT MEDICATIONS       Previous Medications    ACETAMINOPHEN (TYLENOL) 325 MG TABLET    Take 2 tablets by mouth every 6 hours as needed for Pain    ARIPIPRAZOLE (ABILIFY) 5 MG TABLET    Take 1 tablet by mouth daily    HYDROCORTISONE-ALOE 1 % CREA CREAM    Apply topically 2 times daily    IBUPROFEN (ADVIL;MOTRIN) 200 MG TABLET    Take 2 tablets by mouth every 6 hours as needed for Pain or Fever    PRAZOSIN (MINIPRESS) 1 MG CAPSULE    Take 1 capsule by mouth nightly    SERTRALINE (ZOLOFT) 25 MG TABLET    Take 1 tablet by mouth daily       PAST MEDICAL HISTORY         Diagnosis Date    ADHD     Allergy     dilaudid    Aspiration pneumonia ([address] 12/27/2021    Bipolar 1 disorder ([address]     Bipolar affective disorder, depressed, severe ([address] 12/30/2021    Bipolar affective disorder, depressed, severe ([address] 12/30/2021    Bronchitis, chronic ([address]     Cocaine abuse with intoxication with complication ([address] 0/8/7237    Depression with suicidal ideation 12/29/2021    Osteoarthritis     Osteoarthritis     Rh negative state in antepartum period 5/8/2012    Schizophrenia ([address]     Seizures ([address] 7/5/2016    Palpations: Abdomen is soft. Tenderness: There is no abdominal tenderness. Musculoskeletal:         General: Normal range of motion. Cervical back: Normal range of motion and neck supple. Back:    Skin:     General: Skin is warm. Findings: No rash. Neurological:      Mental Status: She is alert and oriented to person, place, and time. Psychiatric:         Behavior: Behavior normal.                 DIAGNOSTIC RESULTS     EKG: All EKG's are interpreted by the Emergency Department Physician who either signs or Co-signs this chart in the absence of a cardiologist.        RADIOLOGY:   Non-plain film images such as CT, Ultrasound and MRI are read by the radiologist. Plain radiographic images arevisualized and preliminarily interpreted by the emergency physician with the below findings:        Interpretation per the Radiologist below, if available at thetime of this note:          ED BEDSIDE ULTRASOUND:   Performed by ED Physician - none    LABS:  Labs Reviewed - No data to display    All other labs were within normal range or not returned as of this dictation. EMERGENCY DEPARTMENT COURSE and DIFFERENTIAL DIAGNOSIS/MDM:   Vitals:    Vitals:    05/19/22 1848   BP: (!) 127/93   Pulse: 91   Resp: 22   Temp: 97.4 °F (36.3 °C)   TempSrc: Oral   SpO2: 100%   Weight: 145 lb (65.8 kg)     X-rays discussed and offered but patient declined. This is reasonable given patient has no recent trauma. Patient will be discharged home. Elevated symptomatically. CONSULTS:  None    PROCEDURES:  Procedures        FINAL IMPRESSION      1. Acute exacerbation of chronic low back pain          DISPOSITION/PLAN   DISPOSITION Decision To Discharge 05/19/2022 07:39:42 PM      PATIENTREFERRED TO:   No follow-up provider specified.     DISCHARGE MEDICATIONS:     New Prescriptions    ETODOLAC (LODINE) 500 MG TABLET    Take 1 tablet by mouth 2 times daily for 10 days    METHOCARBAMOL (ROBAXIN-750) 750 MG TABLET    Take 1 tablet by mouth 4 times daily for 10 days           (Please note that portions of this note were completed with a voice recognition program.  Efforts were made to edit thedictations but occasionally words are mis-transcribed.)    MELLY Oliver PA-C  05/19/22 2003

## 2022-06-07 ENCOUNTER — APPOINTMENT (OUTPATIENT)
Dept: GENERAL RADIOLOGY | Age: 37
End: 2022-06-07
Payer: MEDICARE

## 2022-06-07 ENCOUNTER — HOSPITAL ENCOUNTER (EMERGENCY)
Age: 37
Discharge: HOME | End: 2022-06-07
Attending: EMERGENCY MEDICINE
Payer: MEDICARE

## 2022-06-07 VITALS
WEIGHT: 145 LBS | HEIGHT: 66 IN | DIASTOLIC BLOOD PRESSURE: 79 MMHG | OXYGEN SATURATION: 100 % | TEMPERATURE: 97.7 F | SYSTOLIC BLOOD PRESSURE: 107 MMHG | RESPIRATION RATE: 14 BRPM | BODY MASS INDEX: 23.3 KG/M2 | HEART RATE: 92 BPM

## 2022-06-07 DIAGNOSIS — R07.81 RIB PAIN ON LEFT SIDE: Primary | ICD-10-CM

## 2022-06-07 DIAGNOSIS — J06.9 VIRAL URI WITH COUGH: ICD-10-CM

## 2022-06-07 LAB
SARS-COV-2, RAPID: NOT DETECTED
SPECIMEN DESCRIPTION: NORMAL

## 2022-06-07 PROCEDURE — 87635 SARS-COV-2 COVID-19 AMP PRB: CPT

## 2022-06-07 PROCEDURE — 93005 ELECTROCARDIOGRAM TRACING: CPT | Performed by: EMERGENCY MEDICINE

## 2022-06-07 PROCEDURE — 99285 EMERGENCY DEPT VISIT HI MDM: CPT

## 2022-06-07 PROCEDURE — 71045 X-RAY EXAM CHEST 1 VIEW: CPT

## 2022-06-07 PROCEDURE — 6370000000 HC RX 637 (ALT 250 FOR IP): Performed by: NURSE PRACTITIONER

## 2022-06-07 RX ORDER — METHOCARBAMOL 750 MG/1
750 TABLET, FILM COATED ORAL 3 TIMES DAILY
Qty: 9 TABLET | Refills: 0 | Status: ON HOLD | OUTPATIENT
Start: 2022-06-07 | End: 2022-08-02 | Stop reason: ALTCHOICE

## 2022-06-07 RX ORDER — BENZONATATE 100 MG/1
100-200 CAPSULE ORAL 3 TIMES DAILY PRN
Qty: 40 CAPSULE | Refills: 0 | Status: SHIPPED | OUTPATIENT
Start: 2022-06-07 | End: 2022-06-14

## 2022-06-07 RX ORDER — IBUPROFEN 800 MG/1
800 TABLET ORAL EVERY 8 HOURS PRN
Qty: 15 TABLET | Refills: 0 | Status: ON HOLD | OUTPATIENT
Start: 2022-06-07 | End: 2022-08-02 | Stop reason: ALTCHOICE

## 2022-06-07 RX ORDER — PREDNISONE 20 MG/1
50 TABLET ORAL ONCE
Status: COMPLETED | OUTPATIENT
Start: 2022-06-07 | End: 2022-06-07

## 2022-06-07 RX ORDER — PREDNISONE 10 MG/1
TABLET ORAL
Qty: 30 TABLET | Refills: 0 | Status: ON HOLD | OUTPATIENT
Start: 2022-06-07 | End: 2022-08-02 | Stop reason: ALTCHOICE

## 2022-06-07 RX ORDER — ALBUTEROL SULFATE 90 UG/1
2 AEROSOL, METERED RESPIRATORY (INHALATION) EVERY 6 HOURS PRN
Qty: 18 G | Refills: 0 | Status: ON HOLD | OUTPATIENT
Start: 2022-06-07 | End: 2022-08-04 | Stop reason: HOSPADM

## 2022-06-07 RX ADMIN — PREDNISONE 50 MG: 20 TABLET ORAL at 19:08

## 2022-06-07 ASSESSMENT — ENCOUNTER SYMPTOMS
VOMITING: 0
COLOR CHANGE: 0
SHORTNESS OF BREATH: 0
NAUSEA: 0
ABDOMINAL PAIN: 0
DIARRHEA: 0
SINUS PRESSURE: 0
COUGH: 1
BACK PAIN: 0
RHINORRHEA: 1
SORE THROAT: 0

## 2022-06-07 ASSESSMENT — PAIN SCALES - GENERAL: PAINLEVEL_OUTOF10: 10

## 2022-06-07 ASSESSMENT — PAIN - FUNCTIONAL ASSESSMENT: PAIN_FUNCTIONAL_ASSESSMENT: 0-10

## 2022-06-07 NOTE — ED NOTES
Pt presenting to the ED with complaints of right rib pain. Pt reports the pain started two days ago when she developed a cough. Pt states the pain is worse when coughing and bending. Pt also requesting refills on inhalers, a nebulizer machine, and refills for nebulizer.       Lev Manjarrez RN  06/07/22 3415

## 2022-06-07 NOTE — ED PROVIDER NOTES
[address] ED  eMERGENCY dEPARTMENT eNCOUnter      Pt Name: Liza Foss  MRN: [id number]  [place] 1985  Date of evaluation: 6/7/2022  Provider: ADELINA Ch [id number]       Chief Complaint   Patient presents with    Rib Pain     onset three days ago. Rt side. worse when coughing         HISTORY OF PRESENT ILLNESS  (Location/Symptom, Timing/Onset, Context/Setting, Quality, Duration, Modifying Factors, Severity.)   Liza Fsos is a 40 y.o. female who presents to the emergency department via private auto for pain to her left rib area. Onset was today. She developed a cough, fatigue 3 days ago. Denies fever, chills, SOB, diarrhea. Her discomfort is worse with coughing, inspiration. Rates her pain 10/10 at this time. Denies chance of pregnancy. She is a smoker. She is requesting prescriptions for an inhaler, something for pain, a muscle relaxer, and something for her cough. Nursing Notes were reviewed.     ALLERGIES     Dilaudid [hydromorphone hcl]    CURRENT MEDICATIONS       Previous Medications    ACETAMINOPHEN (TYLENOL) 325 MG TABLET    Take 2 tablets by mouth every 6 hours as needed for Pain    ARIPIPRAZOLE (ABILIFY) 5 MG TABLET    Take 1 tablet by mouth daily    ETODOLAC (LODINE) 500 MG TABLET    Take 1 tablet by mouth 2 times daily for 10 days    HYDROCORTISONE-ALOE 1 % CREA CREAM    Apply topically 2 times daily    IBUPROFEN (ADVIL;MOTRIN) 200 MG TABLET    Take 2 tablets by mouth every 6 hours as needed for Pain or Fever    PRAZOSIN (MINIPRESS) 1 MG CAPSULE    Take 1 capsule by mouth nightly    SERTRALINE (ZOLOFT) 25 MG TABLET    Take 1 tablet by mouth daily       PAST MEDICAL HISTORY         Diagnosis Date    ADHD     Allergy     dilaudid    Aspiration pneumonia ([facility] 12/27/2021    Bipolar 1 disorder ([address]     Bipolar affective disorder, depressed, severe ([address] 12/30/2021    Bipolar affective disorder, depressed, severe ([address] 12/30/2021    [facility]chitis, chronic [facility]     Cocaine abuse with intoxication with complication ([facility] 5/3/6663    Depression with suicidal ideation 2021    Osteoarthritis     Osteoarthritis     Rh negative state in antepartum period 2012    Schizophrenia ([facility]     Seizures [facility] 2016    Spinal stenosis        SURGICAL HISTORY           Procedure Laterality Date    CHOLECYSTECTOMY      DILATION AND CURETTAGE      2 x 2011 post partum hemorrhage & SAB    DILATION AND CURETTAGE OF UTERUS      DILATION AND CURETTAGE OF UTERUS      x3    HERNIA REPAIR      HERNIA REPAIR  2011    HERNIA REPAIR      x2    MANDIBLE FRACTURE SURGERY      MANDIBLE SURGERY      UPPER GASTROINTESTINAL ENDOSCOPY           FAMILY HISTORY           Problem Relation Age of Onset    Cancer Maternal Grandmother     Diabetes Maternal Grandmother     Diabetes Maternal Grandfather     Hypertension Mother     Breast Cancer Neg Hx     Colon Cancer Neg Hx     Eclampsia Neg Hx     Ovarian Cancer Neg Hx      Labor Neg Hx     Spont Abortions Neg Hx     Stroke Neg Hx      Family Status   Relation Name Status    MGM  (Not Specified)    MGF  (Not Specified)    Mother  (Not Specified)    Neg Hx  (Not Specified)        SOCIAL HISTORY      reports that she has been smoking cigarettes. She started smoking about 22 years ago. She has a 5.50 pack-year smoking history. She has never used smokeless tobacco. She reports that she does not drink alcohol and does not use drugs. REVIEW OF SYSTEMS    (2-9 systems for level 4, 10 or more for level 5)     Review of Systems   Constitutional: Positive for fatigue. Negative for chills, diaphoresis and fever. HENT: Positive for congestion and rhinorrhea. Negative for ear discharge, ear pain, postnasal drip, sinus pressure and sore throat. Respiratory: Positive for cough. Negative for shortness of breath. Cardiovascular: Negative for chest pain.    Gastrointestinal: Negative for abdominal pain, diarrhea, nausea and vomiting. Genitourinary: Negative for dysuria. Musculoskeletal: Positive for arthralgias and myalgias. Negative for back pain, neck pain and neck stiffness. Skin: Negative for color change and rash. Neurological: Negative for dizziness, weakness, light-headedness and headaches. Except as noted above the remainder of the review of systems was reviewed and negative. PHYSICAL EXAM    (up to 7 for level 4, 8 or more for level 5)     ED Triage Vitals [06/07/22 1834]   BP Temp Temp Source Heart Rate Resp SpO2 Height Weight   107/79 97.7 °F (36.5 °C) Oral 92 14 100 % 5' 6\" (1.676 m) 145 lb (65.8 kg)     Physical Exam  Vitals reviewed. Constitutional:       General: She is not in acute distress. Appearance: She is well-developed. She is not diaphoretic. HENT:      Right Ear: External ear normal.      Left Ear: External ear normal.      Nose: Congestion present. Eyes:      General: No scleral icterus. Conjunctiva/sclera: Conjunctivae normal.   Cardiovascular:      Rate and Rhythm: Normal rate and regular rhythm. Pulmonary:      Effort: Pulmonary effort is normal. No respiratory distress. Breath sounds: No stridor. Decreased breath sounds present. No wheezing or rales. Chest:      Chest wall: Tenderness (left rib area) present. Abdominal:      General: There is no distension. Palpations: Abdomen is soft. Tenderness: There is no abdominal tenderness. There is no guarding. Musculoskeletal:      Comments: Moves extremities. Skin:     General: Skin is warm and dry. Findings: No rash. Neurological:      Mental Status: She is alert and oriented to person, place, and time.    Psychiatric:         Behavior: Behavior normal.           DIAGNOSTIC RESULTS     RADIOLOGY:   Non-plain film images such as CT, Ultrasound and MRI are read by the radiologist. Plain radiographic images are visualized and preliminarily interpreted by the emergency physician with the below findings:    Interpretation per the Radiologist below, if available at the time of this note:    XR CHEST PORTABLE    Result Date: 6/7/2022  EXAMINATION: [address] [place] [address] 6/7/2022 6:58 pm COMPARISON: December 24, 2021 HISTORY: ORDERING SYSTEM PROVIDED HISTORY: cough TECHNOLOGIST PROVIDED HISTORY: cough Reason for Exam: cough, rib pain, concern for pneumonia FINDINGS: The lungs are without acute focal process. There is no effusion or pneumothorax. The cardiomediastinal silhouette is without acute process. The osseous structures are without acute process. No acute process. LABS:  Labs Reviewed   COVID-19, RAPID       All other labs were within normal range or not returned as of this dictation. EMERGENCY DEPARTMENT COURSE and DIFFERENTIAL DIAGNOSIS/MDM:   Vitals:    Vitals:    06/07/22 1834   BP: 107/79   Pulse: 92   Resp: 14   Temp: 97.7 °F (36.5 °C)   TempSrc: Oral   SpO2: 100%   Weight: 145 lb (65.8 kg)   Height: 5' 6\" (1.676 m)         MEDICATIONS GIVEN IN THE ED:  Medications   predniSONE (DELTASONE) tablet 50 mg (50 mg Oral Given 6/7/22 1908)       CLINICAL DECISION MAKING:  The patient presented alert with a nontoxic appearance and was seen in conjunction with Dr. Gregoria Horne. Covid-19 test was negative. Chest x-ray was negative for acute findings. Prescriptions were written for prednisone, robaxin, motrin, tessalon perles, and Proventil. Follow up with pcp for a recheck, further evaluation and treatment. .The patient was advised to not drink alcohol, drive, or operate heavy machinery while taking the robaxin. Evaluation and treatment course in the ED, and plan of care upon discharge was discussed in length with the patient. Patient had no further questions prior to being discharged and was instructed to return to the ED for new or worsening symptoms. Care was provided during an unprecedented national emergency due to the novel coronavirus, Covid-19.        FINAL IMPRESSION      1. Rib pain on left side    2. Viral URI with cough            Problem List  Patient Active Problem List   Diagnosis Code    Current smoker F17.200    Schizophrenia ([facility] F20.9    Spinal stenosis M48.00    Convulsions ([facility] R56.9    Cocaine abuse in remission ([facility] F14.11    Marijuana use F12.90    Family history of diabetes mellitus Z83.3    Encounter to establish care with new doctor Z76.89    Chronic pain syndrome G89.4    Fatigue R53.83    History of bilateral tubal ligation Z98.51    Drug overdose, intentional self-harm, initial encounter ([facility] T50.902A    Aspiration pneumonia ([facility] J69.0    Hypokalemia E87.6    Drug overdose T50.901A    Depression with suicidal ideation F32. A, R45.851    Bipolar affective disorder, depressed, severe ([facility] F31.4         DISPOSITION/PLAN   DISPOSITION Decision To Discharge 06/07/2022 07:52:32 PM      PATIENT REFERRED TO:   Call Joselito Arroyo to establish care for follow up    Schedule an appointment as soon as possible for a visit       [facility] ED  [address] [facility]  [phone]    If symptoms worsen, As needed      DISCHARGE MEDICATIONS:     New Prescriptions    ALBUTEROL SULFATE HFA (PROVENTIL HFA) 108 (90 BASE) MCG/ACT INHALER    Inhale 2 puffs into the lungs every 6 hours as needed for Wheezing or Shortness of Breath    BENZONATATE (TESSALON) 100 MG CAPSULE    Take 1-2 capsules by mouth 3 times daily as needed for Cough    IBUPROFEN (ADVIL;MOTRIN) 800 MG TABLET    Take 1 tablet by mouth every 8 hours as needed for Pain or Fever    METHOCARBAMOL (ROBAXIN-750) 750 MG TABLET    Take 1 tablet by mouth 3 times daily    PREDNISONE (DELTASONE) 10 MG TABLET    Take five tablets on days 1-2, four tablets on days 3-4, three tablets on days 5-6, two tablets on day 7-8, one tablet on days 9-10.            (Please note that portions of this note were completed with a voice recognition program.  Efforts were made to edit the dictations but occasionally words are mis-transcribed.)    Warren Velasquez, ADELINA - CNP      [place] Ron, ADELINA - CNP  06/07/22 630 [facility], ADELINA - CNP  06/07/22 7298

## 2022-06-08 LAB
EKG ATRIAL RATE: 75 BPM
EKG P AXIS: 78 DEGREES
EKG P-R INTERVAL: 154 MS
EKG Q-T INTERVAL: 398 MS
EKG QRS DURATION: 82 MS
EKG QTC CALCULATION (BAZETT): 444 MS
EKG R AXIS: 75 DEGREES
EKG T AXIS: 76 DEGREES
EKG VENTRICULAR RATE: 75 BPM

## 2022-06-08 PROCEDURE — 93010 ELECTROCARDIOGRAM REPORT: CPT | Performed by: INTERNAL MEDICINE

## 2022-07-05 ENCOUNTER — APPOINTMENT (OUTPATIENT)
Dept: GENERAL RADIOLOGY | Age: 37
End: 2022-07-05
Payer: MEDICARE

## 2022-07-05 ENCOUNTER — HOSPITAL ENCOUNTER (EMERGENCY)
Age: 37
Discharge: HOME | End: 2022-07-05
Attending: EMERGENCY MEDICINE
Payer: MEDICARE

## 2022-07-05 VITALS
BODY MASS INDEX: 23.3 KG/M2 | OXYGEN SATURATION: 99 % | WEIGHT: 145 LBS | HEIGHT: 66 IN | HEART RATE: 82 BPM | SYSTOLIC BLOOD PRESSURE: 117 MMHG | DIASTOLIC BLOOD PRESSURE: 92 MMHG | TEMPERATURE: 97.9 F | RESPIRATION RATE: 16 BRPM

## 2022-07-05 DIAGNOSIS — K59.00 CONSTIPATION, UNSPECIFIED CONSTIPATION TYPE: Primary | ICD-10-CM

## 2022-07-05 PROCEDURE — 6370000000 HC RX 637 (ALT 250 FOR IP): Performed by: NURSE PRACTITIONER

## 2022-07-05 PROCEDURE — 74019 RADEX ABDOMEN 2 VIEWS: CPT

## 2022-07-05 PROCEDURE — 99283 EMERGENCY DEPT VISIT LOW MDM: CPT

## 2022-07-05 RX ORDER — POLYETHYLENE GLYCOL 3350 17 G/17G
17 POWDER, FOR SOLUTION ORAL DAILY
Qty: 116 G | Refills: 0 | Status: SHIPPED | OUTPATIENT
Start: 2022-07-05

## 2022-07-05 RX ADMIN — MAGESIUM CITRATE 296 ML: 1.75 LIQUID ORAL at 22:14

## 2022-07-05 ASSESSMENT — PAIN - FUNCTIONAL ASSESSMENT: PAIN_FUNCTIONAL_ASSESSMENT: 0-10

## 2022-07-05 ASSESSMENT — ENCOUNTER SYMPTOMS
DIARRHEA: 0
CONSTIPATION: 1
ABDOMINAL PAIN: 1
COLOR CHANGE: 0
SHORTNESS OF BREATH: 0
NAUSEA: 0
BACK PAIN: 0
COUGH: 0
VOMITING: 0

## 2022-07-05 ASSESSMENT — PAIN SCALES - GENERAL: PAINLEVEL_OUTOF10: 10

## 2022-07-06 NOTE — ED PROVIDER NOTES
[facility] (formerly [facility]) ED  eMERGENCY dEPARTMENT eNCOUnter      Pt Name: Pavan Higgins  MRN: [id number]  [place] 1985  Date of evaluation: 7/5/2022  Provider: ADELINA Flowers CNP    CHIEF COMPLAINT       Chief Complaint   Patient presents with    Constipation    Abdominal Pain         HISTORY OF PRESENT ILLNESS  (Location/Symptom, Timing/Onset, Context/Setting, Quality, Duration, Modifying Factors, Severity.)   Pavan Higgins is a 40 y.o. female who presents to the emergency department via private auto for low abd pain, constipation. States she has a hx of chronic constipation, ileus. She cannot recall when her last BM was. She does not currently have a pcp and is out of her medications. States she is out of her Reglan and Linzess. Denies fever, chills, N/V, urinary sx. Rates her pain 10/10 at this time. Denies chance of pregnancy. Nursing Notes were reviewed.     ALLERGIES     Dilaudid [hydromorphone hcl]    CURRENT MEDICATIONS       Previous Medications    ACETAMINOPHEN (TYLENOL) 325 MG TABLET    Take 2 tablets by mouth every 6 hours as needed for Pain    ALBUTEROL SULFATE HFA (PROVENTIL HFA) 108 (90 BASE) MCG/ACT INHALER    Inhale 2 puffs into the lungs every 6 hours as needed for Wheezing or Shortness of Breath    ARIPIPRAZOLE (ABILIFY) 5 MG TABLET    Take 1 tablet by mouth daily    ETODOLAC (LODINE) 500 MG TABLET    Take 1 tablet by mouth 2 times daily for 10 days    HYDROCORTISONE-ALOE 1 % CREA CREAM    Apply topically 2 times daily    IBUPROFEN (ADVIL;MOTRIN) 200 MG TABLET    Take 2 tablets by mouth every 6 hours as needed for Pain or Fever    IBUPROFEN (ADVIL;MOTRIN) 800 MG TABLET    Take 1 tablet by mouth every 8 hours as needed for Pain or Fever    METHOCARBAMOL (ROBAXIN-750) 750 MG TABLET    Take 1 tablet by mouth 3 times daily    PRAZOSIN (MINIPRESS) 1 MG CAPSULE    Take 1 capsule by mouth nightly    PREDNISONE (DELTASONE) 10 MG TABLET    Take five tablets on days 1-2, four tablets on days 3-4, three tablets on days 5-6, two tablets on day 7-8, one tablet on days 9-10. SERTRALINE (ZOLOFT) 25 MG TABLET    Take 1 tablet by mouth daily       PAST MEDICAL HISTORY         Diagnosis Date    ADHD     Allergy     dilaudid    Aspiration pneumonia ([address] 2021    Bipolar 1 disorder ([address]     Bipolar affective disorder, depressed, severe ([address] 2021    Bipolar affective disorder, depressed, severe ([address] 2021    Bronchitis, chronic ([address]     Cocaine abuse with intoxication with complication ([address] 2/3/7060    Depression with suicidal ideation 2021    Osteoarthritis     Osteoarthritis     Rh negative state in antepartum period 2012    Schizophrenia ([address]     Seizures ([address] 2016    Spinal stenosis        SURGICAL HISTORY           Procedure Laterality Date    CHOLECYSTECTOMY      DILATION AND CURETTAGE      2 x 2011 post partum hemorrhage & SAB    DILATION AND CURETTAGE OF UTERUS      DILATION AND CURETTAGE OF UTERUS      x3    HERNIA REPAIR      HERNIA REPAIR  2011    HERNIA REPAIR      x2    MANDIBLE FRACTURE SURGERY      MANDIBLE SURGERY      UPPER GASTROINTESTINAL ENDOSCOPY           FAMILY HISTORY           Problem Relation Age of Onset    Cancer Maternal Grandmother     Diabetes Maternal Grandmother     Diabetes Maternal Grandfather     Hypertension Mother     Breast Cancer Neg Hx     Colon Cancer Neg Hx     Eclampsia Neg Hx     Ovarian Cancer Neg Hx      Labor Neg Hx     Spont Abortions Neg Hx     Stroke Neg Hx      Family Status   Relation Name Status    MGM  (Not Specified)    MGF  (Not Specified)    Mother  (Not Specified)    Neg Hx  (Not Specified)        SOCIAL HISTORY      reports that she has been smoking cigarettes. She started smoking about 22 years ago. She has a 5.50 pack-year smoking history. She has never used smokeless tobacco. She reports current drug use. Drug: Marijuana [place]).  She reports that she does not drink alcohol. REVIEW OF SYSTEMS    (2-9 systems for level 4, 10 or more for level 5)     Review of Systems   Constitutional: Negative for chills, diaphoresis, fatigue and fever. Respiratory: Negative for cough and shortness of breath. Cardiovascular: Negative for chest pain. Gastrointestinal: Positive for abdominal pain and constipation. Negative for diarrhea, nausea and vomiting. Genitourinary: Negative for dysuria, flank pain, frequency, hematuria and urgency. Musculoskeletal: Negative for arthralgias and back pain. Skin: Negative for color change, rash and wound. Neurological: Negative for weakness. Except as noted above the remainder of the review of systems was reviewed and negative. PHYSICAL EXAM    (up to 7 for level 4, 8 or more for level 5)     ED Triage Vitals   BP Temp Temp Source Heart Rate Resp SpO2 Height Weight   07/05/22 2007 07/05/22 2007 07/05/22 2007 07/05/22 2007 07/05/22 2010 07/05/22 2007 07/05/22 2007 07/05/22 2007   (!) 117/92 97.9 °F (36.6 °C) Oral 82 16 99 % 5' 6\" (1.676 m) 145 lb (65.8 kg)     Physical Exam  Vitals reviewed. Constitutional:       General: She is not in acute distress. Appearance: She is well-developed. She is not diaphoretic. Eyes:      General: No scleral icterus. Conjunctiva/sclera: Conjunctivae normal.   Cardiovascular:      Rate and Rhythm: Normal rate. Pulmonary:      Effort: Pulmonary effort is normal. No respiratory distress. Breath sounds: No stridor. Abdominal:      General: There is no distension. Palpations: Abdomen is soft. Tenderness: There is generalized abdominal tenderness. There is no guarding. Musculoskeletal:      Comments: Moves extremities. Skin:     General: Skin is warm and dry. Findings: No rash. Neurological:      Mental Status: She is alert and oriented to person, place, and time.    Psychiatric:         Behavior: Behavior normal.           DIAGNOSTIC RESULTS RADIOLOGY:   Non-plain film images such as CT, Ultrasound and MRI are read by the radiologist. Plain radiographic images are visualized and preliminarily interpreted by the emergency physician with the below findings:    Interpretation per the Radiologist below, if available at the time of this note:    XR ABDOMEN (2 VIEWS)    Result Date: 7/5/2022  EXAMINATION: TWO XRAY VIEWS OF THE ABDOMEN 7/5/2022 5:32 pm COMPARISON: None. HISTORY: ORDERING SYSTEM PROVIDED HISTORY: constipation TECHNOLOGIST PROVIDED HISTORY: constipation Reason for Exam: constipation, abdominal pain. patient states a history of constipation issues her whole life FINDINGS: Lung bases are clear. Nonobstructive bowel gas pattern. No free air. Moderate volume of stool in cecum and ascending colon and in the rectosigmoid. Cholecystectomy. No acute bony findings. Nonobstructive bowel gas pattern with moderate colonic stool in the cecum and ascending colon and rectosigmoid. EMERGENCY DEPARTMENT COURSE and DIFFERENTIAL DIAGNOSIS/MDM:   Vitals:    Vitals:    07/05/22 2007 07/05/22 2010   BP: (!) 117/92    Pulse: 82    Resp:  16   Temp: 97.9 °F (36.6 °C)    TempSrc: Oral    SpO2: 99%    Weight: 145 lb (65.8 kg)    Height: 5' 6\" (1.676 m)        MEDICATIONS GIVEN IN THE ED:  Medications   magnesium citrate solution 296 mL (has no administration in time range)       CLINICAL DECISION MAKING:  The patient presented alert with a nontoxic appearance and was seen in conjunction with Dr. Noah Hearn. Imaging showed increased stool. She was sent home with a bottle of magnesium citrate and instructions for use. A prescription was written for miralax. Follow up with a pcp for a recheck, further evaluation and treatment. Evaluation and treatment course in the ED, and plan of care upon discharge was discussed in length with the patient.   Patient had no further questions prior to being discharged and was instructed to return to the ED for new or worsening symptoms. The patient did not take the bottle of magnesium citrate with her when she left the department. Care was provided during an unprecedented national emergency due to the novel coronavirus, Covid-19. FINAL IMPRESSION      1. Constipation, unspecified constipation type            Problem List  Patient Active Problem List   Diagnosis Code    Current smoker F17.200    Schizophrenia ([facility] F20.9    Spinal stenosis M48.00    Convulsions ([facility] R56.9    Cocaine abuse in remission ([facility] F14.11    Marijuana use F12.90    Family history of diabetes mellitus Z83.3    Encounter to establish care with new doctor Z76.89    Chronic pain syndrome G89.4    Fatigue R53.83    History of bilateral tubal ligation Z98.51    Drug overdose, intentional self-harm, initial encounter ([facility] T50.902A    Aspiration pneumonia ([facility] J69.0    Hypokalemia E87.6    Drug overdose T50.901A    Depression with suicidal ideation F32. A, R45.851    Bipolar affective disorder, depressed, severe ([facility] F31.4         DISPOSITION/PLAN   DISPOSITION  DISCHARGE      PATIENT REFERRED TO:   Call [facility] to establish care for follow up    Schedule an appointment as soon as possible for a visit         DISCHARGE MEDICATIONS:     New Prescriptions    POLYETHYLENE GLYCOL (MIRALAX) 17 GM/SCOOP POWDER    Take 17 g by mouth daily           (Please note that portions of this note were completed with a voice recognition program.  Efforts were made to edit the dictations but occasionally words are mis-transcribed.)    Sherie Hodgkin, APRN - CNP Sherie Hodgkin, APRN - CNP  07/05/22 6999

## 2022-07-06 NOTE — ED PROVIDER NOTES
eMERGENCY dEPARTMENT eNCOUnter   Independent Attestation     Pt Name: Felicia Powell  MRN: [id number]  [place] 1985  Date of evaluation: 7/5/22     Felicia Powell is a 40 y.o. female with CC: Constipation and Abdominal Pain        This visit was performed by both a physician and an APC. I performed all aspects of the MDM as documented.       The care is provided during an unprecedented national emergency due to the novel coronavirus, Alan Ba MD  Attending Emergency Physician           Josh Krueger MD  07/05/22 2044

## 2022-07-06 NOTE — ED NOTES
Pt arrived to ED with c/o constipation. Pt states she takes lactose twice a day but unable to state the last time she went to the bathroom. Pt states this happens often to her.       Rogerio Bryant RN  07/05/22 6148

## 2022-07-11 ENCOUNTER — HOSPITAL ENCOUNTER (EMERGENCY)
Age: 37
Discharge: HOME | End: 2022-07-11

## 2022-07-11 VITALS
TEMPERATURE: 97.3 F | WEIGHT: 145 LBS | SYSTOLIC BLOOD PRESSURE: 113 MMHG | OXYGEN SATURATION: 98 % | DIASTOLIC BLOOD PRESSURE: 75 MMHG | RESPIRATION RATE: 15 BRPM | BODY MASS INDEX: 23.3 KG/M2 | HEART RATE: 83 BPM | HEIGHT: 66 IN

## 2022-08-01 ENCOUNTER — HOSPITAL ENCOUNTER (INPATIENT)
Age: 37
LOS: 4 days | Discharge: HOME | DRG: 885 | End: 2022-08-05
Attending: EMERGENCY MEDICINE | Admitting: PSYCHIATRY & NEUROLOGY
Payer: MEDICARE

## 2022-08-01 DIAGNOSIS — F32.A DEPRESSION WITH SUICIDAL IDEATION: Primary | ICD-10-CM

## 2022-08-01 DIAGNOSIS — R45.851 DEPRESSION WITH SUICIDAL IDEATION: Primary | ICD-10-CM

## 2022-08-01 LAB
ABSOLUTE EOS #: 0.2 K/UL (ref 0–0.4)
ABSOLUTE LYMPH #: 2.5 K/UL (ref 1–4.8)
ABSOLUTE MONO #: 0.7 K/UL (ref 0.1–1.3)
ACETAMINOPHEN LEVEL: <5 UG/ML (ref 10–30)
ALBUMIN SERPL-MCNC: 4.3 G/DL (ref 3.5–5.2)
ALP BLD-CCNC: 65 U/L (ref 35–104)
ALT SERPL-CCNC: 16 U/L (ref 5–33)
AMPHETAMINE SCREEN URINE: NEGATIVE
ANION GAP SERPL CALCULATED.3IONS-SCNC: 12 MMOL/L (ref 9–17)
AST SERPL-CCNC: 19 U/L
BARBITURATE SCREEN URINE: NEGATIVE
BASOPHILS # BLD: 1 % (ref 0–2)
BASOPHILS ABSOLUTE: 0.1 K/UL (ref 0–0.2)
BENZODIAZEPINE SCREEN, URINE: NEGATIVE
BILIRUB SERPL-MCNC: 0.45 MG/DL (ref 0.3–1.2)
BUN BLDV-MCNC: 13 MG/DL (ref 6–20)
CALCIUM SERPL-MCNC: 9.4 MG/DL (ref 8.6–10.4)
CANNABINOID SCREEN URINE: POSITIVE
CHLORIDE BLD-SCNC: 101 MMOL/L (ref 98–107)
CO2: 24 MMOL/L (ref 20–31)
COCAINE METABOLITE, URINE: POSITIVE
CREAT SERPL-MCNC: 0.79 MG/DL (ref 0.5–0.9)
EOSINOPHILS RELATIVE PERCENT: 2 % (ref 0–4)
ETHANOL PERCENT: <0.01 %
ETHANOL: <10 MG/DL
FENTANYL URINE: POSITIVE
GFR AFRICAN AMERICAN: >60 ML/MIN
GFR NON-AFRICAN AMERICAN: >60 ML/MIN
GFR SERPL CREATININE-BSD FRML MDRD: ABNORMAL ML/MIN/{1.73_M2}
GLUCOSE BLD-MCNC: 108 MG/DL (ref 70–99)
HCG(URINE) PREGNANCY TEST: NEGATIVE
HCT VFR BLD CALC: 41.5 % (ref 36–46)
HEMOGLOBIN: 14.2 G/DL (ref 12–16)
LYMPHOCYTES # BLD: 23 % (ref 24–44)
MAGNESIUM: 2 MG/DL (ref 1.6–2.6)
MCH RBC QN AUTO: 31.3 PG (ref 26–34)
MCHC RBC AUTO-ENTMCNC: 34.2 G/DL (ref 31–37)
MCV RBC AUTO: 91.4 FL (ref 80–100)
METHADONE SCREEN, URINE: NEGATIVE
MONOCYTES # BLD: 7 % (ref 1–7)
OPIATES, URINE: NEGATIVE
OXYCODONE SCREEN URINE: NEGATIVE
PDW BLD-RTO: 13.6 % (ref 11.5–14.9)
PHENCYCLIDINE, URINE: NEGATIVE
PLATELET # BLD: 416 K/UL (ref 150–450)
PMV BLD AUTO: 7.2 FL (ref 6–12)
POTASSIUM SERPL-SCNC: 3.5 MMOL/L (ref 3.7–5.3)
RBC # BLD: 4.54 M/UL (ref 4–5.2)
SALICYLATE LEVEL: <1 MG/DL (ref 3–10)
SEG NEUTROPHILS: 67 % (ref 36–66)
SEGMENTED NEUTROPHILS ABSOLUTE COUNT: 7.3 K/UL (ref 1.3–9.1)
SODIUM BLD-SCNC: 137 MMOL/L (ref 135–144)
TEST INFORMATION: ABNORMAL
TOTAL PROTEIN: 7 G/DL (ref 6.4–8.3)
WBC # BLD: 10.8 K/UL (ref 3.5–11)

## 2022-08-01 PROCEDURE — 81025 URINE PREGNANCY TEST: CPT

## 2022-08-01 PROCEDURE — 80179 DRUG ASSAY SALICYLATE: CPT

## 2022-08-01 PROCEDURE — 85025 COMPLETE CBC W/AUTO DIFF WBC: CPT

## 2022-08-01 PROCEDURE — 83735 ASSAY OF MAGNESIUM: CPT

## 2022-08-01 PROCEDURE — 80143 DRUG ASSAY ACETAMINOPHEN: CPT

## 2022-08-01 PROCEDURE — 36415 COLL VENOUS BLD VENIPUNCTURE: CPT

## 2022-08-01 PROCEDURE — 1240000000 HC EMOTIONAL WELLNESS R&B

## 2022-08-01 PROCEDURE — G0480 DRUG TEST DEF 1-7 CLASSES: HCPCS

## 2022-08-01 PROCEDURE — 6370000000 HC RX 637 (ALT 250 FOR IP): Performed by: PSYCHIATRY & NEUROLOGY

## 2022-08-01 PROCEDURE — 99285 EMERGENCY DEPT VISIT HI MDM: CPT

## 2022-08-01 PROCEDURE — 80307 DRUG TEST PRSMV CHEM ANLYZR: CPT

## 2022-08-01 PROCEDURE — 80053 COMPREHEN METABOLIC PANEL: CPT

## 2022-08-01 RX ORDER — DIPHENHYDRAMINE HYDROCHLORIDE 50 MG/ML
50 INJECTION INTRAMUSCULAR; INTRAVENOUS EVERY 6 HOURS PRN
Status: DISCONTINUED | OUTPATIENT
Start: 2022-08-01 | End: 2022-08-05 | Stop reason: HOSPADM

## 2022-08-01 RX ORDER — HALOPERIDOL 5 MG/ML
5 INJECTION INTRAMUSCULAR EVERY 6 HOURS PRN
Status: DISCONTINUED | OUTPATIENT
Start: 2022-08-01 | End: 2022-08-05 | Stop reason: HOSPADM

## 2022-08-01 RX ORDER — POLYETHYLENE GLYCOL 3350 17 G/17G
17 POWDER, FOR SOLUTION ORAL DAILY PRN
Status: DISCONTINUED | OUTPATIENT
Start: 2022-08-01 | End: 2022-08-05 | Stop reason: HOSPADM

## 2022-08-01 RX ORDER — HALOPERIDOL 5 MG
5 TABLET ORAL EVERY 6 HOURS PRN
Status: DISCONTINUED | OUTPATIENT
Start: 2022-08-01 | End: 2022-08-05 | Stop reason: HOSPADM

## 2022-08-01 RX ORDER — TRAZODONE HYDROCHLORIDE 50 MG/1
50 TABLET ORAL NIGHTLY PRN
Status: DISCONTINUED | OUTPATIENT
Start: 2022-08-02 | End: 2022-08-01

## 2022-08-01 RX ORDER — MAGNESIUM HYDROXIDE/ALUMINUM HYDROXICE/SIMETHICONE 120; 1200; 1200 MG/30ML; MG/30ML; MG/30ML
30 SUSPENSION ORAL EVERY 6 HOURS PRN
Status: DISCONTINUED | OUTPATIENT
Start: 2022-08-01 | End: 2022-08-05 | Stop reason: HOSPADM

## 2022-08-01 RX ORDER — ACETAMINOPHEN 325 MG/1
650 TABLET ORAL EVERY 6 HOURS PRN
Status: DISCONTINUED | OUTPATIENT
Start: 2022-08-01 | End: 2022-08-05 | Stop reason: HOSPADM

## 2022-08-01 RX ORDER — HYDROXYZINE 50 MG/1
50 TABLET, FILM COATED ORAL 3 TIMES DAILY PRN
Status: DISCONTINUED | OUTPATIENT
Start: 2022-08-01 | End: 2022-08-05 | Stop reason: HOSPADM

## 2022-08-01 RX ORDER — LORAZEPAM 2 MG/ML
2 INJECTION INTRAMUSCULAR EVERY 6 HOURS PRN
Status: DISCONTINUED | OUTPATIENT
Start: 2022-08-01 | End: 2022-08-05 | Stop reason: HOSPADM

## 2022-08-01 RX ORDER — TRAZODONE HYDROCHLORIDE 50 MG/1
50 TABLET ORAL NIGHTLY PRN
Status: DISCONTINUED | OUTPATIENT
Start: 2022-08-01 | End: 2022-08-05 | Stop reason: HOSPADM

## 2022-08-01 RX ORDER — IBUPROFEN 400 MG/1
400 TABLET ORAL EVERY 6 HOURS PRN
Status: DISCONTINUED | OUTPATIENT
Start: 2022-08-01 | End: 2022-08-05 | Stop reason: HOSPADM

## 2022-08-01 RX ORDER — LORAZEPAM 1 MG/1
2 TABLET ORAL EVERY 6 HOURS PRN
Status: DISCONTINUED | OUTPATIENT
Start: 2022-08-01 | End: 2022-08-05 | Stop reason: HOSPADM

## 2022-08-01 RX ADMIN — TRAZODONE HYDROCHLORIDE 50 MG: 50 TABLET ORAL at 23:48

## 2022-08-01 RX ADMIN — HYDROXYZINE HYDROCHLORIDE 50 MG: 50 TABLET, FILM COATED ORAL at 23:48

## 2022-08-01 ASSESSMENT — ENCOUNTER SYMPTOMS
SHORTNESS OF BREATH: 0
ABDOMINAL PAIN: 0
COLOR CHANGE: 0
BACK PAIN: 0
EYE PAIN: 0

## 2022-08-01 ASSESSMENT — LIFESTYLE VARIABLES
HOW MANY STANDARD DRINKS CONTAINING ALCOHOL DO YOU HAVE ON A TYPICAL DAY: PATIENT DOES NOT DRINK
HOW OFTEN DO YOU HAVE A DRINK CONTAINING ALCOHOL: NEVER
HOW OFTEN DO YOU HAVE A DRINK CONTAINING ALCOHOL: NEVER
HOW MANY STANDARD DRINKS CONTAINING ALCOHOL DO YOU HAVE ON A TYPICAL DAY: PATIENT DOES NOT DRINK

## 2022-08-01 ASSESSMENT — PATIENT HEALTH QUESTIONNAIRE - PHQ9: SUM OF ALL RESPONSES TO PHQ QUESTIONS 1-9: 16

## 2022-08-01 ASSESSMENT — SLEEP AND FATIGUE QUESTIONNAIRES
SLEEP PATTERN: NIGHTMARES/TERRORS
DO YOU USE A SLEEP AID: YES
DO YOU HAVE DIFFICULTY SLEEPING: NO
AVERAGE NUMBER OF SLEEP HOURS: 7

## 2022-08-01 ASSESSMENT — PAIN - FUNCTIONAL ASSESSMENT: PAIN_FUNCTIONAL_ASSESSMENT: NONE - DENIES PAIN

## 2022-08-01 ASSESSMENT — PAIN SCALES - GENERAL: PAINLEVEL_OUTOF10: 0

## 2022-08-02 PROBLEM — F18.10 ABUSE OF SMOKED SUBSTANCE (HCC): Status: ACTIVE | Noted: 2022-08-02

## 2022-08-02 PROBLEM — J45.20 INTERMITTENT ASTHMA WITHOUT COMPLICATION: Status: ACTIVE | Noted: 2022-08-02

## 2022-08-02 PROBLEM — F19.10 POLYSUBSTANCE ABUSE (HCC): Status: ACTIVE | Noted: 2022-08-02

## 2022-08-02 PROCEDURE — 6370000000 HC RX 637 (ALT 250 FOR IP): Performed by: PSYCHIATRY & NEUROLOGY

## 2022-08-02 PROCEDURE — 6370000000 HC RX 637 (ALT 250 FOR IP)

## 2022-08-02 PROCEDURE — 1240000000 HC EMOTIONAL WELLNESS R&B

## 2022-08-02 PROCEDURE — 6370000000 HC RX 637 (ALT 250 FOR IP): Performed by: INTERNAL MEDICINE

## 2022-08-02 PROCEDURE — 99223 1ST HOSP IP/OBS HIGH 75: CPT | Performed by: INTERNAL MEDICINE

## 2022-08-02 PROCEDURE — 90792 PSYCH DIAG EVAL W/MED SRVCS: CPT | Performed by: PSYCHIATRY & NEUROLOGY

## 2022-08-02 PROCEDURE — APPSS60 APP SPLIT SHARED TIME 46-60 MINUTES

## 2022-08-02 RX ORDER — OLANZAPINE 5 MG/1
5 TABLET ORAL NIGHTLY
Status: DISCONTINUED | OUTPATIENT
Start: 2022-08-02 | End: 2022-08-03

## 2022-08-02 RX ORDER — OXCARBAZEPINE 150 MG/1
150 TABLET, FILM COATED ORAL 2 TIMES DAILY
Status: ON HOLD | COMMUNITY
End: 2022-08-04 | Stop reason: HOSPADM

## 2022-08-02 RX ORDER — FLUOXETINE 10 MG/1
10 CAPSULE ORAL DAILY
Status: DISCONTINUED | OUTPATIENT
Start: 2022-08-02 | End: 2022-08-05 | Stop reason: HOSPADM

## 2022-08-02 RX ORDER — LIDOCAINE 4 G/G
1 PATCH TOPICAL DAILY
Status: DISCONTINUED | OUTPATIENT
Start: 2022-08-02 | End: 2022-08-05 | Stop reason: HOSPADM

## 2022-08-02 RX ORDER — DOXEPIN HYDROCHLORIDE 25 MG/1
25 CAPSULE ORAL NIGHTLY
Status: ON HOLD | COMMUNITY
End: 2022-08-04 | Stop reason: HOSPADM

## 2022-08-02 RX ADMIN — OLANZAPINE 5 MG: 5 TABLET, FILM COATED ORAL at 20:52

## 2022-08-02 RX ADMIN — TRAZODONE HYDROCHLORIDE 50 MG: 50 TABLET ORAL at 20:54

## 2022-08-02 RX ADMIN — FLUOXETINE 10 MG: 10 CAPSULE ORAL at 17:11

## 2022-08-02 RX ADMIN — ACETAMINOPHEN 650 MG: 325 TABLET, FILM COATED ORAL at 08:49

## 2022-08-02 ASSESSMENT — PAIN DESCRIPTION - ORIENTATION: ORIENTATION: LOWER

## 2022-08-02 ASSESSMENT — PAIN DESCRIPTION - LOCATION: LOCATION: BACK

## 2022-08-02 ASSESSMENT — LIFESTYLE VARIABLES
HOW MANY STANDARD DRINKS CONTAINING ALCOHOL DO YOU HAVE ON A TYPICAL DAY: PATIENT DOES NOT DRINK
HOW OFTEN DO YOU HAVE A DRINK CONTAINING ALCOHOL: NEVER

## 2022-08-02 ASSESSMENT — PAIN SCALES - GENERAL: PAINLEVEL_OUTOF10: 3

## 2022-08-02 ASSESSMENT — PAIN DESCRIPTION - DESCRIPTORS: DESCRIPTORS: ACHING

## 2022-08-02 NOTE — GROUP NOTE
Group Therapy Note    Date: 8/2/2022    Group Start Time: 2194  Group End Time: 2478  Group Topic: Psychoeducation    [facility] SUMEET Ambriz, CTRS    Psych-Ed/Relapse Prevention Group Note        Date: August 2, 2022 Start Time: 1:45pm   End Time: 2:20pm       Number of Participants in Group & Unit Census:  3    Topic: Apples to Apples    Goal of Group:Patient will improve interpersonal communication and formulating appropriate judgements. Comments:     Patient did not participate in Psych-Ed/Relapse Prevention group, despite staff encouragement and explanation of benefits. Patient remain seclusive to self. Q15 minute safety checks maintained for patient safety and will continue to encourage patient to attend unit programming.           Signature:  Lida Ambriz, [address]

## 2022-08-02 NOTE — BH NOTE
[facility]  Admission Note     Admission Type:   Admission Type: Voluntary    Reason for admission:  Reason for Admission: Suicidal ideation. Patient reports increased depression, hopelessness, and helpelessness related to being off of her medications. Patient had no plan but intent.       Addictive Behavior:   Addictive Behavior  In the Past 3 Months, Have You Felt or Has Someone Told You That You Have a Problem With  : None    Medical Problems:   Past Medical History:   Diagnosis Date    ADHD     Allergy     dilaudid    Aspiration pneumonia ([address] 12/27/2021    Bipolar 1 disorder ([address]     Bipolar affective disorder, depressed, severe ([address] 12/30/2021    Bipolar affective disorder, depressed, severe ([address] 12/30/2021    Bronchitis, chronic ([address]     Cocaine abuse with intoxication with complication ([facility] 2/9/8850    Depression with suicidal ideation 12/29/2021    Osteoarthritis     Osteoarthritis     Rh negative state in antepartum period 5/8/2012    Schizophrenia ([facility]     Seizures ([facility] 7/5/2016    Spinal stenosis        Status EXAM:  Mental Status and Behavioral Exam  Normal: No  Level of Assistance: Independent/Self  Facial Expression: Avoids Gaze, Flat  Affect: Constricted  Level of Consciousness: Alert  Frequency of Checks: 4 times per hour, close  Mood:Normal: No  Mood: Depressed, Anxious, Helpless  Motor Activity:Normal: No  Motor Activity: Decreased, Unusual posture/gait  Eye Contact: Poor  Observed Behavior: Cooperative, Guarded  Sexual Misconduct History: Current - no  Preception: [place] to person, [place] to time, [place] to place, [place] to situation  Attention:Normal: No  Attention: Unable to concentrate  Thought Processes: Other (comment) (logical)  Thought Content:Normal: No  Thought Content: Preoccupations  Depression Symptoms: Feelings of helplessness, Feelings of hopelessess, Feelings of worthlessness  Anxiety Symptoms: Generalized  Darcy Symptoms: No problems reported or observed. Hallucinations: None  Delusions: No  Memory:Normal: No  Memory: Poor recent  Insight and Judgment: No  Insight and Judgment: Poor judgment, Poor insight    Tobacco Screening:  Practical Counseling, on admission, larry X, if applicable and completed (first 3 are required if patient doesn't refuse):            ( ) Recognizing danger situations (included triggers and roadblocks)                    ( ) Coping skills (new ways to manage stress,relaxation techniques, changing routine, distraction)                                                           ( ) Basic information about quitting (benefits of quitting, techniques in how to quit, available resources  ( ) Referral for counseling faxed to [facility]                                                                                                                     (X) Patient refused counseling  ( ) Patient has not smoked in the last 30 days    Metabolic Screening:    No results found for: LABA1C    No results found for: CHOL  No results found for: TRIG  No results found for: HDL  No components found for: LDLCAL  No results found for: LABVLDL      Body mass index is 23.4 kg/m². BP Readings from Last 2 Encounters:   08/01/22 107/67   07/05/22 (!) 117/92           Pt admitted with followings belongings:  Dental Appliances: None  Vision - Corrective Lenses: None  Hearing Aid: None  Jewelry: Body Piercing  Body Piercings Removed: No  Clothing: Footwear, Undergarments, Pants, Shirt  Other Valuables: Lighter/Matches, Purse, Wallet, Personal Toiletries, Other (Comment) (bag with makeup, brown bag with makeup)    Patient is admitted to the [address] [place] [address] Unit due to depression with suicidal ideation, without a specific plan. Patient has been off of her psychiatric medications, which she states is what lead her to becoming more depressed, hopeless, and helpless. Patient denies suicidal and homicidal ideation on admission.  Patient denies symptoms of psychosis. Patient denies alcohol use, however, reports marijuana and cocaine use \"yesterday. \" Patient is cooperative with the admission process and answered all assessment questions. Patient was provided an opportunity to receive numbers out of her cell phone, patient denied due to already having them written down. Patient is accepting of the [facility] policies that her phone will be locked up until discharge.      Gato Smart RN

## 2022-08-02 NOTE — ED PROVIDER NOTES
EMERGENCY DEPARTMENT ENCOUNTER    Pt Name: Taryn Vera  MRN: [id number]  [place] 1985  Date of evaluation: 22  CHIEF COMPLAINT       Chief Complaint   Patient presents with    Mental Health Problem     HISTORY OF PRESENT ILLNESS   44-year-old female presents for mental health evaluation. Patient reports that she has been feeling increasingly depressed recently, states that there is been a lot going on in her life, states that she does not currently have custody of her children this has been a major source of issue for her, states that she also recently lost her grandmother and was not allowed to go to the .  Patient reports that in the last couple days she has been feeling increasing depressed and having thoughts of suicide. Patient denies any specific plan currently, but reports that she is concerned that she might act on her suicidal thoughts, denies any homicidal ideation, denies any visual or auditory hallucinations, admits to marijuana and cocaine use, last use was yesterday per patient, denies any alcohol use, denies other complaints at this time. The history is provided by the patient. REVIEW OF SYSTEMS     Review of Systems   Constitutional:  Negative for fever. HENT:  Negative for congestion and ear pain. Eyes:  Negative for pain. Respiratory:  Negative for shortness of breath. Cardiovascular:  Negative for chest pain, palpitations and leg swelling. Gastrointestinal:  Negative for abdominal pain. Genitourinary:  Negative for dysuria and flank pain. Musculoskeletal:  Negative for back pain. Skin:  Negative for color change. Neurological:  Negative for numbness and headaches. Psychiatric/Behavioral:  Positive for suicidal ideas. Negative for confusion. All other systems reviewed and are negative.   PASTMEDICAL HISTORY     Past Medical History:   Diagnosis Date    ADHD     Allergy     dilaudid    Aspiration pneumonia ([facility] [address] 2021    Bipolar 1 disorder ([address]     Bipolar affective disorder, depressed, severe ([address] 12/30/2021    Bipolar affective disorder, depressed, severe ([address] 12/30/2021    Bronchitis, chronic ([address]     Cocaine abuse with intoxication with complication ([address] 1/0/9837    Depression with suicidal ideation 12/29/2021    Osteoarthritis     Osteoarthritis     Rh negative state in antepartum period 5/8/2012    Schizophrenia ([address]     Seizures ([address] 7/5/2016    Spinal stenosis      Past Problem List  Patient Active Problem List   Diagnosis Code    Current smoker F17.200    Schizophrenia ([address] F20.9    Spinal stenosis M48.00    Convulsions ([address] R56.9    Cocaine abuse in remission ([address] F14.11    Marijuana use F12.90    Family history of diabetes mellitus Z83.3    Encounter to establish care with new doctor Z76.89    Chronic pain syndrome G89.4    Fatigue R53.83    History of bilateral tubal ligation Z98.51    Drug overdose, intentional self-harm, initial encounter ([address] T50.902A    Aspiration pneumonia ([address] J69.0    Hypokalemia E87.6    Drug overdose T50.901A    Depression with suicidal ideation F32. A, R45.851    Bipolar affective disorder, depressed, severe ([address] F31.4     SURGICAL HISTORY       Past Surgical History:   Procedure Laterality Date    CHOLECYSTECTOMY      DILATION AND CURETTAGE      2 x 2011 post partum hemorrhage & SAB    DILATION AND CURETTAGE OF UTERUS      DILATION AND CURETTAGE OF UTERUS      x3    HERNIA REPAIR      HERNIA REPAIR  2011    HERNIA REPAIR      x2    MANDIBLE FRACTURE SURGERY      MANDIBLE SURGERY      UPPER GASTROINTESTINAL ENDOSCOPY       CURRENT MEDICATIONS       Current Discharge Medication List        CONTINUE these medications which have NOT CHANGED    Details   cariprazine hcl (VRAYLAR) 1.5 MG capsule Take 1.5 mg by mouth in the morning.       doxepin (SINEQUAN) 25 MG capsule Take 25 mg by mouth nightly      OXcarbazepine (TRILEPTAL) 150 MG tablet Take 150 mg by mouth in the morning and 150 mg before bedtime. polyethylene glycol (MIRALAX) 17 GM/SCOOP powder Take 17 g by mouth daily  Qty: 116 g, Refills: 0      albuterol sulfate HFA (PROVENTIL HFA) 108 (90 Base) MCG/ACT inhaler Inhale 2 puffs into the lungs every 6 hours as needed for Wheezing or Shortness of Breath  Qty: 18 g, Refills: 0           ALLERGIES     is allergic to dilaudid [hydromorphone hcl]. FAMILY HISTORY     She indicated that the status of her mother is unknown. She indicated that the status of her maternal grandmother is unknown. She indicated that the status of her maternal grandfather is unknown. She indicated that the status of her neg hx is unknown. SOCIAL HISTORY       Social History     Tobacco Use    Smoking status: Every Day     Packs/day: 0.50     Years: 11.00     Pack years: 5.50     Types: Cigarettes     Start date: 2000    Smokeless tobacco: Never   Substance Use Topics    Alcohol use: No     Comment: on occ    Drug use: Yes     Types: Marijuana (Weed)     PHYSICAL EXAM     INITIAL VITALS: /80   Pulse 65   Temp 97.7 °F (36.5 °C)   Resp 14   Ht 5' 6\" (1.676 m)   Wt 145 lb (65.8 kg)   LMP  (LMP Unknown)   SpO2 99%   BMI 23.40 kg/m²    Physical Exam  Vitals and nursing note reviewed. Constitutional:       General: She is not in acute distress. Appearance: Normal appearance. She is not toxic-appearing. HENT:      Head: Normocephalic and atraumatic. Nose: Nose normal.      Mouth/Throat:      Mouth: Mucous membranes are moist.      Pharynx: Oropharynx is clear. Eyes:      Extraocular Movements: Extraocular movements intact. Conjunctiva/sclera: Conjunctivae normal.      Pupils: Pupils are equal, round, and reactive to light. Cardiovascular:      Rate and Rhythm: Normal rate and regular rhythm. Pulses: Normal pulses. Heart sounds: Normal heart sounds. Pulmonary:      Effort: Pulmonary effort is normal.      Breath sounds: Normal breath sounds.    Abdominal:      General: Bowel sounds are normal. There is no distension. Palpations: Abdomen is soft. Tenderness: There is no abdominal tenderness. Musculoskeletal:         General: Normal range of motion. Cervical back: Normal range of motion. No spinous process tenderness or muscular tenderness. Skin:     General: Skin is warm and dry. Capillary Refill: Capillary refill takes less than 2 seconds. Neurological:      General: No focal deficit present. Mental Status: She is alert and oriented to person, place, and time. Cranial Nerves: Cranial nerves are intact. Sensory: Sensation is intact. Motor: Motor function is intact. Psychiatric:         Attention and Perception: She does not perceive auditory or visual hallucinations. Mood and Affect: Mood is anxious. Affect is tearful. Thought Content: Thought content includes suicidal ideation. Thought content does not include homicidal ideation. MEDICAL DECISION MAKIN-year-old female presents for mental health evaluation. On initial exam patient in no acute distress vitals are stable, patient voicing active suicidal thoughts with no plan, voicing concern that she will act on these thoughts, will check labs, patient to be seen by social work    Labs reviewed and unremarkable, given patient with active suicidal thoughts and concern that she will act on them feel she would benefit from an admission, medically clear, patient agreeable to admission         CRITICAL CARE:       PROCEDURES:    Procedures    DIAGNOSTIC RESULTS   EKG:All EKG's are interpreted by the Emergency Department Physician who either signs or Co-signs this chart in the absence of a cardiologist.        RADIOLOGY:All plain film, CT, MRI, and formal ultrasound images (except ED bedside ultrasound) are read by the radiologist, see reports below, unless otherwisenoted in MDM or here.   No orders to display     LABS: All lab results were reviewed by myself, and all abnormals are listed below.   Labs Reviewed   CBC WITH AUTO DIFFERENTIAL - Abnormal; Notable for the following components:       Result Value    Seg Neutrophils 67 (*)     Lymphocytes 23 (*)     All other components within normal limits   COMPREHENSIVE METABOLIC PANEL - Abnormal; Notable for the following components:    Glucose 108 (*)     Potassium 3.5 (*)     All other components within normal limits   SALICYLATE LEVEL - Abnormal; Notable for the following components:    Salicylate Lvl <1 (*)     All other components within normal limits   ACETAMINOPHEN LEVEL - Abnormal; Notable for the following components:    Acetaminophen Level <5 (*)     All other components within normal limits   URINE DRUG SCREEN - Abnormal; Notable for the following components:    Cocaine Metabolite, Urine POSITIVE (*)     Cannabinoid Scrn, Ur POSITIVE (*)     Fentanyl, Ur POSITIVE (*)     All other components within normal limits   PREGNANCY, URINE   ETHANOL   MAGNESIUM       EMERGENCY DEPARTMENTCOURSE:         Vitals:    Vitals:    08/01/22 2035 08/01/22 2330 08/01/22 2333 08/02/22 0854   BP: 120/86  107/67 109/80   Pulse: 78  90 65   Resp: 18  14 14   Temp: 98.6 °F (37 °C)  97.9 °F (36.6 °C) 97.7 °F (36.5 °C)   TempSrc:   Temporal    SpO2: 98%  99%    Weight:  145 lb (65.8 kg)     Height:  5' 6\" (1.676 m)         The patient was given the following medications while in the emergency department:  Orders Placed This Encounter   Medications    acetaminophen (TYLENOL) tablet 650 mg    ibuprofen (ADVIL;MOTRIN) tablet 400 mg    hydrOXYzine HCl (ATARAX) tablet 50 mg    DISCONTD: traZODone (DESYREL) tablet 50 mg    polyethylene glycol (GLYCOLAX) packet 17 g    aluminum & magnesium hydroxide-simethicone (MAALOX) 200-200-20 MG/5ML suspension 30 mL    nicotine polacrilex (NICORETTE) gum 2 mg    AND Linked Order Group     haloperidol lactate (HALDOL) injection 5 mg     LORazepam (ATIVAN) injection 2 mg     diphenhydrAMINE (BENADRYL) injection 50 mg AND Linked Order Group     haloperidol (HALDOL) tablet 5 mg     LORazepam (ATIVAN) tablet 2 mg    traZODone (DESYREL) tablet 50 mg    FLUoxetine (PROZAC) capsule 10 mg    OLANZapine (ZYPREXA) tablet 5 mg     CONSULTS:  IP CONSULT TO INTERNAL MEDICINE    FINAL IMPRESSION      1. Depression with suicidal ideation          DISPOSITION/PLAN   DISPOSITION Admitted 08/01/2022 10:53:33 PM      PATIENT REFERRED TO:  No follow-up provider specified. DISCHARGE MEDICATIONS:  Current Discharge Medication List        The care is provided during an unprecedented national emergency due to the novel coronavirus, COVID 19.   [address], DO                     [address], DO  08/02/22 1537
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
## 2022-08-02 NOTE — GROUP NOTE
Group Therapy Note    Date: 8/2/2022    Group Start Time: 1000  Group End Time: 5482  Group Topic: Psychotherapy    [facility]    MARION Goodman LSW        Group Therapy Note    Attendees:4/21       Patient refused to attend psychotherapy group at 10:00 am after encouragement from staff. 1:1 talk time provided as alternative to group session.   Discipline Responsible: /Counselor      Signature:  MARION Goodman LSW

## 2022-08-02 NOTE — PLAN OF CARE
Problem: Coping  Goal: Pt/Family able to verbalize concerns and demonstrate effective coping strategies  Outcome: Progressing     Problem: Depression/Self Harm  Goal: Effect of psychiatric condition will be minimized and patient will be protected from self harm  Outcome: Progressing     Problem: Anxiety  Goal: Will report anxiety at manageable levels  Outcome: Progressing     Pt is calm, controlled upon approach, cooperative with treatment. Pt attends groups, is social and pleasant within the milieu. Pt is med compliant, completes ADLs without prompting, and reports good sleep patterns as well as a good appetite. Denies SI, HI, AVH at this time. Q15min checks maintained for safety.

## 2022-08-02 NOTE — BH NOTE
Patient given tobacco quitline number 64042594696 at this time, refusing to call at this time, states \" I just dont want to quit now\"- patient given information as to the dangers of long term tobacco use. Continue to reinforce the importance of tobacco cessation.
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
## 2022-08-02 NOTE — H&P
Department of Psychiatry  Attending Physician Psychiatric Assessment     Reason for Admission to Psychiatric Unit:  Concerns about patient's safety in the community    CHIEF COMPLAINT:  Depression with suicidal ideation    History obtained from: Patient, electronic medical record          HISTORY OF PRESENT ILLNESS:    Daksha Rudolph is a 40 y.o. female who has a past medical history of mental illness, spinal stenosis, and hypokalemia who presents to the ER with increased depression and suicidal ideation with plan. Patient reported that she would not feel safe to go home for fear that she would hurt herself. Alen Hu is agreeable to interview at bedside today. She reports that she has been off of her medications for \"a long time. \"  She reports that lately she has been dealing with a lot of \"bullshit and drama and I just want to get away from all of it. \"  She also states that her grandmother recently passed away and she was not able to go to the .  She reports she has a \"problem\" with crack-cocaine use and is interested in receiving help for this. She states that yesterday she was feeling increasingly \"overwhelmed\" and just did not want to deal with it anymore so became suicidal with a plan that she will not disclose to this writer. Alen Hu reports that for the past 2 weeks she has been increasingly depressed, all day, nearly everyday. She endorses trouble falling asleep and staying asleep at night and states that she struggles with \"insomnia. \"  She endorses significant anhedonia, poor energy and concentration, decreased motivation and poor appetite. She endorses significant feelings of hopelessness and helplessness. She endorses suicidal ideation however will not disclose a plan to this writer. She denies homicidal ideation. She reports that she would feel unsafe out of the hospital at this time due to her suicidal thoughts and feelings of hopelessness and helplessness.  Alen Hu does have a reported 2 x 2011 post partum hemorrhage & SAB    DILATION AND CURETTAGE OF UTERUS      DILATION AND CURETTAGE OF UTERUS      x3    HERNIA REPAIR      HERNIA REPAIR  2011    HERNIA REPAIR      x2    MANDIBLE FRACTURE SURGERY      MANDIBLE SURGERY      UPPER GASTROINTESTINAL ENDOSCOPY         Allergies:  Dilaudid [hydromorphone hcl]         Social History:     Born in: [place], [place] but raised in   Family: Raised by her mother until the age of 6 when her mother \"kicked me out and I was raised by my 15year old cousin. \"  Reports having 4 biological sibilings and 3 step-siblings and denies being close to any of them   Highest Level of Education: GED  Occupation: Unemployed, receives SSDI  Marital Status:   Children: 4 children whom she does not see or have custody of  Residence: Lives in a house with roomates and boyfriend but wants to leave  Stressors: Chronic mental illness, untreated, substance abuse  Patient Assets/Supportive Factors: patient is seeking additional support         DRUG USE HISTORY  Social History     Tobacco Use   Smoking Status Every Day    Packs/day: 0.50    Years: 11.00    Pack years: 5.50    Types: Cigarettes    Start date: 2000   Smokeless Tobacco Never     Social History     Substance and Sexual Activity   Alcohol Use No    Comment: on occ     Social History     Substance and Sexual Activity   Drug Use Yes    Types: Marijuana ([place])       Chalino Lion endorses crack-cocaine use daily. She states she has been using crack-cocaine \"on and off\" for 11 years. She also endorses daily marijuana use. Patient is positive for cocaine, cannabis and fentanyl upon admission. Patient adamantly denies opioid use and does not know why her urine is positive for fentanyl. She denies alcohol use.           LEGAL HISTORY:   HISTORY OF INCARCERATION: [x] Yes [] No    Family History:       Problem Relation Age of Onset    Cancer Maternal Grandmother     Diabetes Maternal Grandmother     Diabetes Maternal Grandfather Hypertension Mother     Breast Cancer Neg Hx     Colon Cancer Neg Hx     Eclampsia Neg Hx     Ovarian Cancer Neg Hx      Labor Neg Hx     Spont Abortions Neg Hx     Stroke Neg Hx        Psychiatric Family History    Patient endorses psychiatric family history. Suicides in family: [] Yes [x] No    Substance use in family: [x] Yes [] No         PHYSICAL EXAM:  Vitals:  /80   Pulse 65   Temp 97.7 °F (36.5 °C)   Resp 14   Ht 5' 6\" (1.676 m)   Wt 145 lb (65.8 kg)   LMP  (LMP Unknown)   SpO2 99%   BMI 23.40 kg/m²     Pain Level: Denies    LABS:  Labs reviewed: [x] Yes  Last EKG in EMR reviewed: [x] Yes  QTc: 444          Review of Systems   Constitutional: Negative for chills and weight loss. HENT: Negative for ear pain and nosebleeds. Eyes: Negative for blurred vision and photophobia. Respiratory: Negative for cough, shortness of breath and wheezing. Cardiovascular: Negative for chest pain and palpitations. Gastrointestinal: Negative for abdominal pain, diarrhea and vomiting. Genitourinary: Negative for dysuria and urgency. Musculoskeletal: Negative for falls and joint pain. Skin: Negative for itching and rash. Neurological: Negative for tremors, seizures and weakness. Endo/Heme/Allergies: Does not bruise/bleed easily. Physical Exam:   Constitutional:  Appears well-developed and well-nourished, no acute distress. HENT:   Head: Normocephalic and atraumatic. Eyes: Conjunctivae are normal. Right eye exhibits no discharge. Left eye exhibits no discharge. No scleral icterus. Neck: Normal range of motion. Neck supple. Pulmonary/Chest:  No respiratory distress or accessory muscle use, no wheezing. Cardiac: Regular rate and rhythm. Abdominal: Soft. Non-tender. Exhibits no distension. Musculoskeletal: Normal range of motion. Exhibits no edema. Neurological: cranial nerves II-XII grossly in tact, normal gait and station. Skin: Skin is warm and dry.  Patient is not diaphoretic. No erythema. Mental Status Examination:    Level of consciousness: Awake and alert  Appearance:  Appropriate attire, resting in bed, poor grooming and hygiene  Behavior/Motor: Approachable, restless  Attitude toward examiner:  Cooperative, attentive, good eye contact  Speech: Normal rate, volume, and tone. Mood: Depressed  Affect: Mood-congruent  Thought processes: Linear and coherent  Thought content: Active suicidal ideations, with a  current plan or intent, contracts for safety on the unit. Denies homicidal ideations               Denies visual hallucinations. Denies auditory hallucinations. Denies delusions              Denies paranoia  Cognition:  Oriented to self, location, time, situation  Concentration: Clinically adequate  Memory: Intact  Insight &Judgment: Poor         DSM-5 Diagnosis    Principal Problem: Bipolar affective disorder, depressed, severe (HCC)    Stimulant Use Disorder (Cocaine)  Cannabis Use Disorder  VESNA  Borderline Personality Disorder    Psychosocial and Contextual factors:  Financial: Endorses  Occupational: Denies  Relationship: Denies  Legal: Denies  Living situation: Endorses  Educational: Denies    Past Medical History:   Diagnosis Date    ADHD     Allergy     dilaudid    Aspiration pneumonia ([address] 12/27/2021    Bipolar 1 disorder ([address]     Bipolar affective disorder, depressed, severe ([address] 12/30/2021    Bipolar affective disorder, depressed, severe ([address] 12/30/2021    Bronchitis, chronic ([address]     Cocaine abuse with intoxication with complication ([address] 0/5/1113    Depression with suicidal ideation 12/29/2021    Osteoarthritis     Osteoarthritis     Rh negative state in antepartum period 5/8/2012    Schizophrenia ([address]     Seizures ([address] 7/5/2016    Spinal stenosis         TREATMENT CONSIDERATIONS    Continue inpatient psychiatric treatment. Home medications reviewed.    Start   Prozac 10 mg daily  Zyprexa 5 mg nightly  Problem list updated. Monitor need and frequency of PRN medications. Attempt to develop insight. Follow-up daily while inpatient. Reviewed risks and benefits as well as potential side effects with patient. CONSULTS [x] Yes [] No  Internal Medicine for Medical H&P    Risk Management: close watch per standard protocol      Psychotherapy: participation in milieu and group and individual sessions with Attending Physician,  and Physician Assistant/CNP      Estimated length of stay:  2-14 days      GENERAL PATIENT/FAMILY EDUCATION  Patient will understand basic signs and symptoms, patient will understand benefits/risks and potential side effects from proposed medications, and patient will understand their role in recovery. Family is not active in patient's care. Patient assets that may be helpful during treatment include: Intent to participate and engage in treatment, sufficient fund of knowledge and intellect to understand and utilize treatments. Goals:    1) Remission of depression with suicidal ideation. 2) Stabilization of symptoms prior to discharge. 3) Establish efficacy and tolerability of medications. Behavioral Services  Medicare Certification     Admission Day 1  I certify that this patient's inpatient psychiatric hospital admission is medically necessary for:    x (1) treatment which could reasonably be expected to improve this patient's condition, or    x (2) diagnostic study or its equivalent. Time Spent: 60 minutes    Candance Diamond is a 40 y.o. female being evaluated face to face    --ADELINA Hudson CNP on 8/2/2022 at 12:32 PM    An electronic signature was used to authenticate this note. I independently saw and evaluated the patient. I reviewed the nurse practitioners documentation above. Any additional comments or changes to the nurse practitioners documentation are stated below otherwise agree with assessment.   Plan will be as follows:  Patient with bipolar

## 2022-08-02 NOTE — PROGRESS NOTES
Pharmacy Medication History Note      List of current medications patient is taking is complete. Source of information: [facility] [address]), [facility]    Changes made to medication list:  Medications removed (include reason, ex. therapy complete or physician discontinued, noncompliance):  Prednisone (therapy completed), Ibuprofen (therapy completed), Methocarbamol (therapy completed), Etodolac (therapy completed), Acetaminophen (therapy completed), Sertraline (list clean up), Prazosin (list clean up), Abilify (list clean up), Hydrocortisone (list clean up)    Medications added/doses adjusted: Added Cariprazine 1.5 mg daily  Added Doxepin 25 mg nightly  Added Oxcarbazepine 150 mg twice daily    Other notes (ex. Recent course of antibiotics, Coumadin dosing): The patient has not filled her medications sine the end of May 2022. Current Home Medication List at Time of Admission:  Prior to Admission medications    Medication Sig   cariprazine hcl (VRAYLAR) 1.5 MG capsule Take 1.5 mg by mouth in the morning. doxepin (SINEQUAN) 25 MG capsule Take 25 mg by mouth nightly   OXcarbazepine (TRILEPTAL) 150 MG tablet Take 150 mg by mouth in the morning and 150 mg before bedtime. polyethylene glycol (MIRALAX) 17 GM/SCOOP powder Take 17 g by mouth daily   albuterol sulfate HFA (PROVENTIL HFA) 108 (90 Base) MCG/ACT inhaler Inhale 2 puffs into the lungs every 6 hours as needed for Wheezing or Shortness of Breath         Please let me know if you have any questions about this encounter. Thank you!     Electronically signed by Benton Da Silva [facility] on 8/2/2022 at 8:28 AM

## 2022-08-02 NOTE — ED NOTES
Mode of arrival (squad #, walk in, police, etc) : Walk in        Chief complaint(s): Mental health problem        Arrival Note (brief scenario, treatment PTA, etc). : Patient states that she doesn't want to live anymore and is off her medication        C= \"Have you ever felt that you should Cut down on your drinking? \"  No  A= \"Have people Annoyed you by criticizing your drinking? \"  No  G= \"Have you ever felt bad or Guilty about your drinking? \"  No  E= \"Have you ever had a drink as an Eye-opener first thing in the morning to steady your nerves or to help a hangover? \"  No      Deferred []      Reason for deferring: N/A    *If yes to two or more: probable alcohol abuse. Marlena Post RN  08/01/22 2033

## 2022-08-02 NOTE — PLAN OF CARE
[facility]  Initial Interdisciplinary Treatment Plan NO      Original treatment plan Date & Time: 8/2/2022 1327    Admission Type:  Admission Type: Voluntary    Reason for admission:   Reason for Admission: Suicidal ideation. Patient reports increased depression, hopelessness, and helpelessness related to being off of her medications. Patient had no plan but intent.     Estimated Length of Stay:  5-7days  Estimated Discharge Date: to be determined by physician    PATIENT STRENGTHS:  Patient Strengths:   Patient Strengths and Limitations:   Addictive Behavior: Addictive Behavior  In the Past 3 Months, Have You Felt or Has Someone Told You That You Have a Problem With  : None  Medical Problems:  Past Medical History:   Diagnosis Date    ADHD     Allergy     dilaudid    Aspiration pneumonia ([address] 12/27/2021    Bipolar 1 disorder ([address]     Bipolar affective disorder, depressed, severe ([address] 12/30/2021    Bipolar affective disorder, depressed, severe ([address] 12/30/2021    Bronchitis, chronic ([address]     Cocaine abuse with intoxication with complication ([address] 2/5/2834    Depression with suicidal ideation 12/29/2021    Osteoarthritis     Osteoarthritis     Rh negative state in antepartum period 5/8/2012    Schizophrenia ([address]     Seizures ([address] 7/5/2016    Spinal stenosis      Status EXAM:Mental Status and Behavioral Exam  Normal: No  Level of Assistance: Independent/Self  Facial Expression: Avoids Gaze, Flat, Sad  Affect: Constricted  Level of Consciousness: Alert  Frequency of Checks: 4 times per hour, close  Mood:Normal: No  Mood: Depressed, Anxious, Worthless, low self-esteem  Motor Activity:Normal: No  Motor Activity: Decreased  Eye Contact: Poor  Observed Behavior: Cooperative, Guarded  Sexual Misconduct History: Current - no  Preception: [place] to person, [place] to time, [place] to place, [place] to situation  Attention:Normal: No  Attention: Unable to concentrate  Thought Processes: Other (comment) (logical)  Thought Content:Normal: No  Thought Content: Preoccupations  Depression Symptoms: Feelings of hopelessess, Feelings of worthlessness, Feelings of helplessness  Anxiety Symptoms: Generalized  Darcy Symptoms: No problems reported or observed.   Hallucinations: None  Delusions: No  Memory:Normal: No  Memory: Poor recent  Insight and Judgment: No  Insight and Judgment: Poor judgment    EDUCATION:   Learner Progress Toward Treatment Goals: reviewed group plans and strategies for care    Method:group therapy, medication compliance, individualized assessments and care planning    Outcome: needs reinforcement    PATIENT GOALS:   Short term: pt refused to participate  Long term: pt refused to participate    PLAN/TREATMENT RECOMMENDATIONS:     continue group therapy , medications compliance, goal setting, individualized assessments and care, continue to monitor pt on unit      SHORT-TERM GOALS:   Time frame for Short-Term Goals: 5-7 days    LONG-TERM GOALS:  Time frame for Long-Term Goals: 6 months  Members Present in Team Meeting: See Signature Sheet    Jyoti Godfrey RN to hold vanco 6 am dose and call ID  in am

## 2022-08-02 NOTE — H&P
[facility] Internal Medicine  Danni Ying MD; Steve Busby MD; Gonzalo Renteria MD; MD Edilma Pineda MD; MD [facility]   [address] / HISTORY AND PHYSICAL EXAMINATION            Date:   8/2/2022  Patient name:  Nga Maot  Date of admission:  8/1/2022  8:29 PM  MRN:   [id number]  Account:  [de-identified]  YOB: 1985  PCP:    No primary care provider on file.   Room:   [address] [place] [address]  Code Status:    Full Code    Physician Requesting Consult: Elvia Andrews MD    Reason for Consult: Medical management    Chief Complaint:     Chief Complaint   Patient presents with    Mental Health Problem   Suicidal ideations    History Obtained From:     patient    History of Present Illness:     67-year-old female with underlying history of anxiety, depression, bipolar disorder, polysubstance abuse, osteoarthritis, schizophrenia, history of seizures, not on anticonvulsants, admitted to inpatient psych for suicidal ideations    Past Medical History:     Past Medical History:   Diagnosis Date    ADHD     Allergy     dilaudid    Aspiration pneumonia ([address] 12/27/2021    Bipolar 1 disorder ([address]     Bipolar affective disorder, depressed, severe ([address] 12/30/2021    Bipolar affective disorder, depressed, severe ([address] 12/30/2021    Bronchitis, chronic ([address]     Cocaine abuse with intoxication with complication ([address] 8/4/3240    Depression with suicidal ideation 12/29/2021    Intermittent asthma without complication 0/0/2041    Osteoarthritis     Osteoarthritis     Rh negative state in antepartum period 5/8/2012    Schizophrenia ([address]     Seizures ([address] 7/5/2016    Spinal stenosis         Past Surgical History:     Past Surgical History:   Procedure Laterality Date    CHOLECYSTECTOMY      DILATION AND CURETTAGE      2 x 2011 post partum hemorrhage & SAB    DILATION AND CURETTAGE OF UTERUS DILATION AND CURETTAGE OF UTERUS      x3    HERNIA REPAIR      HERNIA REPAIR  2011    HERNIA REPAIR      x2    MANDIBLE FRACTURE SURGERY      MANDIBLE SURGERY      UPPER GASTROINTESTINAL ENDOSCOPY          Medications Prior to Admission:     Prior to Admission medications    Medication Sig Start Date End Date Taking? Authorizing Provider   cariprazine hcl (VRAYLAR) 1.5 MG capsule Take 1.5 mg by mouth in the morning. Yes Historical Provider, MD   doxepin (SINEQUAN) 25 MG capsule Take 25 mg by mouth nightly   Yes Historical Provider, MD   OXcarbazepine (TRILEPTAL) 150 MG tablet Take 150 mg by mouth in the morning and 150 mg before bedtime. Yes Historical Provider, MD   polyethylene glycol (MIRALAX) 17 GM/SCOOP powder Take 17 g by mouth daily 22   ADELINA Bishop CNP   albuterol sulfate HFA (PROVENTIL HFA) 108 (90 Base) MCG/ACT inhaler Inhale 2 puffs into the lungs every 6 hours as needed for Wheezing or Shortness of Breath 22   ADELINA Bishop CNP        Allergies:     Dilaudid [hydromorphone hcl]    Social History:     Tobacco:    reports that she has been smoking cigarettes. She started smoking about 22 years ago. She has a 5.50 pack-year smoking history. She has never used smokeless tobacco.  Alcohol:      reports no history of alcohol use. Drug Use:  reports current drug use. Drug: Marijuana [employer]). Family History:     Family History   Problem Relation Age of Onset    Cancer Maternal Grandmother     Diabetes Maternal Grandmother     Diabetes Maternal Grandfather     Hypertension Mother     Breast Cancer Neg Hx     Colon Cancer Neg Hx     Eclampsia Neg Hx     Ovarian Cancer Neg Hx      Labor Neg Hx     Spont Abortions Neg Hx     Stroke Neg Hx        Review of Systems:     Positive and Negative as described in HPI.     CONSTITUTIONAL:  negative for fevers, chills, sweats, fatigue, weight loss  HEENT:  negative for vision, hearing changes, runny nose, throat pain  RESPIRATORY:  negative for shortness of breath, cough, congestion, wheezing. CARDIOVASCULAR:  negative for chest pain, palpitations. GASTROINTESTINAL:  negative for nausea, vomiting, diarrhea, constipation, change in bowel habits, abdominal pain   GENITOURINARY:  negative for difficulty of urination, burning with urination, frequency   INTEGUMENT:  negative for rash, skin lesions, easy bruising   HEMATOLOGIC/LYMPHATIC:  negative for swelling/edema   ALLERGIC/IMMUNOLOGIC:  negative for urticaria , itching  ENDOCRINE:  negative increase in drinking, increase in urination, hot or cold intolerance  MUSCULOSKELETAL:  negative joint pains, muscle aches, swelling of joints  NEUROLOGICAL:  negative for headaches, dizziness, lightheadedness, numbness, pain, tingling extremities    Physical Exam:     /80   Pulse 65   Temp 97.7 °F (36.5 °C)   Resp 14   Ht 5' 6\" (1.676 m)   Wt 145 lb (65.8 kg)   LMP  (LMP Unknown)   SpO2 99%   BMI 23.40 kg/m²   Temp (24hrs), Av.1 °F (36.7 °C), Min:97.7 °F (36.5 °C), Max:98.6 °F (37 °C)    No results for input(s): POCGLU in the last 72 hours. No intake or output data in the 24 hours ending 22 1648    General Appearance:  alert, well appearing, and in no acute distress  Mental status: oriented to person, place, and time with normal affect  Head:  normocephalic, atraumatic. Eye: no icterus, redness, pupils equal and reactive, extraocular eye movements intact, conjunctiva clear  Ear: normal external ear, no discharge, hearing intact  Nose:  no drainage noted  Mouth: mucous membranes moist  Neck: supple, no carotid bruits, thyroid not palpable  Lungs: Bilateral equal air entry, clear to ausculation, no wheezing, rales or rhonchi, normal effort  Cardiovascular: normal rate, regular rhythm, no murmur, gallop, rub.   Abdomen: Soft, nontender, nondistended, normal bowel sounds, no hepatomegaly or splenomegaly  Neurologic: There are no new focal motor or sensory deficits, normal muscle tone and bulk, no abnormal sensation, normal speech, cranial nerves II through XII grossly intact  Skin: No gross lesions, rashes, bruising or bleeding on exposed skin area  Extremities:  peripheral pulses palpable, no pedal edema or calf pain with palpation      Investigations:      Laboratory Testing:  Recent Results (from the past 24 hour(s))   CBC with Auto Differential    Collection Time: 08/01/22  9:04 PM   Result Value Ref Range    WBC 10.8 3.5 - 11.0 k/uL    RBC 4.54 4.0 - 5.2 m/uL    Hemoglobin 14.2 12.0 - 16.0 g/dL    Hematocrit 41.5 36 - 46 %    MCV 91.4 80 - 100 fL    MCH 31.3 26 - 34 pg    MCHC 34.2 31 - 37 g/dL    RDW 13.6 11.5 - 14.9 %    Platelets 486 770 - 025 k/uL    MPV 7.2 6.0 - 12.0 fL    Seg Neutrophils 67 (H) 36 - 66 %    Lymphocytes 23 (L) 24 - 44 %    Monocytes 7 1 - 7 %    Eosinophils % 2 0 - 4 %    Basophils 1 0 - 2 %    Segs Absolute 7.30 1.3 - 9.1 k/uL    Absolute Lymph # 2.50 1.0 - 4.8 k/uL    Absolute Mono # 0.70 0.1 - 1.3 k/uL    Absolute Eos # 0.20 0.0 - 0.4 k/uL    Basophils Absolute 0.10 0.0 - 0.2 k/uL   CMP    Collection Time: 08/01/22  9:04 PM   Result Value Ref Range    Glucose 108 (H) 70 - 99 mg/dL    BUN 13 6 - 20 mg/dL    Creatinine 0.79 0.50 - 0.90 mg/dL    Calcium 9.4 8.6 - 10.4 mg/dL    Sodium 137 135 - 144 mmol/L    Potassium 3.5 (L) 3.7 - 5.3 mmol/L    Chloride 101 98 - 107 mmol/L    CO2 24 20 - 31 mmol/L    Anion Gap 12 9 - 17 mmol/L    Alkaline Phosphatase 65 35 - 104 U/L    ALT 16 5 - 33 U/L    AST 19 <32 U/L    Total Bilirubin 0.45 0.3 - 1.2 mg/dL    Total Protein 7.0 6.4 - 8.3 g/dL    Albumin 4.3 3.5 - 5.2 g/dL    GFR Non-African American >60 >60 mL/min    GFR African American >60 >60 mL/min    GFR Comment         Salicylate    Collection Time: 08/01/22  9:04 PM   Result Value Ref Range    Salicylate Lvl <1 (L) 3 - 10 mg/dL   Acetaminophen Level    Collection Time: 08/01/22  9:04 PM   Result Value Ref Range    Acetaminophen Level <5 (L) 10 - 30 ug/mL ETOH    Collection Time: 08/01/22  9:04 PM   Result Value Ref Range    Ethanol <10 <10 mg/dL    Ethanol percent <0.010 %   Magnesium    Collection Time: 08/01/22  9:04 PM   Result Value Ref Range    Magnesium 2.0 1.6 - 2.6 mg/dL   Urine Preg (Lab)    Collection Time: 08/01/22  9:56 PM   Result Value Ref Range    HCG(Urine) Pregnancy Test NEGATIVE NEGATIVE   Urine Drug Screen    Collection Time: 08/01/22  9:56 PM   Result Value Ref Range    Amphetamine Screen, Ur NEGATIVE NEGATIVE    Barbiturate Screen, Ur NEGATIVE NEGATIVE    Benzodiazepine Screen, Urine NEGATIVE NEGATIVE    Cocaine Metabolite, Urine POSITIVE (A) NEGATIVE    Methadone Screen, Urine NEGATIVE NEGATIVE    Opiates, Urine NEGATIVE NEGATIVE    Phencyclidine, Urine NEGATIVE NEGATIVE    Cannabinoid Scrn, Ur POSITIVE (A) NEGATIVE    Oxycodone Screen, Ur NEGATIVE NEGATIVE    Fentanyl, Ur POSITIVE (A) NEGATIVE    Test Information       Assay provides medical screening only. The absence of expected drug(s) and/or metabolite(s) may indicate diluted or adulterated urine, limitations of testing or timing of collection. Imaging/Diagonstics:  No results found.     Assessment :      Hospital Problems             Last Modified POA    * (Principal) Bipolar affective disorder, depressed, severe ([address] 8/2/2022 Yes    Abuse of smoked substance ([address] 8/2/2022 Yes    Polysubstance abuse ([address] 8/2/2022 Yes    Intermittent asthma without complication 6/0/9231 Yes    Schizophrenia ([address] (Chronic) 8/2/2022 Yes    Cocaine abuse in remission ([address] 8/2/2022 Yes    Marijuana use 8/2/2022 Yes    Chronic pain syndrome 8/2/2022 Yes    Hypokalemia 8/2/2022 Yes       Plan:     76-year-old female with underlying history of anxiety and depression admitted to inpatient psych for suicidal ideations  Current smoker, counseling done to quit smoking, nicotine patch offered  Polysubstance abuse, watch for withdrawal  Asthma intermittent, continue inhalers  Cocaine abuse, watch for any chest pain  Chronic pain syndrome, continue ibuprofen  Hypokalemia, replace potassium and recheck  DVT prophylaxis, patient mobile  Full CODE STATUS    Consultations:   Claude Zhao MD  8/2/2022  4:48 PM    Copy sent to Dr. Augustine Child primary care provider on file. Please note that this chart was generated using voice recognition Dragon dictation software. Although every effort was made to ensure the accuracy of this automated transcription, some errors in transcription may have occurred.

## 2022-08-02 NOTE — GROUP NOTE
Group Therapy Note    Date: 8/2/2022    Group Start Time: 1100  Group End Time: 4518  Group Topic: Psychoeducation    LORENZO Valentine    Psych-Ed/Relapse Prevention Group Note        Date: August 2, 2022 Start Time: 11am  End Time: 11:45am      Number of Participants in Group & Unit Census:  4    Topic: Self-Care list     Goal of Group:Patient will identify multiple forms of healthy  and safe self care. Comments:     Patient did not participate in Psych-Ed/Relapse Prevention group, despite staff encouragement and explanation of benefits. Patient remain seclusive to self. Q15 minute safety checks maintained for patient safety and will continue to encourage patient to attend unit programming.           Signature:  Soco Vega, [address]

## 2022-08-03 PROCEDURE — 99232 SBSQ HOSP IP/OBS MODERATE 35: CPT | Performed by: INTERNAL MEDICINE

## 2022-08-03 PROCEDURE — 6370000000 HC RX 637 (ALT 250 FOR IP)

## 2022-08-03 PROCEDURE — APPSS30 APP SPLIT SHARED TIME 16-30 MINUTES: Performed by: NURSE PRACTITIONER

## 2022-08-03 PROCEDURE — 6370000000 HC RX 637 (ALT 250 FOR IP): Performed by: PSYCHIATRY & NEUROLOGY

## 2022-08-03 PROCEDURE — 99232 SBSQ HOSP IP/OBS MODERATE 35: CPT | Performed by: PSYCHIATRY & NEUROLOGY

## 2022-08-03 PROCEDURE — 6370000000 HC RX 637 (ALT 250 FOR IP): Performed by: INTERNAL MEDICINE

## 2022-08-03 PROCEDURE — 1240000000 HC EMOTIONAL WELLNESS R&B

## 2022-08-03 RX ORDER — OLANZAPINE 10 MG/1
10 TABLET ORAL NIGHTLY
Status: DISCONTINUED | OUTPATIENT
Start: 2022-08-03 | End: 2022-08-05 | Stop reason: HOSPADM

## 2022-08-03 RX ADMIN — OLANZAPINE 10 MG: 5 TABLET, FILM COATED ORAL at 22:05

## 2022-08-03 RX ADMIN — IBUPROFEN 400 MG: 400 TABLET ORAL at 17:46

## 2022-08-03 RX ADMIN — HYDROXYZINE HYDROCHLORIDE 50 MG: 50 TABLET, FILM COATED ORAL at 17:46

## 2022-08-03 RX ADMIN — ACETAMINOPHEN 650 MG: 325 TABLET, FILM COATED ORAL at 14:18

## 2022-08-03 RX ADMIN — FLUOXETINE 10 MG: 10 CAPSULE ORAL at 11:56

## 2022-08-03 RX ADMIN — TRAZODONE HYDROCHLORIDE 50 MG: 50 TABLET ORAL at 22:05

## 2022-08-03 RX ADMIN — HYDROXYZINE HYDROCHLORIDE 50 MG: 50 TABLET, FILM COATED ORAL at 22:05

## 2022-08-03 ASSESSMENT — PAIN DESCRIPTION - LOCATION
LOCATION: BACK
LOCATION: SHOULDER
LOCATION: BACK

## 2022-08-03 ASSESSMENT — PAIN SCALES - GENERAL
PAINLEVEL_OUTOF10: 6
PAINLEVEL_OUTOF10: 10
PAINLEVEL_OUTOF10: 0
PAINLEVEL_OUTOF10: 0
PAINLEVEL_OUTOF10: 3

## 2022-08-03 ASSESSMENT — PAIN DESCRIPTION - DESCRIPTORS
DESCRIPTORS: ACHING
DESCRIPTORS: ACHING

## 2022-08-03 ASSESSMENT — PAIN DESCRIPTION - ORIENTATION: ORIENTATION: RIGHT

## 2022-08-03 ASSESSMENT — PAIN - FUNCTIONAL ASSESSMENT: PAIN_FUNCTIONAL_ASSESSMENT: ACTIVITIES ARE NOT PREVENTED

## 2022-08-03 NOTE — PLAN OF CARE
Problem: Depression/Self Harm  Goal: Effect of psychiatric condition will be minimized and patient will be protected from self harm  Description: INTERVENTIONS:  1. Assess impact of patient's symptoms on level of functioning, self care needs and offer support as indicated  2. Assess patient/family knowledge of depression, impact on illness and need for teaching  3. Provide emotional support, presence and reassurance  4. Assess for possible suicidal thoughts or ideation. If patient expresses suicidal thoughts or statements do not leave alone, initiate Suicide Precautions, move to a room close to the nursing station and obtain sitter  5. Initiate consults as appropriate with Mental Health Professional, Spiritual Care, Psychosocial CNS, and consider a recommendation to the [facility] for a Psychiatric Consultation  8/2/2022 2342 by Stephany Mcdowell LPN  Outcome: Progressing     Problem: Anxiety  Goal: Will report anxiety at manageable levels  Description: INTERVENTIONS:  1. Administer medication as ordered  2. Teach and rehearse alternative coping skills  3. Provide emotional support with 1:1 interaction with staff  8/2/2022 2342 by Stephany Mcdowell LPN  Outcome: Progressing     Patient denies any thoughts or plans to harm herself or anyone else. Patient became anxious when another peer too a snack too many . Multiple staff members collectively redirected patient end the outburst. I will continue to monitor and provide for  a safe environment.

## 2022-08-03 NOTE — PROGRESS NOTES
[facility] Internal Medicine  Salas Junior MD; Ryan Goodrich MD; Mckenzie Kam MD; MD Jonathon Johns MD; Jerry Narvaez MD    [facility] Internal Medicine   [address] / HISTORY AND PHYSICAL EXAMINATION            Date:   8/3/2022  Patient name:  Donald Muro  Date of admission:  8/1/2022  8:29 PM  MRN:   [id number]  Account:  [de-identified]  YOB: 1985  PCP:    No primary care provider on file.   Room:   [address] [place] [address]  Code Status:    Full Code    Physician Requesting Consult: Dewayne Garcia MD    Reason for Consult: Medical management    Chief Complaint:     Chief Complaint   Patient presents with    Mental Health Problem   Suicidal ideations    History Obtained From:     patient    History of Present Illness:     43-year-old female with underlying history of anxiety, depression, bipolar disorder, polysubstance abuse, osteoarthritis, schizophrenia, history of seizures, not on anticonvulsants, admitted to inpatient psych for suicidal ideations    Past Medical History:     Past Medical History:   Diagnosis Date    ADHD     Allergy     dilaudid    Aspiration pneumonia ([address] 12/27/2021    Bipolar 1 disorder ([address]     Bipolar affective disorder, depressed, severe ([address] 12/30/2021    Bipolar affective disorder, depressed, severe ([address] 12/30/2021    Bronchitis, chronic ([address]     Cocaine abuse with intoxication with complication ([address] 8/9/3892    Depression with suicidal ideation 12/29/2021    Intermittent asthma without complication 4/3/7790    Osteoarthritis     Osteoarthritis     Rh negative state in antepartum period 5/8/2012    Schizophrenia ([address]     Seizures ([address] 7/5/2016    Spinal stenosis         Past Surgical History:     Past Surgical History:   Procedure Laterality Date    CHOLECYSTECTOMY      DILATION AND CURETTAGE      2 x 2011 post partum hemorrhage & SAB    DILATION AND CURETTAGE OF UTERUS DILATION AND CURETTAGE OF UTERUS      x3    HERNIA REPAIR      HERNIA REPAIR  2011    HERNIA REPAIR      x2    MANDIBLE FRACTURE SURGERY      MANDIBLE SURGERY      UPPER GASTROINTESTINAL ENDOSCOPY          Medications Prior to Admission:     Prior to Admission medications    Medication Sig Start Date End Date Taking? Authorizing Provider   cariprazine hcl (VRAYLAR) 1.5 MG capsule Take 1.5 mg by mouth in the morning. Yes Historical Provider, MD   doxepin (SINEQUAN) 25 MG capsule Take 25 mg by mouth nightly   Yes Historical Provider, MD   OXcarbazepine (TRILEPTAL) 150 MG tablet Take 150 mg by mouth in the morning and 150 mg before bedtime. Yes Historical Provider, MD   polyethylene glycol (MIRALAX) 17 GM/SCOOP powder Take 17 g by mouth daily 22   Tilmon Severance, APRN - CNP   albuterol sulfate HFA (PROVENTIL HFA) 108 (90 Base) MCG/ACT inhaler Inhale 2 puffs into the lungs every 6 hours as needed for Wheezing or Shortness of Breath 22   Tilmon Severance, APRN - CNP        Allergies:     Dilaudid [hydromorphone hcl]    Social History:     Tobacco:    reports that she has been smoking cigarettes. She started smoking about 22 years ago. She has a 5.50 pack-year smoking history. She has never used smokeless tobacco.  Alcohol:      reports no history of alcohol use. Drug Use:  reports current drug use. Drug: Marijuana [employer]). Family History:     Family History   Problem Relation Age of Onset    Cancer Maternal Grandmother     Diabetes Maternal Grandmother     Diabetes Maternal Grandfather     Hypertension Mother     Breast Cancer Neg Hx     Colon Cancer Neg Hx     Eclampsia Neg Hx     Ovarian Cancer Neg Hx      Labor Neg Hx     Spont Abortions Neg Hx     Stroke Neg Hx        Review of Systems:     Positive and Negative as described in HPI.     CONSTITUTIONAL:  negative for fevers, chills, sweats, fatigue, weight loss  HEENT:  negative for vision, hearing changes, runny nose, throat pain  RESPIRATORY:  negative for shortness of breath, cough, congestion, wheezing. CARDIOVASCULAR:  negative for chest pain, palpitations. GASTROINTESTINAL:  negative for nausea, vomiting, diarrhea, constipation, change in bowel habits, abdominal pain   GENITOURINARY:  negative for difficulty of urination, burning with urination, frequency   INTEGUMENT:  negative for rash, skin lesions, easy bruising   HEMATOLOGIC/LYMPHATIC:  negative for swelling/edema   ALLERGIC/IMMUNOLOGIC:  negative for urticaria , itching  ENDOCRINE:  negative increase in drinking, increase in urination, hot or cold intolerance  MUSCULOSKELETAL:  negative joint pains, muscle aches, swelling of joints  NEUROLOGICAL:  negative for headaches, dizziness, lightheadedness, numbness, pain, tingling extremities    Physical Exam:     BP 95/62   Pulse 61   Temp 97.6 °F (36.4 °C) (Oral)   Resp 14   Ht 5' 6\" (1.676 m)   Wt 145 lb (65.8 kg)   LMP  (LMP Unknown)   SpO2 99%   BMI 23.40 kg/m²   Temp (24hrs), Av.6 °F (36.4 °C), Min:97.6 °F (36.4 °C), Max:97.6 °F (36.4 °C)    No results for input(s): POCGLU in the last 72 hours. No intake or output data in the 24 hours ending 22    General Appearance:  alert, well appearing, and in no acute distress  Mental status: oriented to person, place, and time with normal affect  Head:  normocephalic, atraumatic. Eye: no icterus, redness, pupils equal and reactive, extraocular eye movements intact, conjunctiva clear  Ear: normal external ear, no discharge, hearing intact  Nose:  no drainage noted  Mouth: mucous membranes moist  Neck: supple, no carotid bruits, thyroid not palpable  Lungs: Bilateral equal air entry, clear to ausculation, no wheezing, rales or rhonchi, normal effort  Cardiovascular: normal rate, regular rhythm, no murmur, gallop, rub.   Abdomen: Soft, nontender, nondistended, normal bowel sounds, no hepatomegaly or splenomegaly  Neurologic: There are no new focal motor or sensory deficits, normal muscle tone and bulk, no abnormal sensation, normal speech, cranial nerves II through XII grossly intact  Skin: No gross lesions, rashes, bruising or bleeding on exposed skin area  Extremities:  peripheral pulses palpable, no pedal edema or calf pain with palpation      Investigations:      Laboratory Testing:  No results found for this or any previous visit (from the past 24 hour(s)). Imaging/Diagonstics:  No results found. Assessment :      Hospital Problems             Last Modified POA    * (Principal) Severe depressed bipolar I disorder without psychotic features ([address] 8/2/2022 Yes    Abuse of smoked substance ([address] 8/2/2022 Yes    Polysubstance abuse ([address] 8/2/2022 Yes    Intermittent asthma without complication 1/8/4390 Yes    Schizophrenia ([address] (Chronic) 8/2/2022 Yes    Cocaine abuse in remission ([address] 8/2/2022 Yes    Marijuana use 8/2/2022 Yes    Chronic pain syndrome 8/2/2022 Yes    Hypokalemia 8/2/2022 Yes     Plan:     71-year-old female with underlying history of anxiety and depression admitted to inpatient psych for suicidal ideations  Current smoker, counseling done to quit smoking, nicotine patch offered  Polysubstance abuse, watch for withdrawal  Asthma intermittent, continue inhalers  Cocaine abuse, watch for any chest pain  Chronic pain syndrome, continue ibuprofen  Hypokalemia, replace potassium and recheck  DVT prophylaxis, patient mobile  Full CODE STATUS      Recurrent right shoulder dislocation patient was able to reduce by herself  Advised for outpatient orthopedic eval      Consultations:   Mary Ko MD  8/3/2022  7:55 PM    Copy sent to Dr. Heaven Du primary care provider on file. Please note that this chart was generated using voice recognition Dragon dictation software. Although every effort was made to ensure the accuracy of this automated transcription, some errors in transcription may have occurred.

## 2022-08-03 NOTE — GROUP NOTE
Group Therapy Note    Date: 8/3/2022    Group Start Time: 0900  Group End Time: 0920  Group Topic: Community Meeting    IBETH Doll        Group Therapy Note    Attendees: 6/18          Goal of Group:Discuss daily goals       Comments:     Patient did not participate in [employer] group, despite staff encouragement and explanation of benefits. Patient remain seclusive to self. Q15 minute safety checks maintained for patient safety and will continue to encourage patient to attend unit programming.

## 2022-08-03 NOTE — GROUP NOTE
Group Therapy Note    Date: 8/3/2022    Group Start Time: 1330  Group End Time: 1400  Group Topic: Healthy Living/Wellness    IBETH Ruiz, CTRS    Health/Wellness Group Note        Date: August 3, 2022 Start Time: 1:30pm  End Time: 2:00 pm      Number of Participants in Group & Unit Census:  6    Topic: Walking Group     Goal of Group: Patient will utilize physical fitness for positive coping and healthy leisure. Comments:     Patient did not participate in Health/Wellness group, despite staff encouragement and explanation of benefits. Patient remain seclusive to self. Q15 minute safety checks maintained for patient safety and will continue to encourage patient to attend unit programming.         Signature:  Jojo Ruiz, [address]

## 2022-08-03 NOTE — GROUP NOTE
Group Therapy Note    Date: 8/3/2022    Group Start Time: 1100  Group End Time: 5631  Group Topic: Relaxation    LORENZO Smith      Relaxation Group Note        Date: August 3, 2022 Start Time: 11am  End Time: 11:45am      Number of Participants in Group & Unit Census:  8    Topic: Beading     Goal of Group:Patient will utilize creative expression for positive coping and relaxation. Comments:     Patient did not participate in Relaxation group, despite staff encouragement and explanation of benefits. Patient remain seclusive to self. Q15 minute safety checks maintained for patient safety and will continue to encourage patient to attend unit programming.           Signature:  Cathy Pitts, [address]

## 2022-08-03 NOTE — PROGRESS NOTES
Daily Progress Note  8/3/2022    Patient Name: Adonica Buerger    CHIEF COMPLAINT: Depression with suicidal ideation         SUBJECTIVE:      Patient is seen today for a follow up assessment. She approached this author when at the nurses station reporting that her \"shoulder popped out of socket\". She endorses a history of dislocated shoulders, she is specifically complaining today of her right shoulder. Upon entering her room when seated bedside she reports \"it popped back into place but I need an x-ray\". Staff reports that they did not witness her shoulder dislocated, only the subjective report. She is extremely discharge focused and they are concerned that she is an elopement risk. She reports that she is no longer depressed and begins to explain to this [place] that she was recently evicted from her home. She is very tearful throughout this discussion and anxious about speaking with the attending. She is minimizing of the events that led to hospitalization and suicidal statements. She has yet to show stability in symptoms since admission and would likely further benefit from inpatient hospitalization.     Appetite:  [x] Adequate/Unchanged  [] Increased  [] Decreased      Sleep:       [] Adequate/Unchanged  [x] Fair  [] Poor      Group Attendance on Unit:   [] Yes   [] Selectively    [x] No    Compliant with scheduled medications: [x] Yes  [] No    Received emergency medications in past 24 hrs: [] Yes   [x] No    Medication Side Effects: Denies          Mental Status Exam  Level of consciousness: Alert and awake   Appearance: Appropriate attire for setting, seated on bed, with poor grooming and hygiene   Behavior/Motor: Approachable, psychomotor agitation noted, restless  Attitude toward examiner: Somewhat cooperative, attentive, fair eye contact  Speech: Hyperverbal, loud volume, irritable tone  Mood: Anxious  Affect: Guarded, superficial, minimizing, nervous  Thought processes: Linear and coherent  Thought content: Discharge focused, Denies homicidal ideation  Suicidal Ideation: Reports improvement in suicidal ideations, contracts for safety on the unit. Delusions: No evidence of delusions. Perceptual Disturbance: Denies, patient does not appear to be responding to internal stimuli. Cognition: Oriented to self, location, time, and situation  Memory: intact  Insight: poor   Judgement: poor       Data   height is 5' 6\" (1.676 m) and weight is 145 lb (65.8 kg). Her oral temperature is 97.6 °F (36.4 °C). Her blood pressure is 95/62 and her pulse is 61. Her respiration is 14 and oxygen saturation is 99%. Labs:   Admission on 08/01/2022   Component Date Value Ref Range Status    HCG(Urine) Pregnancy Test 08/01/2022 NEGATIVE  NEGATIVE Final    Comment: Specimens with hCG levels near the threshold of the test (25 mIU/mL) may give a negative or   indeterminate result. In such cases, another test should be performed with a new specimen   in 48-72 hours. If early pregnancy is suspected clinically in this setting, correlation   with quantitative serum b-hCG level is suggested.       WBC 08/01/2022 10.8  3.5 - 11.0 k/uL Final    RBC 08/01/2022 4.54  4.0 - 5.2 m/uL Final    Hemoglobin 08/01/2022 14.2  12.0 - 16.0 g/dL Final    Hematocrit 08/01/2022 41.5  36 - 46 % Final    MCV 08/01/2022 91.4  80 - 100 fL Final    MCH 08/01/2022 31.3  26 - 34 pg Final    MCHC 08/01/2022 34.2  31 - 37 g/dL Final    RDW 08/01/2022 13.6  11.5 - 14.9 % Final    Platelets 60/42/3539 416  150 - 450 k/uL Final    MPV 08/01/2022 7.2  6.0 - 12.0 fL Final    Seg Neutrophils 08/01/2022 67 (A) 36 - 66 % Final    Lymphocytes 08/01/2022 23 (A) 24 - 44 % Final    Monocytes 08/01/2022 7  1 - 7 % Final    Eosinophils % 08/01/2022 2  0 - 4 % Final    Basophils 08/01/2022 1  0 - 2 % Final    Segs Absolute 08/01/2022 7.30  1.3 - 9.1 k/uL Final    Absolute Lymph # 08/01/2022 2.50  1.0 - 4.8 k/uL Final    Absolute Mono # 08/01/2022 0.70  0.1 - 1.3 k/uL Final    Absolute Eos # 08/01/2022 0.20  0.0 - 0.4 k/uL Final    Basophils Absolute 08/01/2022 0.10  0.0 - 0.2 k/uL Final    Glucose 08/01/2022 108 (A) 70 - 99 mg/dL Final    BUN 08/01/2022 13  6 - 20 mg/dL Final    Creatinine 08/01/2022 0.79  0.50 - 0.90 mg/dL Final    Calcium 08/01/2022 9.4  8.6 - 10.4 mg/dL Final    Sodium 08/01/2022 137  135 - 144 mmol/L Final    Potassium 08/01/2022 3.5 (A) 3.7 - 5.3 mmol/L Final    Chloride 08/01/2022 101  98 - 107 mmol/L Final    CO2 08/01/2022 24  20 - 31 mmol/L Final    Anion Gap 08/01/2022 12  9 - 17 mmol/L Final    Alkaline Phosphatase 08/01/2022 65  35 - 104 U/L Final    ALT 08/01/2022 16  5 - 33 U/L Final    AST 08/01/2022 19  <32 U/L Final    Total Bilirubin 08/01/2022 0.45  0.3 - 1.2 mg/dL Final    Total Protein 08/01/2022 7.0  6.4 - 8.3 g/dL Final    Albumin 08/01/2022 4.3  3.5 - 5.2 g/dL Final    GFR Non- 08/01/2022 >60  >60 mL/min Final    GFR  08/01/2022 >60  >60 mL/min Final    GFR Comment 08/01/2022        Final    Comment: Average GFR for 30-36 years old:   80 mL/min/1.73sq m  Chronic Kidney Disease:   <60 mL/min/1.73sq m  Kidney failure:   <15 mL/min/1.73sq m              eGFR calculated using average adult body mass.  Additional eGFR calculator available at:        [employer]            Salicylate Lvl 54/40/6440 <1 (A) 3 - 10 mg/dL Final    Acetaminophen Level 08/01/2022 <5 (A) 10 - 30 ug/mL Final    Ethanol 08/01/2022 <10  <10 mg/dL Final    Ethanol percent 08/01/2022 <0.010  % Final    Magnesium 08/01/2022 2.0  1.6 - 2.6 mg/dL Final    Amphetamine Screen, Ur 08/01/2022 NEGATIVE  NEGATIVE Final    Comment:       (Positive cutoff 1000 ng/mL)                  Barbiturate Screen, Ur 08/01/2022 NEGATIVE  NEGATIVE Final    Comment:       (Positive cutoff 200 ng/mL)                  Benzodiazepine Screen, Urine 08/01/2022 NEGATIVE  NEGATIVE Final    Comment:       (Positive cutoff 200 ng/mL)                  Cocaine Metabolite, Urine 08/01/2022 POSITIVE (A) NEGATIVE Final    Comment:       (Positive cutoff 300 ng/mL)                  Methadone Screen, Urine 08/01/2022 NEGATIVE  NEGATIVE Final    Comment:       (Positive cutoff 300 ng/mL)                  Opiates, Urine 08/01/2022 NEGATIVE  NEGATIVE Final    Comment:       (Positive cutoff 300 ng/mL)                  Phencyclidine, Urine 08/01/2022 NEGATIVE  NEGATIVE Final    Comment:       (Positive cutoff 25 ng/mL)                  Cannabinoid Scrn, Ur 08/01/2022 POSITIVE (A) NEGATIVE Final    Comment:       (Positive cutoff 50 ng/mL)                  Oxycodone Screen, Ur 08/01/2022 NEGATIVE  NEGATIVE Final    Comment:       (Positive cutoff 100 ng/mL)                  Fentanyl, Ur 08/01/2022 POSITIVE (A) NEGATIVE Final    Comment:       (Positive cutoff  5 ng/ml)            Test Information 08/01/2022 Assay provides medical screening only. The absence of expected drug(s) and/or metabolite(s) may indicate diluted or adulterated urine, limitations of testing or timing of collection. Final    Comment: Testing for legal purposes should be confirmed by another method. To request confirmation   of test result, please call the lab within 7 days of sample submission. Reviewed patient's current plan of care and vital signs with nursing staff.     Labs reviewed: [x] Yes    Medications  Current Facility-Administered Medications: FLUoxetine (PROZAC) capsule 10 mg, 10 mg, Oral, Daily  OLANZapine (ZYPREXA) tablet 5 mg, 5 mg, Oral, Nightly  lidocaine 4 % external patch 1 patch, 1 patch, TransDERmal, Daily  acetaminophen (TYLENOL) tablet 650 mg, 650 mg, Oral, Q6H PRN  ibuprofen (ADVIL;MOTRIN) tablet 400 mg, 400 mg, Oral, Q6H PRN  hydrOXYzine HCl (ATARAX) tablet 50 mg, 50 mg, Oral, TID PRN  polyethylene glycol (GLYCOLAX) packet 17 g, 17 g, Oral, Daily PRN  aluminum & magnesium hydroxide-simethicone (MAALOX) 200-200-20 MG/5ML suspension 30 mL, 30 mL, Oral, Q6H PRN  nicotine polacrilex (NICORETTE) gum 2 mg, 2 mg, Oral, Q2H PRN  haloperidol lactate (HALDOL) injection 5 mg, 5 mg, IntraMUSCular, Q6H PRN **AND** LORazepam (ATIVAN) injection 2 mg, 2 mg, IntraMUSCular, Q6H PRN **AND** diphenhydrAMINE (BENADRYL) injection 50 mg, 50 mg, IntraMUSCular, Q6H PRN  haloperidol (HALDOL) tablet 5 mg, 5 mg, Oral, Q6H PRN **AND** LORazepam (ATIVAN) tablet 2 mg, 2 mg, Oral, Q6H PRN  traZODone (DESYREL) tablet 50 mg, 50 mg, Oral, Nightly PRN    ASSESSMENT  Severe depressed bipolar I disorder without psychotic features ([facility] [address]         HANDOFF  Patient symptoms are: Minimal improvement  Medications as determined by attending physician  Encourage participation in groups and milieu. Probable discharge is to be determined by MD    Electronically signed by ADELINA Medina CNP on 8/3/2022 at 4:44 PM    **This report has been created using voice recognition software. It may contain minor errors which are inherent in voice recognition technology. **     I independently saw and evaluated the patient. I reviewed the nurse practitioners documentation above. Any additional comments or changes to the nurse practitioners documentation are stated below otherwise agree with assessment. Plan will be as follows:  Patient is extremely discharge focused. Difficult to engage in sustained meaningful conversation. She is pressured. Very labile. We will increase olanzapine  PLAN  Patient s symptoms   show no change  Increase olanzapine to 10 mg by mouth at bedtime  Attempt to develop insight  Psycho-education conducted. Supportive Therapy conducted.   Probable discharge is undetermined at this time  Follow-up daily while on inpatient unit

## 2022-08-03 NOTE — PLAN OF CARE
INTERVENTIONS:  1. Administer medication as ordered  2. Teach and rehearse alternative coping skills  3. Provide emotional support with 1:1 interaction with staff  Outcome: Progressing  Pt. Says her anxiety is controlled but remains. Accepting of support and reassurance offered. Problem: Pain  Goal: Verbalizes/displays adequate comfort level or baseline comfort level  Outcome: Progressing  Pt relates to pain in right shoulder. Nurse is aware.

## 2022-08-04 PROCEDURE — 6370000000 HC RX 637 (ALT 250 FOR IP)

## 2022-08-04 PROCEDURE — 6370000000 HC RX 637 (ALT 250 FOR IP): Performed by: INTERNAL MEDICINE

## 2022-08-04 PROCEDURE — 6370000000 HC RX 637 (ALT 250 FOR IP): Performed by: PSYCHIATRY & NEUROLOGY

## 2022-08-04 PROCEDURE — APPSS30 APP SPLIT SHARED TIME 16-30 MINUTES

## 2022-08-04 PROCEDURE — 1240000000 HC EMOTIONAL WELLNESS R&B

## 2022-08-04 PROCEDURE — 99232 SBSQ HOSP IP/OBS MODERATE 35: CPT | Performed by: PSYCHIATRY & NEUROLOGY

## 2022-08-04 RX ORDER — OLANZAPINE 10 MG/1
10 TABLET ORAL NIGHTLY
Qty: 30 TABLET | Refills: 0 | Status: SHIPPED | OUTPATIENT
Start: 2022-08-04

## 2022-08-04 RX ORDER — LIDOCAINE 4 G/G
1 PATCH TOPICAL DAILY
Qty: 30 PATCH | Refills: 0 | Status: SHIPPED | OUTPATIENT
Start: 2022-08-05

## 2022-08-04 RX ORDER — FLUOXETINE 10 MG/1
10 CAPSULE ORAL DAILY
Qty: 30 CAPSULE | Refills: 0 | Status: SHIPPED | OUTPATIENT
Start: 2022-08-05

## 2022-08-04 RX ORDER — TRAZODONE HYDROCHLORIDE 50 MG/1
50 TABLET ORAL NIGHTLY PRN
Qty: 30 TABLET | Refills: 0 | Status: SHIPPED | OUTPATIENT
Start: 2022-08-04

## 2022-08-04 RX ORDER — HYDROXYZINE 50 MG/1
50 TABLET, FILM COATED ORAL 3 TIMES DAILY PRN
Qty: 30 TABLET | Refills: 0 | Status: SHIPPED | OUTPATIENT
Start: 2022-08-04 | End: 2022-08-14

## 2022-08-04 RX ADMIN — IBUPROFEN 400 MG: 400 TABLET ORAL at 20:46

## 2022-08-04 RX ADMIN — TRAZODONE HYDROCHLORIDE 50 MG: 50 TABLET ORAL at 20:45

## 2022-08-04 RX ADMIN — FLUOXETINE 10 MG: 10 CAPSULE ORAL at 08:28

## 2022-08-04 RX ADMIN — ACETAMINOPHEN 650 MG: 325 TABLET, FILM COATED ORAL at 12:03

## 2022-08-04 RX ADMIN — OLANZAPINE 10 MG: 5 TABLET, FILM COATED ORAL at 20:45

## 2022-08-04 ASSESSMENT — PAIN SCALES - GENERAL
PAINLEVEL_OUTOF10: 4
PAINLEVEL_OUTOF10: 8

## 2022-08-04 ASSESSMENT — PAIN DESCRIPTION - LOCATION: LOCATION: BACK

## 2022-08-04 NOTE — PLAN OF CARE
Problem: Coping  Goal: Pt/Family able to verbalize concerns and demonstrate effective coping strategies  Description: INTERVENTIONS:  1. Assist patient/family to identify coping skills, available support systems and cultural and spiritual values  2. Provide emotional support, including active listening and acknowledgement of concerns of patient and caregivers  3. Reduce environmental stimuli, as able  4. Instruct patient/family in relaxation techniques, as appropriate  5. Assess for spiritual pain/suffering and initiate Spiritual Care, Psychosocial Clinical Specialist consults as needed  8/4/2022 1949 by Huong Aburto LPN  Outcome: Progressing   Patient is accepting of 1:1 talk time with writer. Patient currently denies any suicidal or homicidal thoughts, as well as any auditory or visual hallucinations. Patient is calm and maintains behavioral control on unit. Patient is medication compliant and verbalizes no concerns with her medications. Patient reports readiness for discharge. No other concerns verbalized. Safety checks maintained. Will continue to monitor and provide support and reassurance as needed. Problem: Depression/Self Harm  Goal: Effect of psychiatric condition will be minimized and patient will be protected from self harm  Description: INTERVENTIONS:  1. Assess impact of patient's symptoms on level of functioning, self care needs and offer support as indicated  2. Assess patient/family knowledge of depression, impact on illness and need for teaching  3. Provide emotional support, presence and reassurance  4. Assess for possible suicidal thoughts or ideation. If patient expresses suicidal thoughts or statements do not leave alone, initiate Suicide Precautions, move to a room close to the nursing station and obtain sitter  5.  Initiate consults as appropriate with Mental Health Professional, Spiritual Care, Psychosocial CNS, and consider a recommendation to the LIP for a Psychiatric Consultation  8/4/2022 1949 by Huong Aburto LPN  Outcome: Progressing   Patient remains free from self harm. Patient agrees to seek staff if thoughts arise. 15 minute checks maintained for patient safety. Will continue to monitor and provide support and reassurance as needed. Problem: Anxiety  Goal: Will report anxiety at manageable levels  Description: INTERVENTIONS:  1. Administer medication as ordered  2. Teach and rehearse alternative coping skills  3. Provide emotional support with 1:1 interaction with staff  8/4/2022 1949 by Huong Aburto LPN  Outcome: Progressing   Patient is accepting of 1:1 talk time with writer. Patient currently denies any suicidal or homicidal thoughts, as well as any auditory or visual hallucinations. Patient is calm and maintains behavioral control on unit. Patient is medication compliant and verbalizes no concerns with her medications. Patient reports readiness for discharge. No other concerns verbalized. Safety checks maintained. Will continue to monitor and provide support and reassurance as needed. Problem: Pain  Goal: Verbalizes/displays adequate comfort level or baseline comfort level  8/4/2022 1949 by Huong Aburto LPN  Outcome: Progressing   Patient currently reports no pain. Patient encouraged to seek staff if as needed pain medication needed. Patient verbalized understanding. Will continue to monitor and provide support and reassurance as needed.

## 2022-08-04 NOTE — PLAN OF CARE
Problem: Coping  Goal: Pt/Family able to verbalize concerns and demonstrate effective coping strategies  Description: INTERVENTIONS:  1. Assist patient/family to identify coping skills, available support systems and cultural and spiritual values  2. Provide emotional support, including active listening and acknowledgement of concerns of patient and caregivers  3. Reduce environmental stimuli, as able  4. Instruct patient/family in relaxation techniques, as appropriate  5. Assess for spiritual pain/suffering and initiate Spiritual Care, Psychosocial Clinical Specialist consults as needed  8/3/2022 2139 by Kiya Avery RN  Outcome: Not Progressing    Problem: Coping  Goal: Pt/Family able to verbalize concerns and demonstrate effective coping strategies  Description: INTERVENTIONS:  1. Assist patient/family to identify coping skills, available support systems and cultural and spiritual values  2. Provide emotional support, including active listening and acknowledgement of concerns of patient and caregivers  3. Reduce environmental stimuli, as able  4. Instruct patient/family in relaxation techniques, as appropriate  5. Assess for spiritual pain/suffering and initiate Spiritual Care, Psychosocial Clinical Specialist consults as needed  8/3/2022 2139 by Kiya Avery RN  Outcome: Not Progressing  8/3/2022 1401 by Maureen Raymundo  Outcome: Progressing     Problem: Anxiety  Goal: Will report anxiety at manageable levels  Description: INTERVENTIONS:  1. Administer medication as ordered  2. Teach and rehearse alternative coping skills  3. Provide emotional support with 1:1 interaction with staff  8/3/2022 2139 by Kiya Avery RN  Outcome: Not Progressing    Patient is complaining of anxiety at this time. Stating that they feel restless and are having trouble sleeping and calming down in order to rest this evening. Medication was given as prescribed for increased anxiety see MAR.     Pt remains free from self harm this shift. Pt denies wanting to cause harm to self or others at this time. Pt encouraged to seek nursing staff at anytime if she felt at danger to themselves or others. Pt states understanding. Safety checks maintained [id number]    Patient out for short periods irritable on approach. Medication compliant.   Focused on discharge

## 2022-08-04 NOTE — PLAN OF CARE
[facility]  Day 3 Interdisciplinary Treatment Plan NOTE    Review Date & Time: 8/4/22 1300    Admission Type:   Admission Type: Voluntary    Reason for admission:  Reason for Admission: Suicidal ideation. Patient reports increased depression, hopelessness, and helpelessness related to being off of her medications. Patient had no plan but intent.   Estimated Length of Stay:  5-7 days  Estimated Discharge Date Update: to be determined by physician    PATIENT STRENGTHS:  Patient Strengths:   Patient Strengths and Limitations:Limitations: Difficulty problem solving/relies on others to help solve problems, Inappropriate/potentially harmful leisure interests, Tendency to isolate self, General negative or hopeless attitude about future/recovery  Addictive Behavior:Addictive Behavior  In the Past 3 Months, Have You Felt or Has Someone Told You That You Have a Problem With  : None  Medical Problems:  Past Medical History:   Diagnosis Date    ADHD     Allergy     dilaudid    Aspiration pneumonia ([address] 12/27/2021    Bipolar 1 disorder ([address]     Bipolar affective disorder, depressed, severe ([address] 12/30/2021    Bipolar affective disorder, depressed, severe ([address] 12/30/2021    Bronchitis, chronic ([address]     Cocaine abuse with intoxication with complication ([address] 6/6/5045    Depression with suicidal ideation 12/29/2021    Intermittent asthma without complication 6/5/0352    Osteoarthritis     Osteoarthritis     Rh negative state in antepartum period 5/8/2012    Schizophrenia ([address]     Seizures ([address] 7/5/2016    Spinal stenosis        Risk:  Fall Risk   Jg Scale Jg Scale Score: 22  BVC    Change in scores:  No Changes to plan of Care:  No    Status EXAM:   Mental Status and Behavioral Exam  Normal: No  Level of Assistance: Independent/Self  Facial Expression: Sad  Affect: Appropriate, Normal  Level of Consciousness: Alert  Frequency of Checks: 4 times per hour, close  Mood:Normal: No  Mood: Depressed, Anxious  Motor Activity:Normal: Yes  Motor Activity: Increased  Eye Contact: Good  Observed Behavior: Friendly, Guarded  Sexual Misconduct History: Current - no  Preception: [place] to person, [place] to time, [place] to place, [place] to situation  Attention:Normal: Yes  Attention: Distractible  Thought Processes: Blocking, Circumstantial  Thought Content:Normal: Yes  Thought Content: Preoccupations  Depression Symptoms: Isolative  Anxiety Symptoms: Generalized  Darcy Symptoms: No problems reported or observed. Hallucinations: None  Delusions: No  Memory:Normal: No  Memory: Confabulation, Poor recent  Insight and Judgment: No  Insight and Judgment: Poor judgment, Poor insight    Daily Assessment Last Entry:   Daily Sleep (WDL): Within Defined Limits            Daily Nutrition (WDL): Within Defined Limits  Level of Assistance: Independent/Self    Patient Monitoring:  Frequency of Checks: 4 times per hour, close    Psychiatric Symptoms:   Depression Symptoms  Depression Symptoms: Isolative  Anxiety Symptoms  Anxiety Symptoms: Generalized  Darcy Symptoms  Darcy Symptoms: No problems reported or observed. Suicide Risk CSSR-S:  1) Within the past month, have you wished you were dead or wished you could go to sleep and not wake up? : Yes  2) Have you actually had any thoughts of killing yourself? : Yes  3) Have you been thinking about how you might kill yourself? : No  5) Have you started to work out or worked out the details of how to kill yourself?  Do you intend to carry out this plan? : No  6) Have you ever done anything, started to do anything, or prepared to do anything to end your life?: No  Change in Result: No Change in Plan of care: No      EDUCATION:   Learner Progress Toward Treatment Goals: Reviewed results and recommendations of this team, Reviewed group plan and strategies, Reviewed signs, symptoms and risk of self harm and violent behavior, Reviewed goals and plan of care    Method: small group, individual verbal education    Outcome: Pt refused to participate    PATIENT GOALS:  Short term: Refused to participate  Long term: Refused to participate    PLAN/TREATMENT RECOMMENDATIONS UPDATE:  continue with group therapies, increased socialization, continue planning for after discharge goals, continue with medication compliance    SHORT-TERM GOALS UPDATE:   Time frame for Short-Term Goals:  5-7 days    LONG-TERM GOALS UPDATE:   Time frame for Long-Term Goals:  6 months    Members Present in Team Meeting:   See signature sheet     Brie Noble RN

## 2022-08-04 NOTE — PROGRESS NOTES
CLINICAL PHARMACY NOTE: MEDS TO BEDS    Total # of Prescriptions Filled: 4   The following medications were delivered to the patient:  Fluoxetine HCL 10mg  Hydroxyzine HCL 50mg  Trazodone HCL 50mg  Olanzapine 10mg    Additional Documentation:  Delivered medications to nurses station

## 2022-08-04 NOTE — GROUP NOTE
Group Therapy Note    Date: 8/4/2022    Group Start Time: 1100  Group End Time: 4809  Group Topic: Recreational    [facility] LORENZO Goodman    Recreation Group Note        Date: August 4, 2022 Start Time: 11am  End Time: 11:45am      Number of Participants in Group & Unit Census:  3    Topic: Open Leisure     Goal of Group:Patient will identify different forms of healthy recreation and leisure. Comments:     Patient did not participate in Recreation group, despite staff encouragement and explanation of benefits. Patient remain seclusive to self. Q15 minute safety checks maintained for patient safety and will continue to encourage patient to attend unit programming.           Signature:  Chelly Martin, [address]

## 2022-08-04 NOTE — GROUP NOTE
Group Therapy Note    Date: 8/4/2022    Group Start Time: 0900  Group End Time: 5388  Group Topic: Group Documentation    Fabiola Fofana Meeting Group Note        Date: 8/4/2022  Start Time: 9am  End Time: 0930      Number of Participants in Group & Unit Census:  5/17    Topic: Goal setting group/support group    Goal of Group: Set a short term goal for the day      Comments:     Patient did not participate in [employer] group, despite staff encouragement and explanation of benefits. Patient remain seclusive to self. Q15 minute safety checks maintained for patient safety and will continue to encourage patient to attend unit programming.

## 2022-08-04 NOTE — GROUP NOTE
Group Therapy Note    Date: 8/4/2022    Group Start Time: 9850  Group End Time: 9849  Group Topic: Psychoeducation    LORENZO Weeks    Psych-Ed/Relapse Prevention Group Note        Date: August 4, 2022 Start Time: 1:45pm End Time: 2:15      Number of Participants in Group & Unit Census:  4      Topic: Chat pack     Goal of Group:Patient will improve interpersonal communication and interactions. Comments:     Patient did not participate in Psych-Ed/Relapse Prevention group, despite staff encouragement and explanation of benefits. Patient remain seclusive to self. Q15 minute safety checks maintained for patient safety and will continue to encourage patient to attend unit programming.         Signature:  Veronique Rosario, [address]

## 2022-08-04 NOTE — PROGRESS NOTES
Daily Progress Note  8/4/2022    Patient Name: Cherie Kehr    CHIEF COMPLAINT:  Depression with suicidal ideation          SUBJECTIVE:      Patient is seen today for a follow up assessment. Patient is compliant with scheduled medications. She has not required emergency medications in the past 24 hours. This writer attempted to see Haven Liz in her room today however she is sleeping. She does wake up and respond to verbal stimuli however is somewhat evasive. She reports improvement in both depression and anxiety. She states that she slept poorly last night and continues to feel tired throughout the day today. She denies any issues with her appetite. Haven Liz reports improvement in suicidal ideation. She denies homicidal ideation. She denies auditory and visual hallucinations. She denies paranoia. She is discharge focused today and due to this appears to be superficial and minimizing in response to assessment questions. She is denying any side effects to medications at this time. Haven Liz remains isolative to her room and does not attend any groups on the unit. Appetite:  [x] Normal/Adequate/Unchanged  [] Increased  [] Decreased      Sleep:       [] Normal/Adequate/Unchanged  [] Fair  [x] Poor      Group Attendance on Unit:   [] Yes  [] Selectively    [x] No    Medication Side Effects:  Patient denies any medication side effects at the time of assessment. Mental Status Exam  Level of consciousness: Alert and awake. Appearance: Appropriate attire for setting, resting in bed, with fair  grooming and hygiene. Behavior/Motor: Evasive, withdrawn  Attitude toward examiner: Semi-cooperative, attentive, fair eye contact. Speech: Normal rate, normal volume, normal tone. Mood:  Patient reports \"I didn't sleep well\". Affect: Anxious  Thought processes: Linear and coherent. Thought content: Denies homicidal ideation.   Suicidal Ideation: Reports improvement in suicidal ideations  Delusions: No evidence of delusions. Denies paranoia. Perceptual Disturbance: Patient does not appear to be responding to internal stimuli. Denies auditory hallucinations. Denies visual hallucinations. Cognition: Oriented to self, location, time, and situation. Memory: Intact. Insight & Judgement: Poor. Data   height is 5' 6\" (1.676 m) and weight is 145 lb (65.8 kg). Her temporal temperature is 98.4 °F (36.9 °C). Her blood pressure is 93/62 and her pulse is 65. Her respiration is 14 and oxygen saturation is 99%. Labs:   Admission on 08/01/2022   Component Date Value Ref Range Status    HCG(Urine) Pregnancy Test 08/01/2022 NEGATIVE  NEGATIVE Final    Comment: Specimens with hCG levels near the threshold of the test (25 mIU/mL) may give a negative or   indeterminate result. In such cases, another test should be performed with a new specimen   in 48-72 hours. If early pregnancy is suspected clinically in this setting, correlation   with quantitative serum b-hCG level is suggested.       WBC 08/01/2022 10.8  3.5 - 11.0 k/uL Final    RBC 08/01/2022 4.54  4.0 - 5.2 m/uL Final    Hemoglobin 08/01/2022 14.2  12.0 - 16.0 g/dL Final    Hematocrit 08/01/2022 41.5  36 - 46 % Final    MCV 08/01/2022 91.4  80 - 100 fL Final    MCH 08/01/2022 31.3  26 - 34 pg Final    MCHC 08/01/2022 34.2  31 - 37 g/dL Final    RDW 08/01/2022 13.6  11.5 - 14.9 % Final    Platelets 50/08/7147 416  150 - 450 k/uL Final    MPV 08/01/2022 7.2  6.0 - 12.0 fL Final    Seg Neutrophils 08/01/2022 67 (A) 36 - 66 % Final    Lymphocytes 08/01/2022 23 (A) 24 - 44 % Final    Monocytes 08/01/2022 7  1 - 7 % Final    Eosinophils % 08/01/2022 2  0 - 4 % Final    Basophils 08/01/2022 1  0 - 2 % Final    Segs Absolute 08/01/2022 7.30  1.3 - 9.1 k/uL Final    Absolute Lymph # 08/01/2022 2.50  1.0 - 4.8 k/uL Final    Absolute Mono # 08/01/2022 0.70  0.1 - 1.3 k/uL Final    Absolute Eos # 08/01/2022 0.20  0.0 - 0.4 k/uL Final    Basophils Absolute 08/01/2022 0.10  0.0 - 0.2 k/uL Final    Glucose 08/01/2022 108 (A) 70 - 99 mg/dL Final    BUN 08/01/2022 13  6 - 20 mg/dL Final    Creatinine 08/01/2022 0.79  0.50 - 0.90 mg/dL Final    Calcium 08/01/2022 9.4  8.6 - 10.4 mg/dL Final    Sodium 08/01/2022 137  135 - 144 mmol/L Final    Potassium 08/01/2022 3.5 (A) 3.7 - 5.3 mmol/L Final    Chloride 08/01/2022 101  98 - 107 mmol/L Final    CO2 08/01/2022 24  20 - 31 mmol/L Final    Anion Gap 08/01/2022 12  9 - 17 mmol/L Final    Alkaline Phosphatase 08/01/2022 65  35 - 104 U/L Final    ALT 08/01/2022 16  5 - 33 U/L Final    AST 08/01/2022 19  <32 U/L Final    Total Bilirubin 08/01/2022 0.45  0.3 - 1.2 mg/dL Final    Total Protein 08/01/2022 7.0  6.4 - 8.3 g/dL Final    Albumin 08/01/2022 4.3  3.5 - 5.2 g/dL Final    GFR Non- 08/01/2022 >60  >60 mL/min Final    GFR  08/01/2022 >60  >60 mL/min Final    GFR Comment 08/01/2022        Final    Comment: Average GFR for 30-36 years old:   107 mL/min/1.73sq m  Chronic Kidney Disease:   <60 mL/min/1.73sq m  Kidney failure:   <15 mL/min/1.73sq m              eGFR calculated using average adult body mass.  Additional eGFR calculator available at:        Verifico.br            Salicylate Lvl 45/95/6613 <1 (A) 3 - 10 mg/dL Final    Acetaminophen Level 08/01/2022 <5 (A) 10 - 30 ug/mL Final    Ethanol 08/01/2022 <10  <10 mg/dL Final    Ethanol percent 08/01/2022 <0.010  % Final    Magnesium 08/01/2022 2.0  1.6 - 2.6 mg/dL Final    Amphetamine Screen, Ur 08/01/2022 NEGATIVE  NEGATIVE Final    Comment:       (Positive cutoff 1000 ng/mL)                  Barbiturate Screen, Ur 08/01/2022 NEGATIVE  NEGATIVE Final    Comment:       (Positive cutoff 200 ng/mL)                  Benzodiazepine Screen, Urine 08/01/2022 NEGATIVE  NEGATIVE Final    Comment:       (Positive cutoff 200 ng/mL)                  Cocaine Metabolite, Urine 08/01/2022 POSITIVE (A) NEGATIVE Final    Comment:       (Positive cutoff 300 PRN  haloperidol lactate (HALDOL) injection 5 mg, 5 mg, IntraMUSCular, Q6H PRN **AND** LORazepam (ATIVAN) injection 2 mg, 2 mg, IntraMUSCular, Q6H PRN **AND** diphenhydrAMINE (BENADRYL) injection 50 mg, 50 mg, IntraMUSCular, Q6H PRN  haloperidol (HALDOL) tablet 5 mg, 5 mg, Oral, Q6H PRN **AND** LORazepam (ATIVAN) tablet 2 mg, 2 mg, Oral, Q6H PRN  traZODone (DESYREL) tablet 50 mg, 50 mg, Oral, Nightly PRN    ASSESSMENT  Severe depressed bipolar I disorder without psychotic features ([facility] [place] [address]         HANDOFF  Patient symptoms are: Modestly Improving. Medications as determined by attending physician  Monitor need and frequency of PRN medications. Encourage participation in groups and milieu. Probable discharge is to be determined by MD.     Electronically signed by ADELINA Abel CNP on 8/4/2022 at 2:23 PM    **This report has been created using voice recognition software. It may contain minor errors which are inherent in voice recognition technology. **    I independently saw and evaluated the patient. I reviewed the nurse practitioners documentation above. Any additional comments or changes to the nurse practitioners documentation are stated below otherwise agree with assessment. Plan will be as follows:  Patient denying side effects to medication. She is more calm and cooperative today. She is forward-looking and constructive. Denying suicidal or homicidal ideation intent or plan. Discussed possible discharge tomorrow with continued stability and patient is in agreement  PLAN  Patient s symptoms   are improving  Continue with current medication for now  Attempt to develop insight  Psycho-education conducted. Supportive Therapy conducted.   Probable discharge is tomorrow  Follow-up daily while on inpatient unit

## 2022-08-04 NOTE — GROUP NOTE
Group Therapy Note    Date: 8/4/2022    Group Start Time: 1000  Group End Time: 9681  Group Topic: Group Therapy    [facility]    TERESA Gomez        Group Therapy Note  Pt declined to attend psychotherapy at 1000 am despite encouragement. Pt offered 1:1 and refused.       Attendees: 5/17           Signature:  TERESA Gomez

## 2022-08-04 NOTE — PLAN OF CARE
Problem: Coping  Goal: Pt/Family able to verbalize concerns and demonstrate effective coping strategies  Description: INTERVENTIONS:  1. Assist patient/family to identify coping skills, available support systems and cultural and spiritual values  2. Provide emotional support, including active listening and acknowledgement of concerns of patient and caregivers  3. Reduce environmental stimuli, as able  4. Instruct patient/family in relaxation techniques, as appropriate  5. Assess for spiritual pain/suffering and initiate Spiritual Care, Psychosocial Clinical Specialist consults as needed  Outcome: Progressing  Flowsheets (Taken 8/4/2022 1242)  Patient/family able to verbalize anxieties, fears, and concerns, and demonstrate effective coping:   Assist patient/family to identify coping skills, available support systems and cultural and spiritual values   Provide emotional support, including active listening and acknowledgement of concerns of patient and caregivers     Problem: Depression/Self Harm  Goal: Effect of psychiatric condition will be minimized and patient will be protected from self harm  Description: INTERVENTIONS:  1. Assess impact of patient's symptoms on level of functioning, self care needs and offer support as indicated  2. Assess patient/family knowledge of depression, impact on illness and need for teaching  3. Provide emotional support, presence and reassurance  4. Assess for possible suicidal thoughts or ideation. If patient expresses suicidal thoughts or statements do not leave alone, initiate Suicide Precautions, move to a room close to the nursing station and obtain sitter  5.  Initiate consults as appropriate with Mental Health Professional, Spiritual Care, Psychosocial CNS, and consider a recommendation to the LIP for a Psychiatric Consultation  Outcome: Progressing  Flowsheets  Taken 8/4/2022 1242  Effect of psychiatric condition will be minimized and patient will be protected from self harm:   Assess impact of patients symptoms on level of functioning, self care needs and offer support as indicated   Assess patient/family knowledge of depression, impact on illness and need for teaching  Taken 8/4/2022 1241  Effect of psychiatric condition will be minimized and patient will be protected from self harm: Assess impact of patients symptoms on level of functioning, self care needs and offer support as indicated     Problem: Anxiety  Goal: Will report anxiety at manageable levels  Description: INTERVENTIONS:  1. Administer medication as ordered  2. Teach and rehearse alternative coping skills  3.  Provide emotional support with 1:1 interaction with staff  Outcome: Progressing  Flowsheets (Taken 8/4/2022 1242)  Will report anxiety at manageable levels:   Administer medication as ordered   Provide emotional support with 1:1 interaction with staff     Problem: Pain  Goal: Verbalizes/displays adequate comfort level or baseline comfort level  Outcome: Progressing  Flowsheets (Taken 8/4/2022 1242)  Verbalizes/displays adequate comfort level or baseline comfort level:   Encourage patient to monitor pain and request assistance   Assess pain using appropriate pain scale

## 2022-08-04 NOTE — DISCHARGE INSTRUCTIONS
[phone]     Suicide Hotline (Rescue Crisis) (850) [phone]     Recovery Help line- 543.997.4578      To obtain results of pending studies call Medical Records at: [phone]     For emergencies and 24 hour/7 days a week contact information:  Jo Kristijevon los síntomas de COVID-19 y la gripe  Learning About COVID-19 and Flu Symptoms  ¿Cómo se puede diferenciar COVID-19 de la gripe? COVID-19 y la gripe tienen síntomas similares. Pueden ser difíciles de diferenciar. La única manera de saber con certeza qué enfermedad tiene es hacerse reyes prueba. Si tiene [employer] de detección de COVID-19, pregúntele al médico o visite cdc.gov para usar la herramienta dedetección viral de COVID-19. Dado que los síntomas son tan parecidos, tiene sentido actuar anila si tuviera COVID-19 hasta que [employer] de la prueba. [place] significa quedarse en casa y limitar el contacto con otras personas en muñoz hogar. Deberá lavarse las clifford con frecuencia y desinfectar las superficies que toque. Y asegúrese de usar reyes mascarilla cuando esté cerca de otras personas. Estos también sonbuenos consejos si roxy que tiene gripe. COVID-19 y la gripe tienen estos síntomas en común: Gabino Radha  Tos  Falta de aire  Fatiga (cansancio)  Dolor de garganta  Congestión o goteo nasal  Janis musculares y corporales  Dolor de lola  Vómito y diarrea (más común en niños que en adultos)  COVID-19 tiene otro síntoma que también puede presentarse:  Nueva pérdida del gusto o el olfato  Los síntomas de COVID-19 pueden manifestarse entre 2 y 15 miguel después de [facility]. Los síntomas de la gripe suelen presentarse entre 1 y 3 días después de lainfección. ¿Por qué debería vacunarse contra la gripe ange la pandemia de COVID-19? Es importante ponerse la vacuna anual contra la gripe. Tanto la gripe anila COVID-19 pueden estar activos al [employer].  Ambas infecciones pueden enfermarlo al mismo tiempo. Y tener ambas infecciones puede enfermarlo más quetener solo Brooks. La vacuna contra la gripe no lo protegerá contra COVID-19. Yusuf puede ayudar a prevenir la gripe o a reducir von síntomas. Si menos personas se enferman gravemente de gripe, esto ayudará a liberar los recursos médicos necesarios para las personas que necesitan atención urgente, anila las que sufren deCOVID-19, ataques cardíacos y lesiones por accidentes. ¿Dónde puede encontrar más información en inglés? Von Morales a https://chpepiceweb.healthJustOne Database Inc.. org e ingrese a muñoz cuenta de MyChart. Tommy Gipson C123 en el cuadro \"Search Health Information\" para más información (en inglés) sobre \"Aprenda sobre los síntomas de COVID-19 y la gripe. \"     Si no tiene reyes cuenta, kristin yessenia en el enlace \"Sign Up Now\". Revisado: 26 [place], 18               Versión del contenido: 13.3  © 6324-5701 Healthwise, Incorporated. Las instrucciones de cuidado fueron adaptadas bajo licencia por [facility] ([facility]). Si usted tiene [facility] afección médica o sobre estas instrucciones, siempre pregunte a muñoz profesional de roberto. Healthwise, Incorporated niega toda garantía o responsabilidad por muñoz uso de esta información.

## 2022-08-05 VITALS
TEMPERATURE: 97.7 F | HEART RATE: 69 BPM | BODY MASS INDEX: 23.3 KG/M2 | DIASTOLIC BLOOD PRESSURE: 75 MMHG | WEIGHT: 145 LBS | RESPIRATION RATE: 14 BRPM | SYSTOLIC BLOOD PRESSURE: 112 MMHG | HEIGHT: 66 IN | OXYGEN SATURATION: 99 %

## 2022-08-05 PROCEDURE — 99239 HOSP IP/OBS DSCHRG MGMT >30: CPT | Performed by: PSYCHIATRY & NEUROLOGY

## 2022-08-05 PROCEDURE — 6370000000 HC RX 637 (ALT 250 FOR IP)

## 2022-08-05 PROCEDURE — 6370000000 HC RX 637 (ALT 250 FOR IP): Performed by: INTERNAL MEDICINE

## 2022-08-05 RX ADMIN — FLUOXETINE 10 MG: 10 CAPSULE ORAL at 08:49

## 2022-08-05 ASSESSMENT — PAIN SCALES - GENERAL: PAINLEVEL_OUTOF10: 8

## 2022-08-05 NOTE — BH NOTE
Patient given tobacco quitline number [phone] at this time, refusing to call at this time, states \" I just dont want to quit now\"- patient given information as to the dangers of long term tobacco use. Continue to reinforce the importance of tobacco cessation.

## 2022-08-05 NOTE — CARE COORDINATION
Name: Felicia Powell    : 1985    Discharge Date: 22    Primary Auth/Cert #: [id number]    Destination: home     Discharge Medications:      Medication List        START taking these medications      FLUoxetine 10 MG capsule  Commonly known as: PROZAC  Take 1 capsule by mouth in the morning. Notes to patient: Clear thoughts     hydrOXYzine HCl 50 MG tablet  Commonly known as: ATARAX  Take 1 tablet by mouth 3 times daily as needed for Anxiety  Notes to patient: Anxiety      lidocaine 4 % external patch  Place 1 patch onto the skin in the morning.   Notes to patient: Pain     OLANZapine 10 MG tablet  Commonly known as: ZYPREXA  Take 1 tablet by mouth nightly  Notes to patient: Clear thoughts     traZODone 50 MG tablet  Commonly known as: DESYREL  Take 1 tablet by mouth nightly as needed for Sleep  Notes to patient: Insomnia            CONTINUE taking these medications      polyethylene glycol 17 GM/SCOOP powder  Commonly known as: MiraLax  Take 17 g by mouth daily  Notes to patient: Constipation            STOP taking these medications      acetaminophen 325 MG tablet  Commonly known as: Tylenol     albuterol sulfate  (90 Base) MCG/ACT inhaler  Commonly known as: Proventil HFA     ARIPiprazole 5 MG tablet  Commonly known as: ABILIFY     doxepin 25 MG capsule  Commonly known as: SINEQUAN     etodolac 500 MG tablet  Commonly known as: LODINE     hydrocortisone-aloe 1 % Crea cream     ibuprofen 200 MG tablet  Commonly known as: ADVIL;MOTRIN     ibuprofen 800 MG tablet  Commonly known as: ADVIL;MOTRIN     methocarbamol 750 MG tablet  Commonly known as: Robaxin-750     OXcarbazepine 150 MG tablet  Commonly known as: TRILEPTAL     predniSONE 10 MG tablet  Commonly known as: Kareem Cord 1.5 MG capsule  Generic drug: cariprazine hcl               Where to Get Your Medications        These medications were sent to [facility] [address] [facility] [address] [address] [facility] [phone]  [address] [facility] [address]      Phone: [phone]   FLUoxetine 10 MG capsule  hydrOXYzine HCl 50 MG tablet  lidocaine 4 % external patch  OLANZapine 10 MG tablet  traZODone 50 MG tablet         Follow Up Appointment: [place] [address] [facility]  [phone]    Follow up on 8/11/2022  @1245pm with Vahe Conde RN

## 2022-08-05 NOTE — BH NOTE
[facility]  Discharge Note    Pt discharged with followings belongings:   Dental Appliances: None  Vision - Corrective Lenses: None  Hearing Aid: None  Jewelry: Body Piercing  Body Piercings Removed: No  Clothing: Footwear, Undergarments, Pants, Shirt  Other Valuables: Lighter/Matches, Purse, Wallet, Personal Toiletries, Other (Comment) (bag with makeup, brown bag with makeup)   Valuables sent home with patient. Patient education on aftercare instructions: yES  Information faxed to [facility] by staff  at 9:51 AM .Patient verbalize understanding of AVS:  Yes. Status EXAM upon discharge:  Mental Status and Behavioral Exam  Normal: No  Level of Assistance: Independent/Self  Facial Expression: Worried  Affect: Appropriate  Level of Consciousness: Alert  Frequency of Checks: 4 times per hour, close  Mood:Normal: No  Mood: Anxious  Motor Activity:Normal: Yes  Motor Activity: Increased  Eye Contact: Good  Observed Behavior: Cooperative  Sexual Misconduct History: Current - no  Preception: [place] to person, [place] to time, [place] to place, [place] to situation  Attention:Normal: Yes  Attention: Distractible  Thought Processes: Circumstantial  Thought Content:Normal: Yes  Thought Content: Preoccupations  Depression Symptoms: Isolative  Anxiety Symptoms: Generalized  Darcy Symptoms: No problems reported or observed. Hallucinations: None  Delusions: No  Memory:Normal: No  Memory: Poor recent, Confabulation  Insight and Judgment: No  Insight and Judgment: Poor judgment, Poor insight        Metabolic Screening:    No results found for: LABA1C    No results found for: CHOL  No results found for: TRIG  No results found for: HDL  No components found for: LDLCAL  No results found for: LABVLDL    Patient denies SI/HI. Discharged to home, boyfriend 1 picked up.  Patient stable upon Lindy Vera LPN
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
## 2022-12-02 ENCOUNTER — HOSPITAL ENCOUNTER (EMERGENCY)
Age: 37
Discharge: LEFT BEFORE DISCHARGE | End: 2022-12-02

## 2022-12-02 VITALS
SYSTOLIC BLOOD PRESSURE: 141 MMHG | TEMPERATURE: 98.7 F | HEART RATE: 97 BPM | OXYGEN SATURATION: 98 % | DIASTOLIC BLOOD PRESSURE: 89 MMHG | RESPIRATION RATE: 16 BRPM | HEIGHT: 66 IN | WEIGHT: 147 LBS | BODY MASS INDEX: 23.63 KG/M2

## 2022-12-02 DIAGNOSIS — Z53.21 ELOPED FROM EMERGENCY DEPARTMENT: Primary | ICD-10-CM

## 2022-12-02 ASSESSMENT — PAIN - FUNCTIONAL ASSESSMENT: PAIN_FUNCTIONAL_ASSESSMENT: 0-10

## 2022-12-02 ASSESSMENT — PAIN SCALES - GENERAL: PAINLEVEL_OUTOF10: 0

## 2022-12-02 NOTE — ED TRIAGE NOTES
Mode of arrival (squad #, walk in, police, etc) : walk in        Chief complaint(s): cough        Arrival Note (brief scenario, treatment PTA, etc). : x 1 with \"blood\" in it        C= \"Have you ever felt that you should Cut down on your drinking? \"  No  A= \"Have people Annoyed you by criticizing your drinking? \"  No  G= \"Have you ever felt bad or Guilty about your drinking? \"  No  E= \"Have you ever had a drink as an Eye-opener first thing in the morning to steady your nerves or to help a hangover? \"  No      Deferred []      Reason for deferring: N/A    *If yes to two or more: probable alcohol abuse. *

## 2022-12-03 NOTE — ED PROVIDER NOTES
Patient was never seen or evaluated by this provider. She left the ED without being seen after triage.      Kike [place], [place]  12/03/22 0774

## 2023-06-28 ENCOUNTER — HOSPITAL ENCOUNTER (OUTPATIENT)
Dept: PHYSICAL THERAPY | Facility: CLINIC | Age: 38
Setting detail: THERAPIES SERIES
Discharge: HOME | End: 2023-06-28
Payer: MEDICARE

## 2023-06-28 PROCEDURE — 97161 PT EVAL LOW COMPLEX 20 MIN: CPT

## 2023-06-28 PROCEDURE — 97110 THERAPEUTIC EXERCISES: CPT

## 2023-07-03 ENCOUNTER — HOSPITAL ENCOUNTER (OUTPATIENT)
Dept: PHYSICAL THERAPY | Facility: CLINIC | Age: 38
Setting detail: THERAPIES SERIES
Discharge: HOME | End: 2023-07-03

## 2023-07-03 NOTE — FLOWSHEET NOTE
[] [facility] ([facility]) - [facility] &  Therapy  [address] [place] [address].    [phone]  F: [phone]   [x] [address]  [address]   [address]  P: [phone]  F: [phone]  [] [address]  [address]  P: [phone]  F: [phone] [] Kelton Monalisa  P: [phone]  F: [phone]  [] [address] [facility]  [address]   [place]: [phone]  F: [phone]   [] [address]  [address]  [place]: [phone]   F: [phone]     Physical Therapy Cancel/No Show note    Date: 7/3/2023  Patient: Reinaldo Galloway  : 1985  MRN: [id number]    Cancels/No Shows to date:     For today's appointment patient:    [x]  Cancelled    [] Rescheduled appointment    [] No-show     Reason given by patient:    []  Patient ill    [x]  Conflicting appointment    [] No transportation      [] Conflict with work    [] No reason given    [] Weather related    [] COVID-19    [] Other:      Comments:        [x] Next appointment was confirmed    Electronically signed by: Elvis Estevez PT

## 2023-07-13 ENCOUNTER — HOSPITAL ENCOUNTER (OUTPATIENT)
Dept: PHYSICAL THERAPY | Facility: CLINIC | Age: 38
Setting detail: THERAPIES SERIES
Discharge: HOME | End: 2023-07-13
Payer: MEDICARE

## 2023-07-13 PROCEDURE — 97110 THERAPEUTIC EXERCISES: CPT

## 2023-07-13 NOTE — FLOWSHEET NOTE
current frequency toward long and short term goals.     [x] Specific Instructions for subsequent treatments: trunk ROM and strengthening;  get pt to slow downand focus on proper technique      Time In:4:05 pm            Time Out: 4:40 pm    Electronically signed by:  Melissa Morales PT

## 2023-07-24 ENCOUNTER — HOSPITAL ENCOUNTER (OUTPATIENT)
Dept: PHYSICAL THERAPY | Facility: CLINIC | Age: 38
Setting detail: THERAPIES SERIES
Discharge: HOME | End: 2023-07-24
Payer: MEDICARE

## 2023-07-27 ENCOUNTER — APPOINTMENT (OUTPATIENT)
Dept: PHYSICAL THERAPY | Facility: CLINIC | Age: 38
End: 2023-07-27
Payer: MEDICARE

## 2023-08-17 NOTE — FLOWSHEET NOTE
Airway  Urgency: elective    Start Time: 8/17/2023 7:39 AM  Stop Time: 8/17/2023 7:41 AM    Airway not difficult    General Information and Staff    Patient location during procedure: OR  Anesthesiologist: Sergio Borges MD  Resident/CRNA: Lanie Nevarez CRNA  Performed: resident/CRNA   Performed by: Lanie Nevarez CRNA  Authorized by: Sergio Borges MD      Indications and Patient Condition  Indications for airway management: anesthesia  Sedation level: general  Preoxygenated: yes  MILS maintained throughout  Mask difficulty assessment: 1 - vent by mask    Final Airway Details  Final airway type: endotracheal airway      Successful airway: ETT  Cuffed: yes   Successful intubation technique: direct laryngoscopy  Endotracheal tube insertion site: oral  Blade: Carlos  Blade size: #4  ETT size (mm): 7.5  Cormack-Lehane Classification: grade IIa - partial view of glottis  Placement verified by: chest auscultation and capnometry   Measured from: lips  ETT to lips (cm): 22  Number of attempts at approach: 1  Number of other approaches attempted: 0  Atraumatic airway insertion             [] [facility]) - [facility] &  Therapy  [address].    [phone]  F: [phone]   [] [address]  [address]   [address]  P: [phone]  F: [phone]  [] [address]  [address]  P: [phone]  F: [phone] [] [facility]  P: [phone]  F: [phone]  [] [address]  [address]   [address]   [place]: [phone]  F: [phone]   [] [address] [facility]  [address]  [place]: [phone]   F: [phone]     Physical Therapy Cancel/No Show note    Date: 2023  Patient: Jenae Peck  : 1985  MRN: [id number]    Cancels/No Shows to date: 23    For today's appointment patient:    []  Cancelled    [] Rescheduled appointment    [x] No-show     Reason given by patient:    []  Patient ill    []  Conflicting appointment    [] No transportation      [] Conflict with work    [] No reason given    [] Weather related    [] [id number]    [x] Other:   NO SHOW    Comments:        [] Next appointment was confirmed    Electronically signed by: Ban Landis PT

## 2023-09-02 ENCOUNTER — HOSPITAL ENCOUNTER (EMERGENCY)
Age: 38
Discharge: HOME | End: 2023-09-03
Attending: EMERGENCY MEDICINE
Payer: MEDICARE

## 2023-09-02 VITALS
RESPIRATION RATE: 15 BRPM | BODY MASS INDEX: 20.69 KG/M2 | OXYGEN SATURATION: 98 % | HEIGHT: 66 IN | DIASTOLIC BLOOD PRESSURE: 77 MMHG | SYSTOLIC BLOOD PRESSURE: 111 MMHG | WEIGHT: 128.75 LBS | HEART RATE: 99 BPM | TEMPERATURE: 98.2 F
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
## 2023-09-02 DIAGNOSIS — T74.21XA RAPE, INITIAL ENCOUNTER: Primary | ICD-10-CM

## 2023-09-02 PROCEDURE — 96372 THER/PROPH/DIAG INJ SC/IM: CPT

## 2023-09-02 PROCEDURE — 99284 EMERGENCY DEPT VISIT MOD MDM: CPT

## 2023-09-02 ASSESSMENT — PAIN - FUNCTIONAL ASSESSMENT: PAIN_FUNCTIONAL_ASSESSMENT: 0-10

## 2023-09-02 ASSESSMENT — PAIN SCALES - GENERAL: PAINLEVEL_OUTOF10: 10

## 2023-09-03 LAB
ALBUMIN SERPL-MCNC: 4.1 G/DL (ref 3.5–5.2)
ALBUMIN/GLOB SERPL: 1.7 {RATIO} (ref 1–2.5)
ALP SERPL-CCNC: 61 U/L (ref 35–104)
ALT SERPL-CCNC: 21 U/L (ref 5–33)
AMORPH SED URNS QL MICRO: ABNORMAL
ANION GAP SERPL CALCULATED.3IONS-SCNC: 11 MMOL/L (ref 9–17)
AST SERPL-CCNC: 36 U/L
BILIRUB SERPL-MCNC: 0.4 MG/DL (ref 0.3–1.2)
BILIRUB UR QL STRIP: NEGATIVE
BUN SERPL-MCNC: 11 MG/DL (ref 6–20)
CALCIUM SERPL-MCNC: 9.3 MG/DL (ref 8.6–10.4)
CASTS #/AREA URNS LPF: ABNORMAL /LPF (ref 0–2)
CASTS #/AREA URNS LPF: ABNORMAL /LPF (ref 0–2)
CHLORIDE SERPL-SCNC: 105 MMOL/L (ref 98–107)
CLARITY UR: ABNORMAL
CO2 SERPL-SCNC: 26 MMOL/L (ref 20–31)
COLOR UR: YELLOW
CREAT SERPL-MCNC: 0.8 MG/DL (ref 0.5–0.9)
CRYSTALS URNS MICRO: ABNORMAL /HPF
CRYSTALS URNS MICRO: ABNORMAL /HPF
EPI CELLS #/AREA URNS HPF: ABNORMAL /HPF (ref 0–5)
ERYTHROCYTE [DISTWIDTH] IN BLOOD BY AUTOMATED COUNT: 13.2 % (ref 11.8–14.4)
GFR SERPL CREATININE-BSD FRML MDRD: >60 ML/MIN/1.73M2
GLUCOSE SERPL-MCNC: 100 MG/DL (ref 70–99)
GLUCOSE UR STRIP-MCNC: NEGATIVE MG/DL
HBV CORE AB SER QL: NONREACTIVE
HBV SURFACE AB SERPL IA-ACNC: 503.5 MIU/ML
HBV SURFACE AG SERPL QL IA: NONREACTIVE
HCG UR QL: NEGATIVE
HCT VFR BLD AUTO: 42.1 % (ref 36.3–47.1)
HCV AB SERPL QL IA: NONREACTIVE
HGB BLD-MCNC: 14.1 G/DL (ref 11.9–15.1)
HGB UR QL STRIP.AUTO: ABNORMAL
HIV 1+2 AB+HIV1 P24 AG SERPL QL IA: NONREACTIVE
KETONES UR STRIP-MCNC: NEGATIVE MG/DL
LEUKOCYTE ESTERASE UR QL STRIP: NEGATIVE
MCH RBC QN AUTO: 30.9 PG (ref 25.2–33.5)
MCHC RBC AUTO-ENTMCNC: 33.5 G/DL (ref 28.4–34.8)
MCV RBC AUTO: 92.3 FL (ref 82.6–102.9)
MUCOUS THREADS URNS QL MICRO: ABNORMAL
NITRITE UR QL STRIP: NEGATIVE
NRBC BLD-RTO: 0 PER 100 WBC
PH UR STRIP: 5.5 [PH] (ref 5–8)
PLATELET # BLD AUTO: 348 K/UL (ref 138–453)
PMV BLD AUTO: 9.2 FL (ref 8.1–13.5)
POTASSIUM SERPL-SCNC: 4 MMOL/L (ref 3.7–5.3)
PROT SERPL-MCNC: 6.5 G/DL (ref 6.4–8.3)
PROT UR STRIP-MCNC: ABNORMAL MG/DL
RBC # BLD AUTO: 4.56 M/UL (ref 3.95–5.11)
RBC #/AREA URNS HPF: ABNORMAL /HPF (ref 0–2)
SODIUM SERPL-SCNC: 142 MMOL/L (ref 135–144)
SP GR UR STRIP: 1.02 (ref 1–1.03)
UROBILINOGEN UR STRIP-ACNC: NORMAL EU/DL (ref 0–1)
WBC #/AREA URNS HPF: ABNORMAL /HPF (ref 0–5)
WBC OTHER # BLD: 11 K/UL (ref 3.5–11.3)

## 2023-09-03 PROCEDURE — 81001 URINALYSIS AUTO W/SCOPE: CPT

## 2023-09-03 PROCEDURE — 80053 COMPREHEN METABOLIC PANEL: CPT

## 2023-09-03 PROCEDURE — 81025 URINE PREGNANCY TEST: CPT

## 2023-09-03 PROCEDURE — 96372 THER/PROPH/DIAG INJ SC/IM: CPT

## 2023-09-03 PROCEDURE — 85027 COMPLETE CBC AUTOMATED: CPT

## 2023-09-03 PROCEDURE — 6360000002 HC RX W HCPCS: Performed by: PEDIATRICS

## 2023-09-03 PROCEDURE — 87389 HIV-1 AG W/HIV-1&-2 AB AG IA: CPT

## 2023-09-03 PROCEDURE — 86317 IMMUNOASSAY INFECTIOUS AGENT: CPT

## 2023-09-03 PROCEDURE — 86803 HEPATITIS C AB TEST: CPT

## 2023-09-03 PROCEDURE — 6370000000 HC RX 637 (ALT 250 FOR IP): Performed by: PEDIATRICS

## 2023-09-03 PROCEDURE — 87340 HEPATITIS B SURFACE AG IA: CPT

## 2023-09-03 PROCEDURE — 2580000003 HC RX 258

## 2023-09-03 PROCEDURE — 86704 HEP B CORE ANTIBODY TOTAL: CPT

## 2023-09-03 RX ORDER — CEFTRIAXONE 500 MG/1
500 INJECTION, POWDER, FOR SOLUTION INTRAMUSCULAR; INTRAVENOUS ONCE
Status: COMPLETED | OUTPATIENT
Start: 2023-09-03 | End: 2023-09-03

## 2023-09-03 RX ORDER — AZITHROMYCIN 250 MG/1
1000 TABLET, FILM COATED ORAL ONCE
Status: COMPLETED | OUTPATIENT
Start: 2023-09-03 | End: 2023-09-03

## 2023-09-03 RX ORDER — ALBUTEROL SULFATE 90 UG/1
2 AEROSOL, METERED RESPIRATORY (INHALATION) EVERY 6 HOURS PRN
Status: DISCONTINUED | OUTPATIENT
Start: 2023-09-03 | End: 2023-09-03 | Stop reason: HOSPADM

## 2023-09-03 RX ORDER — WATER 1000 ML/1000ML
INJECTION, SOLUTION INTRAVENOUS
Status: COMPLETED
Start: 2023-09-03 | End: 2023-09-03

## 2023-09-03 RX ORDER — METRONIDAZOLE 500 MG/1
2000 TABLET ORAL ONCE
Status: COMPLETED | OUTPATIENT
Start: 2023-09-03 | End: 2023-09-03

## 2023-09-03 RX ORDER — LEVONORGESTREL 1.5 MG/1
1.5 TABLET ORAL ONCE
Status: COMPLETED | OUTPATIENT
Start: 2023-09-03 | End: 2023-09-03

## 2023-09-03 RX ADMIN — LEVONORGESTREL 1.5 MG: 1.5 TABLET ORAL at 03:53

## 2023-09-03 RX ADMIN — AZITHROMYCIN 1000 MG: 250 TABLET, FILM COATED ORAL at 03:52

## 2023-09-03 RX ADMIN — WATER 1.8 ML: 1 INJECTION INTRAMUSCULAR; INTRAVENOUS; SUBCUTANEOUS at 03:56

## 2023-09-03 RX ADMIN — CEFTRIAXONE SODIUM 500 MG: 500 INJECTION, POWDER, FOR SOLUTION INTRAMUSCULAR; INTRAVENOUS at 03:53

## 2023-09-03 RX ADMIN — METRONIDAZOLE 2000 MG: 500 TABLET ORAL at 03:52

## 2023-09-03 NOTE — DISCHARGE INSTRUCTIONS
Follow up [facility] all [facility]. Please feel free return to the hospital if your symptoms worsen or any new concerning symptoms develop. Follow-up with your primary care physician as needed for all other the concerns.
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
## 2024-02-10 ENCOUNTER — HOSPITAL ENCOUNTER (EMERGENCY)
Age: 39
Discharge: HOME | End: 2024-02-10
Attending: EMERGENCY MEDICINE
Payer: COMMERCIAL

## 2024-02-10 VITALS
SYSTOLIC BLOOD PRESSURE: 114 MMHG | TEMPERATURE: 97.3 F | RESPIRATION RATE: 16 BRPM | HEART RATE: 70 BPM | OXYGEN SATURATION: 97 % | DIASTOLIC BLOOD PRESSURE: 76 MMHG

## 2024-02-10 DIAGNOSIS — S01.511A LIP LACERATION, INITIAL ENCOUNTER: Primary | ICD-10-CM

## 2024-02-10 PROCEDURE — 2500000003 HC RX 250 WO HCPCS: Performed by: STUDENT IN AN ORGANIZED HEALTH CARE EDUCATION/TRAINING PROGRAM

## 2024-02-10 PROCEDURE — 6360000002 HC RX W HCPCS: Performed by: STUDENT IN AN ORGANIZED HEALTH CARE EDUCATION/TRAINING PROGRAM

## 2024-02-10 PROCEDURE — 90471 IMMUNIZATION ADMIN: CPT | Performed by: STUDENT IN AN ORGANIZED HEALTH CARE EDUCATION/TRAINING PROGRAM

## 2024-02-10 PROCEDURE — 90715 TDAP VACCINE 7 YRS/> IM: CPT | Performed by: STUDENT IN AN ORGANIZED HEALTH CARE EDUCATION/TRAINING PROGRAM

## 2024-02-10 PROCEDURE — 99284 EMERGENCY DEPT VISIT MOD MDM: CPT

## 2024-02-10 PROCEDURE — 12011 RPR F/E/E/N/L/M 2.5 CM/<: CPT

## 2024-02-10 RX ORDER — IBUPROFEN 800 MG/1
800 TABLET ORAL EVERY 8 HOURS PRN
Qty: 10 TABLET | Refills: 0 | Status: SHIPPED | OUTPATIENT
Start: 2024-02-10

## 2024-02-10 RX ORDER — OXYCODONE HYDROCHLORIDE 5 MG/1
5 TABLET ORAL EVERY 6 HOURS PRN
Qty: 5 TABLET | Refills: 0 | Status: SHIPPED | OUTPATIENT
Start: 2024-02-10 | End: 2024-02-13

## 2024-02-10 RX ORDER — LIDOCAINE HYDROCHLORIDE 10 MG/ML
20 INJECTION, SOLUTION INFILTRATION; PERINEURAL ONCE
Status: COMPLETED | OUTPATIENT
Start: 2024-02-10 | End: 2024-02-10

## 2024-02-10 RX ADMIN — TETANUS TOXOID, REDUCED DIPHTHERIA TOXOID AND ACELLULAR PERTUSSIS VACCINE, ADSORBED 0.5 ML: 5; 2.5; 8; 8; 2.5 SUSPENSION INTRAMUSCULAR at 13:49

## 2024-02-10 RX ADMIN — LIDOCAINE HYDROCHLORIDE 20 ML: 10 INJECTION, SOLUTION INFILTRATION; PERINEURAL at 13:57

## 2024-02-10 ASSESSMENT — PAIN DESCRIPTION - LOCATION: LOCATION: HEAD

## 2024-02-10 ASSESSMENT — PAIN - FUNCTIONAL ASSESSMENT: PAIN_FUNCTIONAL_ASSESSMENT: 0-10

## 2024-02-10 ASSESSMENT — PAIN SCALES - GENERAL: PAINLEVEL_OUTOF10: 10

## 2024-02-10 NOTE — ED PROVIDER NOTES
[facility] ED     Emergency Department     Faculty Attestation        I performed a history and physical examination of the patient and discussed management with the resident. I reviewed the resident’s note and agree with the documented findings and plan of care. Any areas of disagreement are noted on the chart. I was personally present for the key portions of any procedures. I have documented in the chart those procedures where I was not present during the key portions. I have reviewed the emergency nurses triage note. I agree with the chief complaint, past medical history, past surgical history, allergies, medications, social and family history as documented unless otherwise noted below.  For Physician Assistant/ Nurse Practitioner cases/documentation I have personally evaluated this patient and have completed at least one if not all key elements of the E/M (history, physical exam, and MDM). Additional findings are as noted.      Vital Signs: BP: 114/76  Pulse: 70  Respirations: 16  Temp: 97.3 °F (36.3 °C) SpO2: 97 %  PCP:  No primary care provider on file.  Note Started: 2/10/24, 1:30 PM EST    Pertinent Comments:     Patient is a 38-year-old female with laceration to her left upper lip.   Patient was walking 2 dogs when the wind further head causing her to trip down 4 steps landing and biting her upper lip.   Does slightly involve the vermilion border but extends back into the mucosal soft tissues.   Does have some slight dental pain however no subluxations or loosening at all.   No obvious alveolar ridge fracture either.   Neurovascular intact surrounding.   Assessment/plan: Patient with laceration to the left upper lip will need suture repair.   Prophylactic antibiotic and follow-up for suture removal    Critical Care  None      (Please note that portions of this note were completed with a voice recognition program. Efforts were made to edit the

## 2024-02-10 NOTE — ED NOTES
Pt presents to ED via walking throug triage c/o fall today. Pt reports she fell while walking dogs. Pt presents with laceration to lip. Pt denies hitting head. Pt denies any LOC.   NAD noted.  Aox4 and ambulatory with steady and even gait.  
Pt provided with warm blankets  
yes

## 2024-02-10 NOTE — DISCHARGE INSTRUCTIONS
You have been seen in the ER today for facial laceration.  This was repaired with sutures.  You have 2 sutures that need to be taken out in approximately 5 to 7 days.  Please go see your primary care provider to have these stitches taken out.  If you begin to experience any symptoms such as chest pain shortness of breath nausea vomiting dizziness drowsiness abdominal pain loss of consciousness or any other symptoms you find concerning please return to the ED for follow-up evaluation.  If you have been given pain medication please take them only as prescribed. Do not take more medication than prescribed at any given time.  Please follow-up with your primary care provider within 3-5 days for continued care, sooner if you have concerns.

## 2024-02-10 NOTE — ED PROVIDER NOTES
[facility] ED  Emergency Department Encounter  Emergency Medicine Resident     Pt Name:Nicole Hartman  MRN: [id number]  Birthdate 1985  Date of evaluation: 2/10/24  PCP:  No primary care provider on file.  Note Started: 1:11 PM EST      CHIEF COMPLAINT       Chief Complaint   Patient presents with    Fall       HISTORY OF PRESENT ILLNESS  (Location/Symptom, Timing/Onset, Context/Setting, Quality, Duration, Modifying Factors, Severity.)      Nicole Hartman is a 38 y.o. female who presents with facial laceration.  Patient was pulled by her dogs, she fell, hit her face.  She did not lose consciousness.  She sustained a laceration to her lip.  Last tetanus shot is unknown.  No fevers chills nausea vomiting.  Patient is complaining of a mild headache but no blurry vision.    PAST MEDICAL / SURGICAL / SOCIAL / FAMILY HISTORY      has a past medical history of ADHD, Allergy, Aspiration pneumonia (HCC), Bipolar 1 disorder (HCC), Bipolar affective disorder, depressed, severe (HCC), Bipolar affective disorder, depressed, severe (HCC), Bronchitis, chronic (HCC), Cocaine abuse with intoxication with complication (HCC), Depression with suicidal ideation, Intermittent asthma without complication, Osteoarthritis, Osteoarthritis, Rh negative state in antepartum period, Schizophrenia (HCC), Seizures (HCC), and Spinal stenosis.       has a past surgical history that includes Cholecystectomy; hernia repair; Upper gastrointestinal endoscopy; Mandible fracture surgery; Dilation and curettage of uterus; hernia repair (2011); Dilation & curettage; Mandible surgery; hernia repair; and Dilation and curettage of uterus.      Social History     Socioeconomic History    Marital status:      Spouse name: Not on file    Number of children: Not on file    Years of education: Not on file    Highest education level: Not on file   Occupational History    Not on file   Tobacco Use    Smoking status: Every Day

## 2024-04-25 ENCOUNTER — APPOINTMENT (OUTPATIENT)
Dept: GENERAL RADIOLOGY | Age: 39
End: 2024-04-25
Payer: COMMERCIAL

## 2024-04-25 ENCOUNTER — HOSPITAL ENCOUNTER (EMERGENCY)
Age: 39
Discharge: HOME | End: 2024-04-25
Attending: EMERGENCY MEDICINE
Payer: COMMERCIAL

## 2024-04-25 VITALS
HEART RATE: 89 BPM | OXYGEN SATURATION: 100 % | WEIGHT: 147.71 LBS | BODY MASS INDEX: 23.84 KG/M2 | DIASTOLIC BLOOD PRESSURE: 72 MMHG | RESPIRATION RATE: 18 BRPM | TEMPERATURE: 97.8 F | SYSTOLIC BLOOD PRESSURE: 125 MMHG

## 2024-04-25 DIAGNOSIS — S39.012A BACK STRAIN, INITIAL ENCOUNTER: Primary | ICD-10-CM

## 2024-04-25 DIAGNOSIS — M62.838 SPASM OF MUSCLE: ICD-10-CM

## 2024-04-25 LAB — CHP ED QC CHECK: NORMAL

## 2024-04-25 PROCEDURE — 96372 THER/PROPH/DIAG INJ SC/IM: CPT

## 2024-04-25 PROCEDURE — 6370000000 HC RX 637 (ALT 250 FOR IP)

## 2024-04-25 PROCEDURE — 6360000002 HC RX W HCPCS

## 2024-04-25 PROCEDURE — 99284 EMERGENCY DEPT VISIT MOD MDM: CPT

## 2024-04-25 PROCEDURE — 72100 X-RAY EXAM L-S SPINE 2/3 VWS: CPT

## 2024-04-25 RX ORDER — CYCLOBENZAPRINE HCL 10 MG
10 TABLET ORAL ONCE
Status: COMPLETED | OUTPATIENT
Start: 2024-04-25 | End: 2024-04-25

## 2024-04-25 RX ORDER — KETOROLAC TROMETHAMINE 30 MG/ML
30 INJECTION, SOLUTION INTRAMUSCULAR; INTRAVENOUS ONCE
Status: COMPLETED | OUTPATIENT
Start: 2024-04-25 | End: 2024-04-25

## 2024-04-25 RX ORDER — LIDOCAINE 50 MG/G
OINTMENT TOPICAL
Qty: 50 G | Refills: 0 | Status: SHIPPED | OUTPATIENT
Start: 2024-04-25

## 2024-04-25 RX ORDER — CYCLOBENZAPRINE HCL 5 MG
5 TABLET ORAL 2 TIMES DAILY PRN
Qty: 10 TABLET | Refills: 0 | Status: SHIPPED | OUTPATIENT
Start: 2024-04-25 | End: 2024-05-05

## 2024-04-25 RX ORDER — LIDOCAINE 4 G/G
1 PATCH TOPICAL ONCE
Status: DISCONTINUED | OUTPATIENT
Start: 2024-04-25 | End: 2024-04-25 | Stop reason: HOSPADM

## 2024-04-25 RX ORDER — CYCLOBENZAPRINE HCL 5 MG
5 TABLET ORAL 2 TIMES DAILY PRN
Qty: 10 TABLET | Refills: 0 | Status: SHIPPED | OUTPATIENT
Start: 2024-04-25 | End: 2024-04-25

## 2024-04-25 RX ADMIN — KETOROLAC TROMETHAMINE 30 MG: 30 INJECTION, SOLUTION INTRAMUSCULAR; INTRAVENOUS at 12:17

## 2024-04-25 RX ADMIN — CYCLOBENZAPRINE 10 MG: 10 TABLET, FILM COATED ORAL at 12:19

## 2024-04-25 ASSESSMENT — PAIN - FUNCTIONAL ASSESSMENT: PAIN_FUNCTIONAL_ASSESSMENT: 0-10

## 2024-04-25 ASSESSMENT — PAIN SCALES - GENERAL
PAINLEVEL_OUTOF10: 3
PAINLEVEL_OUTOF10: 4
PAINLEVEL_OUTOF10: 7
PAINLEVEL_OUTOF10: 7

## 2024-04-25 ASSESSMENT — PAIN DESCRIPTION - DESCRIPTORS
DESCRIPTORS: SHARP
DESCRIPTORS: ACHING
DESCRIPTORS: ACHING
DESCRIPTORS: SHARP

## 2024-04-25 ASSESSMENT — PAIN DESCRIPTION - LOCATION
LOCATION: BACK

## 2024-04-25 NOTE — ED PROVIDER NOTES
[facility] ED     Emergency Department     Faculty Attestation    I performed a history and physical examination of the patient and discussed management with the resident. I reviewed the resident’s note and agree with the documented findings and plan of care. Any areas of disagreement are noted on the chart. I was personally present for the key portions of any procedures. I have documented in the chart those procedures where I was not present during the key portions. I have reviewed the emergency nurses triage note. I agree with the chief complaint, past medical history, past surgical history, allergies, medications, social and family history as documented unless otherwise noted below. For Physician Assistant/ Nurse Practitioner cases/documentation I have personally evaluated this patient and have completed at least one if not all key elements of the E/M (history, physical exam, and MDM). Additional findings are as noted.    Note Started: 12:06 PM EDT    Patient here with low back pain radiating to her left buttock 2 days no injury or trauma.  History of intermittent back issues.  Patient denies fevers, anticoagulant use, recent spinal injections or procedures, bowel or bladder changes, or IV drug abuse.  On exam no midline tenderness significant left paraspinal tenderness.  Abdomen soft nontender.  Strong pedal pulses bilaterally.  Patient has 5/5 strength bilaterally for hip flexion, knee flexion and extension, dorsiflexion, plantar flexion, and great toe extension. Intact sensation to light touch bilaterally all dermatomes in lower extremities.  Will medicate, plain films, plan discharge          Critical Care     none    Tay Sandhu MD, FACEP, FAAEM  Attending Emergency  Physician           Tay Sandhu MD  04/25/24 4797
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
## 2024-04-25 NOTE — ED PROVIDER NOTES
[facility] ED  Emergency Department Encounter  Emergency Medicine Resident     Pt Name:Nicole Hartman  MRN: [id number]  Birthdate 1985  Date of evaluation: 4/25/24  PCP:  No primary care provider on file.  Note Started: 12:01 PM EDT      CHIEF COMPLAINT       Chief Complaint   Patient presents with    Back Pain       HISTORY OF PRESENT ILLNESS  (Location/Symptom, Timing/Onset, Context/Setting, Quality, Duration, Modifying Factors, Severity.)      Nicole Hartman is a 38yo female that presents to the ED with complaints of back pain for the past 48. Patient reports no traumatic injury or heavy lifting at the time of the pain. She states that she woke up and started having lower back pain that is radiating down the LLE with intermittent electrical sensation. Patient has been applying hot packs and taking Tylenol at regular intervals with no improvement in the pain. She denies any focal weakness, CP, SOB, Abd pain, change in skin color.     Patient states she had her fallopian tubes removed. States she is not pregnant.    Patient has a known Hx of spinal stenosis and bulging discs.    Patient smokes 1/2 pack of cigarettes per day. No reported recent recreational drug usage. No recent alcohol    PAST MEDICAL / SURGICAL / SOCIAL / FAMILY HISTORY      has a past medical history of ADHD, Allergy, Aspiration pneumonia (HCC), Bipolar 1 disorder (HCC), Bipolar affective disorder, depressed, severe (HCC), Bipolar affective disorder, depressed, severe (HCC), Bronchitis, chronic (HCC), Cocaine abuse with intoxication with complication (HCC), Depression with suicidal ideation, Intermittent asthma without complication, Osteoarthritis, Osteoarthritis, Rh negative state in antepartum period, Schizophrenia (HCC), Seizures (HCC), and Spinal stenosis.       has a past surgical history that includes Cholecystectomy; hernia repair; Upper gastrointestinal endoscopy; Mandible fracture surgery; Dilation and  Noé Antoine,    ibuprofen (IBU) 800 MG tablet Take 1 tablet by mouth every 8 hours as needed for Pain 2/10/24   Mohinder Lynn,    FLUoxetine (PROZAC) 10 MG capsule Take 1 capsule by mouth in the morning. 8/5/22   Jose Manuel Gomez MD   lidocaine 4 % external patch Place 1 patch onto the skin in the morning. 8/5/22   Jose Manuel Gomez MD   OLANZapine (ZYPREXA) 10 MG tablet Take 1 tablet by mouth nightly 8/4/22   Jose Manuel Gomez MD   traZODone (DESYREL) 50 MG tablet Take 1 tablet by mouth nightly as needed for Sleep 8/4/22   Jose Manuel Gomez MD   polyethylene glycol (MIRALAX) 17 GM/SCOOP powder Take 17 g by mouth daily 7/5/22   Alida Sainz, APRN - CNP   albuterol sulfate HFA (PROVENTIL HFA) 108 (90 Base) MCG/ACT inhaler Inhale 2 puffs into the lungs every 6 hours as needed for Wheezing or Shortness of Breath 6/7/22 8/4/22  Alida Sainz, APRN - CNP         REVIEW OF SYSTEMS       Review of Systems  See HPI    PHYSICAL EXAM      INITIAL VITALS:   /72   Pulse 89   Temp 97.8 °F (36.6 °C) (Oral)   Resp 18   Wt 67 kg (147 lb 11.3 oz)   SpO2 100%   BMI 23.84 kg/m²     Physical Exam  GCS15, Aox4, PERRL, EOMI, HRRR, Lungs CTA, Abd: S, ND, NTTP. Paraspinal lumbar discomfort on the Left side. (+) sciatica pain to palpation over the L piriformis muscle. Palpable radial pulses and DP pulses. Neurovascularly intact in the bilateral lower extremities. No focal weakness. Strength 5/5 bilaterally.    DDX/DIAGNOSTIC RESULTS / EMERGENCY DEPARTMENT COURSE / MDM     Medical Decision Making  See ED course    Amount and/or Complexity of Data Reviewed  Labs: ordered.  Radiology: ordered.    Risk  Prescription drug management.        EKG      All EKG's are interpreted by the Emergency Department Physician who either signs or Co-signs this chart in the absence of a cardiologist.    EMERGENCY DEPARTMENT COURSE:    ED Course as of 04/25/24 2019   Thu Apr 25, 2024   1200 Patient is a 38yo female that presents

## 2024-04-25 NOTE — ED NOTES
C/o left lower back pain x 3 days with no recent heavy lifting or trauma, no falls. States she suffers from \" bulging disks\" . Denies numbness to legs but states pain is constant and does shoot down the left leg with no loss of bowel and bladder. Taking tylenol OTC for pain with no relief. Denies dysuria . No issues with urination. Ambulated  to ED with steady gait but states that she has been lying flat in bed for last few days using heating pad which does provide relief. Using marijuana for pain with no relief. Assessment complete.

## 2024-04-25 NOTE — DISCHARGE INSTRUCTIONS
-You were seen and evaluated by emergency medicine physicians at [facility].    -Please follow-up with your primary care physician and/or with the referrals to specialist.    -You were diagnosed with: Muscle spasms, back pain, sciatica    - X-rays negative for any acute finding. Please follow up with your PCP for better management and follow up on your chronic back pain.  - A course of muscle relaxants has been sent to the pharmacy listed in your DC paperwork  - Please perform the stretching exercises to help with your back and gluteal pain.    -Please return to the Emergency Department if you are experiencing the following symptoms acutely: Worsening back pain, numbness or weakness in your lower limbs, Headache, fever, chills, nausea, vomiting, chest pain, shortness of breath, abdominal pain, change with urination, change with bowel movements, change in your skin/hair/nail, weakness, fatigue, altered mental status and/or any change from baseline health.    -Thank you for coming to [facility].

## 2024-04-25 NOTE — ED NOTES
Called nurses station to be taken off monitor to use restroom for sample and then when she returned states she \" forgot \" to pee in the cup so no sample obtained. Provided water so she can provide a sample

## 2024-06-02 ENCOUNTER — HOSPITAL ENCOUNTER (EMERGENCY)
Age: 39
Discharge: LEFT BEFORE DISCHARGE | End: 2024-06-02

## 2024-08-03 ENCOUNTER — HOSPITAL ENCOUNTER (EMERGENCY)
Age: 39
Discharge: HOME | End: 2024-08-03
Attending: EMERGENCY MEDICINE
Payer: COMMERCIAL

## 2024-08-03 ENCOUNTER — APPOINTMENT (OUTPATIENT)
Dept: GENERAL RADIOLOGY | Age: 39
End: 2024-08-03
Payer: COMMERCIAL

## 2024-08-03 VITALS
HEIGHT: 66 IN | TEMPERATURE: 98.7 F | BODY MASS INDEX: 22.82 KG/M2 | DIASTOLIC BLOOD PRESSURE: 74 MMHG | WEIGHT: 142 LBS | HEART RATE: 87 BPM | OXYGEN SATURATION: 99 % | RESPIRATION RATE: 17 BRPM | SYSTOLIC BLOOD PRESSURE: 120 MMHG

## 2024-08-03 DIAGNOSIS — M25.511 ACUTE PAIN OF RIGHT SHOULDER: Primary | ICD-10-CM

## 2024-08-03 PROCEDURE — 73030 X-RAY EXAM OF SHOULDER: CPT

## 2024-08-03 PROCEDURE — 99283 EMERGENCY DEPT VISIT LOW MDM: CPT

## 2024-08-03 PROCEDURE — 73090 X-RAY EXAM OF FOREARM: CPT

## 2024-08-03 ASSESSMENT — LIFESTYLE VARIABLES
HOW OFTEN DO YOU HAVE A DRINK CONTAINING ALCOHOL: 2-4 TIMES A MONTH
HOW MANY STANDARD DRINKS CONTAINING ALCOHOL DO YOU HAVE ON A TYPICAL DAY: 1 OR 2

## 2024-08-03 ASSESSMENT — PAIN DESCRIPTION - PAIN TYPE: TYPE: ACUTE PAIN

## 2024-08-03 ASSESSMENT — PAIN DESCRIPTION - FREQUENCY: FREQUENCY: INTERMITTENT

## 2024-08-03 ASSESSMENT — PAIN DESCRIPTION - ORIENTATION: ORIENTATION: RIGHT

## 2024-08-03 ASSESSMENT — PAIN DESCRIPTION - DESCRIPTORS: DESCRIPTORS: SORE

## 2024-08-03 ASSESSMENT — PAIN SCALES - GENERAL: PAINLEVEL_OUTOF10: 6

## 2024-08-03 ASSESSMENT — PAIN DESCRIPTION - LOCATION: LOCATION: SHOULDER

## 2024-08-03 ASSESSMENT — PAIN - FUNCTIONAL ASSESSMENT: PAIN_FUNCTIONAL_ASSESSMENT: NONE - DENIES PAIN

## 2024-08-03 NOTE — ED PROVIDER NOTES
I signed up for this patient in error, I have not evaluated or seen this patient.    Navid Rudolph,  ED Resident

## 2024-08-03 NOTE — ED TRIAGE NOTES
Patient her for right shoulder pain due to possible dislocation, x 2 today patient reports   Patient also reports she having some difficulty with raising that arm up bennett

## 2024-08-03 NOTE — ED PROVIDER NOTES
[facility] ED  eMERGENCY dEPARTMENT eNCOUnter   Attending Attestation     Pt Name: Nicole Hartman  MRN: [id number]  Birthdate 1985  Date of evaluation: 8/3/24       Nicole Hartman is a 39 y.o. female who presents with Shoulder Pain      4:15 PM EDT      History: Pt presents with Right shoulder pain . Pt states she feels like she dislocated it twice today. She has multiple episodes of shoulder dislocation in the past. Pt states it hurts and she is feeling like it might be still dislocated.     Exam:HRRR, lungs CTABL, abdomen soft and non tender. Pt well appearing. Pt has tenderness over the right shoulder.     Plan for xray shoulder and discharge with appropriate followup. IF there is dislocation, will plan for reduction.       I performed a history and physical examination of the patient and discussed management with the resident. I reviewed the resident’s note and agree with the documented findings and plan of care. Any areas of disagreement are noted on the chart. I was personally present for the key portions of any procedures. I have documented in the chart those procedures where I was not present during the key portions. I have personally reviewed all images and agree with the resident's interpretation. I have reviewed the emergency nurses triage note. I agree with the chief complaint, past medical history, past surgical history, allergies, medications, social and family history as documented unless otherwise noted below. Documentation of the HPI, Physical Exam and Medical Decision Making performed by medical students or scribes is based on my personal performance of the HPI, PE and MDM. For Phys Assistant/ Nurse Practitioner cases/documentation I have had a face to face evaluation of this patient and have completed at least one if not all key elements of the E/M (history, physical exam, and MDM). Additional findings are as noted.    For APC cases I have personally evaluated and

## 2024-08-03 NOTE — ED PROVIDER NOTES
[facility] ED  Emergency Department Encounter  Emergency Medicine Resident     Pt Name:Nicole Hartman  MRN: [id number]  Birthdate 1985  Date of evaluation: 8/3/24  PCP:  No primary care provider on file.  Note Started: 3:50 PM EDT      CHIEF COMPLAINT       Chief Complaint   Patient presents with    Shoulder Pain       HISTORY OF PRESENT ILLNESS  (Location/Symptom, Timing/Onset, Context/Setting, Quality, Duration, Modifying Factors, Severity.)      Nicole Hartman is a right-hand-dominant 39 y.o. female who presents with right shoulder pain.  Patient explains that her right shoulder dislocates spontaneously.  This has been ongoing for a while and she is following with orthopedic surgery to have a shoulder replacement performed.  Patient explains that her right shoulder spontaneously dislocated twice today, and she is unsure if it reduced fully the last time.  She is having some associated pain and difficulty with movement.  She denies numbness, weakness, tingling.  Denies any pain in her right elbow, wrist, hand.  Patient does report some generalized pain in her right forearm.  No other complaints expressed at this time.    PAST MEDICAL / SURGICAL / SOCIAL / FAMILY HISTORY      has a past medical history of ADHD, Allergy, Aspiration pneumonia (HCC), Bipolar 1 disorder (HCC), Bipolar affective disorder, depressed, severe (HCC), Bipolar affective disorder, depressed, severe (HCC), Bronchitis, chronic (HCC), Cocaine abuse with intoxication with complication (HCC), Depression with suicidal ideation, Intermittent asthma without complication, Osteoarthritis, Osteoarthritis, Rh negative state in antepartum period, Schizophrenia (HCC), Seizures (HCC), and Spinal stenosis.       has a past surgical history that includes Cholecystectomy; hernia repair; Upper gastrointestinal endoscopy; Mandible fracture surgery; Dilation and curettage of uterus; hernia repair (2011); Dilation & curettage;    Jose Manuel Gomez MD   lidocaine 4 % external patch Place 1 patch onto the skin in the morning. 8/5/22   Jose Manuel Gomez MD   OLANZapine (ZYPREXA) 10 MG tablet Take 1 tablet by mouth nightly 8/4/22   Jose Manuel Gomez MD   traZODone (DESYREL) 50 MG tablet Take 1 tablet by mouth nightly as needed for Sleep 8/4/22   Jose Manuel Gomez MD   polyethylene glycol (MIRALAX) 17 GM/SCOOP powder Take 17 g by mouth daily 7/5/22   Alida Sainz, APRN - CNP   albuterol sulfate HFA (PROVENTIL HFA) 108 (90 Base) MCG/ACT inhaler Inhale 2 puffs into the lungs every 6 hours as needed for Wheezing or Shortness of Breath 6/7/22 8/4/22  Alida Sainz, APRN - CNP       REVIEW OF SYSTEMS       Review of Systems   Musculoskeletal:  Positive for arthralgias and myalgias.   Skin:  Negative for wound.     PHYSICAL EXAM      INITIAL VITALS:   /74   Pulse 87   Temp 98.7 °F (37.1 °C) (Oral)   Resp 17   Ht 1.676 m (5' 6\")   Wt 64.4 kg (142 lb)   SpO2 99%   BMI 22.92 kg/m²     Physical Exam  Constitutional:       General: She is not in acute distress.     Appearance: Normal appearance.   HENT:      Head: Normocephalic and atraumatic.      Right Ear: External ear normal.      Left Ear: External ear normal.      Nose: Nose normal.      Mouth/Throat:      Pharynx: Oropharynx is clear.   Eyes:      Conjunctiva/sclera: Conjunctivae normal.   Pulmonary:      Effort: Pulmonary effort is normal.   Musculoskeletal:      Right shoulder: Swelling present. No deformity.      Right forearm: Tenderness present.      Cervical back: Normal range of motion.      Comments: Small amount of swelling surrounding the right shoulder but no obvious deformity.  Patient is able to forward flex and AB duct to the arm but is limited secondary to pain.  Neurovascularly intact distally.  Full range of motion of the right hand and wrist.  Generalized tenderness with palpation of the right forearm.   Skin:     General: Skin is warm and dry.   Neurological:

## 2024-08-03 NOTE — DISCHARGE INSTRUCTIONS
Thank you for visiting [facility] Emergency Department.    You need to follow up with your Orthopedic doctor to discuss a shoulder replacement for ongoing symptoms.     Should you have any questions regarding your care or further treatment, please call [facility] Emergency Department at [phone].

## 2024-11-02 ENCOUNTER — HOSPITAL ENCOUNTER (INPATIENT)
Age: 39
LOS: 3 days | Discharge: HOME | DRG: 885 | End: 2024-11-05
Attending: STUDENT IN AN ORGANIZED HEALTH CARE EDUCATION/TRAINING PROGRAM | Admitting: PSYCHIATRY & NEUROLOGY
Payer: COMMERCIAL

## 2024-11-02 DIAGNOSIS — R45.851 SUICIDAL IDEATION: Primary | ICD-10-CM

## 2024-11-02 LAB
ALBUMIN SERPL-MCNC: 4.7 G/DL (ref 3.5–5.2)
ALP SERPL-CCNC: 71 U/L (ref 35–104)
ALT SERPL-CCNC: 16 U/L (ref 10–35)
AMPHET UR QL SCN: NEGATIVE
ANION GAP SERPL CALCULATED.3IONS-SCNC: 12 MMOL/L (ref 9–16)
AST SERPL-CCNC: 33 U/L (ref 10–35)
BACTERIA URNS QL MICRO: ABNORMAL
BARBITURATES UR QL SCN: NEGATIVE
BASOPHILS # BLD: 0 K/UL (ref 0–0.2)
BASOPHILS NFR BLD: 0 % (ref 0–2)
BENZODIAZ UR QL: NEGATIVE
BILIRUB SERPL-MCNC: 0.7 MG/DL (ref 0–1.2)
BILIRUB UR QL STRIP: NEGATIVE
BUN SERPL-MCNC: 10 MG/DL (ref 6–20)
CALCIUM SERPL-MCNC: 9.5 MG/DL (ref 8.6–10.4)
CANNABINOIDS UR QL SCN: POSITIVE
CHLORIDE SERPL-SCNC: 99 MMOL/L (ref 98–107)
CLARITY UR: ABNORMAL
CO2 SERPL-SCNC: 24 MMOL/L (ref 20–31)
COCAINE UR QL SCN: POSITIVE
COLOR UR: ABNORMAL
CREAT SERPL-MCNC: 0.9 MG/DL (ref 0.7–1.2)
CRYSTALS URNS MICRO: ABNORMAL /HPF
CRYSTALS URNS MICRO: ABNORMAL /HPF
EOSINOPHIL # BLD: 0.2 K/UL (ref 0–0.4)
EOSINOPHILS RELATIVE PERCENT: 1 % (ref 0–4)
EPI CELLS #/AREA URNS HPF: ABNORMAL /HPF
ERYTHROCYTE [DISTWIDTH] IN BLOOD BY AUTOMATED COUNT: 13.4 % (ref 11.5–14.9)
ETHANOL PERCENT: NORMAL %
ETHANOLAMINE SERPL-MCNC: <10 MG/DL (ref 0–0.08)
FENTANYL UR QL: NEGATIVE
GFR, ESTIMATED: 84 ML/MIN/1.73M2
GLUCOSE SERPL-MCNC: 106 MG/DL (ref 74–99)
GLUCOSE UR STRIP-MCNC: NEGATIVE MG/DL
HCG SERPL QL: NEGATIVE
HCT VFR BLD AUTO: 42.2 % (ref 36–46)
HGB BLD-MCNC: 15.1 G/DL (ref 12–16)
HGB UR QL STRIP.AUTO: ABNORMAL
KETONES UR STRIP-MCNC: ABNORMAL MG/DL
LEUKOCYTE ESTERASE UR QL STRIP: ABNORMAL
LYMPHOCYTES NFR BLD: 2.2 K/UL (ref 1–4.8)
LYMPHOCYTES RELATIVE PERCENT: 15 % (ref 24–44)
MCH RBC QN AUTO: 32.2 PG (ref 26–34)
MCHC RBC AUTO-ENTMCNC: 35.8 G/DL (ref 31–37)
MCV RBC AUTO: 90 FL (ref 80–100)
METHADONE UR QL: NEGATIVE
MONOCYTES NFR BLD: 1.6 K/UL (ref 0.1–1.3)
MONOCYTES NFR BLD: 11 % (ref 1–7)
NEUTROPHILS NFR BLD: 73 % (ref 36–66)
NEUTS SEG NFR BLD: 11.1 K/UL (ref 1.3–9.1)
NITRITE UR QL STRIP: NEGATIVE
OPIATES UR QL SCN: NEGATIVE
OXYCODONE UR QL SCN: NEGATIVE
PCP UR QL SCN: NEGATIVE
PH UR STRIP: 5 [PH] (ref 5–8)
PLATELET # BLD AUTO: 372 K/UL (ref 150–450)
PMV BLD AUTO: 7.7 FL (ref 6–12)
POTASSIUM SERPL-SCNC: 3.7 MMOL/L (ref 3.7–5.3)
PROT SERPL-MCNC: 7.7 G/DL (ref 6.6–8.7)
PROT UR STRIP-MCNC: ABNORMAL MG/DL
RBC # BLD AUTO: 4.69 M/UL (ref 4–5.2)
RBC #/AREA URNS HPF: ABNORMAL /HPF
SODIUM SERPL-SCNC: 135 MMOL/L (ref 136–145)
SP GR UR STRIP: 1.03 (ref 1–1.03)
TEST INFORMATION: ABNORMAL
UROBILINOGEN UR STRIP-ACNC: NORMAL EU/DL (ref 0–1)
WBC #/AREA URNS HPF: ABNORMAL /HPF
WBC OTHER # BLD: 15.1 K/UL (ref 3.5–11)

## 2024-11-02 PROCEDURE — 6370000000 HC RX 637 (ALT 250 FOR IP): Performed by: STUDENT IN AN ORGANIZED HEALTH CARE EDUCATION/TRAINING PROGRAM

## 2024-11-02 PROCEDURE — 80053 COMPREHEN METABOLIC PANEL: CPT

## 2024-11-02 PROCEDURE — 84703 CHORIONIC GONADOTROPIN ASSAY: CPT

## 2024-11-02 PROCEDURE — 36415 COLL VENOUS BLD VENIPUNCTURE: CPT

## 2024-11-02 PROCEDURE — APPSS60 APP SPLIT SHARED TIME 46-60 MINUTES

## 2024-11-02 PROCEDURE — 6370000000 HC RX 637 (ALT 250 FOR IP): Performed by: PSYCHIATRY & NEUROLOGY

## 2024-11-02 PROCEDURE — 80307 DRUG TEST PRSMV CHEM ANLYZR: CPT

## 2024-11-02 PROCEDURE — 99223 1ST HOSP IP/OBS HIGH 75: CPT | Performed by: PSYCHIATRY & NEUROLOGY

## 2024-11-02 PROCEDURE — 6370000000 HC RX 637 (ALT 250 FOR IP): Performed by: INTERNAL MEDICINE

## 2024-11-02 PROCEDURE — 81001 URINALYSIS AUTO W/SCOPE: CPT

## 2024-11-02 PROCEDURE — 85025 COMPLETE CBC W/AUTO DIFF WBC: CPT

## 2024-11-02 PROCEDURE — 1240000000 HC EMOTIONAL WELLNESS R&B

## 2024-11-02 PROCEDURE — G0480 DRUG TEST DEF 1-7 CLASSES: HCPCS

## 2024-11-02 PROCEDURE — 99285 EMERGENCY DEPT VISIT HI MDM: CPT

## 2024-11-02 PROCEDURE — 99222 1ST HOSP IP/OBS MODERATE 55: CPT | Performed by: INTERNAL MEDICINE

## 2024-11-02 RX ORDER — HALOPERIDOL 5 MG/ML
5 INJECTION INTRAMUSCULAR EVERY 6 HOURS PRN
Status: DISCONTINUED | OUTPATIENT
Start: 2024-11-02 | End: 2024-11-05 | Stop reason: HOSPADM

## 2024-11-02 RX ORDER — HALOPERIDOL 5 MG/1
5 TABLET ORAL EVERY 6 HOURS PRN
Status: DISCONTINUED | OUTPATIENT
Start: 2024-11-02 | End: 2024-11-05 | Stop reason: HOSPADM

## 2024-11-02 RX ORDER — TRAZODONE HYDROCHLORIDE 50 MG/1
50 TABLET, FILM COATED ORAL NIGHTLY PRN
Status: DISCONTINUED | OUTPATIENT
Start: 2024-11-02 | End: 2024-11-05 | Stop reason: HOSPADM

## 2024-11-02 RX ORDER — SENNA AND DOCUSATE SODIUM 50; 8.6 MG/1; MG/1
2 TABLET, FILM COATED ORAL DAILY PRN
Status: DISCONTINUED | OUTPATIENT
Start: 2024-11-02 | End: 2024-11-05 | Stop reason: HOSPADM

## 2024-11-02 RX ORDER — POLYETHYLENE GLYCOL 3350 17 G
2 POWDER IN PACKET (EA) ORAL
Status: DISCONTINUED | OUTPATIENT
Start: 2024-11-02 | End: 2024-11-02

## 2024-11-02 RX ORDER — NICOTINE 21 MG/24HR
1 PATCH, TRANSDERMAL 24 HOURS TRANSDERMAL DAILY
Status: DISCONTINUED | OUTPATIENT
Start: 2024-11-02 | End: 2024-11-05 | Stop reason: HOSPADM

## 2024-11-02 RX ORDER — MAGNESIUM HYDROXIDE/ALUMINUM HYDROXICE/SIMETHICONE 120; 1200; 1200 MG/30ML; MG/30ML; MG/30ML
30 SUSPENSION ORAL EVERY 6 HOURS PRN
Status: DISCONTINUED | OUTPATIENT
Start: 2024-11-02 | End: 2024-11-05 | Stop reason: HOSPADM

## 2024-11-02 RX ORDER — ACETAMINOPHEN 325 MG/1
650 TABLET ORAL EVERY 6 HOURS PRN
Status: DISCONTINUED | OUTPATIENT
Start: 2024-11-02 | End: 2024-11-05 | Stop reason: HOSPADM

## 2024-11-02 RX ORDER — POLYETHYLENE GLYCOL 3350 17 G/17G
17 POWDER, FOR SOLUTION ORAL DAILY PRN
Status: DISCONTINUED | OUTPATIENT
Start: 2024-11-02 | End: 2024-11-05 | Stop reason: HOSPADM

## 2024-11-02 RX ORDER — LORAZEPAM 1 MG/1
2 TABLET ORAL EVERY 6 HOURS PRN
Status: DISCONTINUED | OUTPATIENT
Start: 2024-11-02 | End: 2024-11-05 | Stop reason: HOSPADM

## 2024-11-02 RX ORDER — FLUOXETINE 10 MG/1
10 CAPSULE ORAL DAILY
Status: DISCONTINUED | OUTPATIENT
Start: 2024-11-03 | End: 2024-11-03

## 2024-11-02 RX ORDER — IBUPROFEN 400 MG/1
400 TABLET, FILM COATED ORAL EVERY 6 HOURS PRN
Status: DISCONTINUED | OUTPATIENT
Start: 2024-11-02 | End: 2024-11-05 | Stop reason: HOSPADM

## 2024-11-02 RX ORDER — OLANZAPINE 10 MG/1
10 TABLET ORAL NIGHTLY
Status: DISCONTINUED | OUTPATIENT
Start: 2024-11-03 | End: 2024-11-03

## 2024-11-02 RX ORDER — HYDROXYZINE HYDROCHLORIDE 50 MG/1
50 TABLET, FILM COATED ORAL 3 TIMES DAILY PRN
Status: DISCONTINUED | OUTPATIENT
Start: 2024-11-02 | End: 2024-11-05 | Stop reason: HOSPADM

## 2024-11-02 RX ORDER — LORAZEPAM 2 MG/ML
2 INJECTION INTRAMUSCULAR EVERY 6 HOURS PRN
Status: DISCONTINUED | OUTPATIENT
Start: 2024-11-02 | End: 2024-11-05 | Stop reason: HOSPADM

## 2024-11-02 RX ORDER — DIPHENHYDRAMINE HYDROCHLORIDE 50 MG/ML
50 INJECTION INTRAMUSCULAR; INTRAVENOUS EVERY 6 HOURS PRN
Status: DISCONTINUED | OUTPATIENT
Start: 2024-11-02 | End: 2024-11-05 | Stop reason: HOSPADM

## 2024-11-02 RX ADMIN — HYDROXYZINE HYDROCHLORIDE 50 MG: 50 TABLET, FILM COATED ORAL at 20:52

## 2024-11-02 RX ADMIN — CEPHALEXIN 250 MG: 250 CAPSULE ORAL at 13:37

## 2024-11-02 RX ADMIN — CEPHALEXIN 250 MG: 250 CAPSULE ORAL at 17:35

## 2024-11-02 RX ADMIN — TRAZODONE HYDROCHLORIDE 50 MG: 50 TABLET ORAL at 20:52

## 2024-11-02 RX ADMIN — CEPHALEXIN 500 MG: 250 CAPSULE ORAL at 08:55

## 2024-11-02 ASSESSMENT — LIFESTYLE VARIABLES
HOW OFTEN DO YOU HAVE A DRINK CONTAINING ALCOHOL: 2-4 TIMES A MONTH
HOW OFTEN DO YOU HAVE A DRINK CONTAINING ALCOHOL: NEVER
HOW MANY STANDARD DRINKS CONTAINING ALCOHOL DO YOU HAVE ON A TYPICAL DAY: PATIENT DOES NOT DRINK
HOW MANY STANDARD DRINKS CONTAINING ALCOHOL DO YOU HAVE ON A TYPICAL DAY: 1 OR 2

## 2024-11-02 ASSESSMENT — SLEEP AND FATIGUE QUESTIONNAIRES
AVERAGE NUMBER OF SLEEP HOURS: 2
SLEEP PATTERN: DIFFICULTY FALLING ASLEEP;EARLY AWAKENING;DISTURBED/INTERRUPTED SLEEP
DO YOU HAVE DIFFICULTY SLEEPING: YES
DO YOU USE A SLEEP AID: NO

## 2024-11-02 ASSESSMENT — PATIENT HEALTH QUESTIONNAIRE - PHQ9
2. FEELING DOWN, DEPRESSED OR HOPELESS: NOT AT ALL
SUM OF ALL RESPONSES TO PHQ QUESTIONS 1-9: 0
SUM OF ALL RESPONSES TO PHQ9 QUESTIONS 1 & 2: 0
1. LITTLE INTEREST OR PLEASURE IN DOING THINGS: NOT AT ALL
SUM OF ALL RESPONSES TO PHQ QUESTIONS 1-9: 0

## 2024-11-02 ASSESSMENT — PAIN - FUNCTIONAL ASSESSMENT: PAIN_FUNCTIONAL_ASSESSMENT: NONE - DENIES PAIN
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
## 2024-11-02 NOTE — ED PROVIDER NOTES
Diagnosis: Depression, suicidal ideation psychosis    Diagnoses Considered but Do Not Suspect:  medical derangmenet    Pertinent Comorbid Conditions: See history    2)  Data Reviewed and Analyzed  (Lab and radiology tests/orders below in next section)    External Documents Reviewed: Medical history        3)  Treatment and Disposition             39-year-old presenting with suicidal ideation with plan    Patient will be medically cleared and admitted to psychiatry    Discussed with  Saira          DATA FOR LAB AND RADIOLOGY TESTS ORDERED BELOW ARE REVIEWED BY THE ED CLINICIAN:    RADIOLOGY: All x-rays, CT, MRI, and formal ultrasound images (except ED bedside ultrasound) are read by the radiologist, see reports below, unless otherwise noted in MDM or here.  Reports below are reviewed by myself.  No orders to display       LABS: Lab orders shown below, the results are reviewed by myself, and all abnormals are listed below.  Labs Reviewed   CBC WITH AUTO DIFFERENTIAL - Abnormal; Notable for the following components:       Result Value    WBC 15.1 (*)     Neutrophils % 73 (*)     Lymphocytes % 15 (*)     Monocytes % 11 (*)     Neutrophils Absolute 11.10 (*)     Monocytes Absolute 1.60 (*)     All other components within normal limits   URINALYSIS WITH REFLEX TO CULTURE - Abnormal; Notable for the following components:    Color, UA Dark Yellow (*)     Turbidity UA Cloudy (*)     Ketones, Urine SMALL (*)     Specific Gravity, UA 1.031 (*)     Urine Hgb LARGE (*)     Protein, UA 1+ (*)     Leukocyte Esterase, Urine TRACE (*)     All other components within normal limits   HCG, SERUM, QUALITATIVE   COMPREHENSIVE METABOLIC PANEL   ETHANOL   URINE DRUG SCREEN   MICROSCOPIC URINALYSIS       Vitals Reviewed:    Vitals:    11/02/24 0612   BP: (!) 142/88   Pulse: 99   Resp: 15   Temp: 98.6 °F (37 °C)   TempSrc: Oral   SpO2: 99%     MEDICATIONS GIVEN TO PATIENT THIS ENCOUNTER:  No orders of the defined types were

## 2024-11-02 NOTE — BH NOTE
Patient arrived on unit transported by two staff members via wheelchair. Patient ambulated independently was assessed, vitals obtained, wanded, changed into appropriate clothing and oriented to unit.
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
## 2024-11-02 NOTE — H&P
Department of Psychiatry  Attending Physician Psychiatric Assessment   Patient: Nicole Hartman  MRN: [id number]  Reason for Admission to Psychiatric Unit:  Threat to self requiring 24 hour professional observation  Concerns about patient's safety in the community  Past Mental Health Diagnosis: a history of  Bipolar Disorder and Alcohol and/or Drug Use Disorder  Triggering event or precipitating factor: Financial instability, Alcohol/Drug Relapse, and Relationship Issues  Length of time/duration of triggering event: Weeks  Legal Status: Voluntary    CHIEF COMPLAINT:  Depression with suicidal ideation    History obtained from: Patient, electronic medical record          HISTORY OF PRESENT ILLNESS:    Nicole Hartman is a 39 y.o. female who has a past medical history of mental illness, polysubstance abuse, and asthma who presents to the ER with increased depression and suicidal ideation with a plan to cut herself with a knife.  Nicole was last admitted to this facility in August of 2022.  At that time she was discharged on combination of Prozac and Zyprexa.  Nicole is seen in unit sensory room today.  She is tearful at times.  She reports feeling overwhelmed recently and didn't know what to do anymore.  She reports she has a fiance and that at one point during their two year relationship they were on a break.  During that break, patient's fiance got another woman pregnant.  The baby is now 1.5 years old and last week the baby's mother left the baby.  Nicole reports on Wednesday they started taking care of the baby since the mother had left.  She reports she already has 4 children of her own and now she is taking care of this infant and it has been extremely stressful.  She reports \"I just have a lot going on.\"  Nicole reports that for the past couple of weeks she has been significantly down and depressed, all day, nearly everyday.  She endorses very poor sleep and states she has \"insomnia.\"  She reports difficulty 
cocaine complains of chronic constipation on Linzess at home denies any bowel obstruction no nausea vomiting no fever chills no significant weight loss or weight gain no history of hyper hypothyroidism  Constipation will add senna and Colace  As needed Linzess 145   UA suggestive of UTI empiric Keflex  DVT prophylaxis,  Pt mobile   Full code status       Consultations:   IP CONSULT TO INTERNAL MEDICINE      Ervin Mcdowell MD  11/2/2024  1:26 PM    Copy sent to Dr. Arreola primary care provider on file.    Please note that this chart was generated using voice recognition Dragon dictation software.  Although every effort was made to ensure the accuracy of this automated transcription, some errors in transcription may have occurred.
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
## 2024-11-02 NOTE — ED NOTES
Patient was medically cleared by ED physician. Patient continues to endorse suicidal ideations. Writer consulted with Dr. Sage, psychiatrist. Patient is accepted to the [facility] for safety and stabilization. Patient signed voluntary consent for admission.

## 2024-11-03 PROCEDURE — 6370000000 HC RX 637 (ALT 250 FOR IP): Performed by: INTERNAL MEDICINE

## 2024-11-03 PROCEDURE — 99232 SBSQ HOSP IP/OBS MODERATE 35: CPT | Performed by: PSYCHIATRY & NEUROLOGY

## 2024-11-03 PROCEDURE — 1240000000 HC EMOTIONAL WELLNESS R&B

## 2024-11-03 PROCEDURE — 6370000000 HC RX 637 (ALT 250 FOR IP): Performed by: PSYCHIATRY & NEUROLOGY

## 2024-11-03 PROCEDURE — 99232 SBSQ HOSP IP/OBS MODERATE 35: CPT | Performed by: INTERNAL MEDICINE

## 2024-11-03 PROCEDURE — APPSS30 APP SPLIT SHARED TIME 16-30 MINUTES: Performed by: NURSE PRACTITIONER

## 2024-11-03 RX ORDER — OXCARBAZEPINE 300 MG/1
450 TABLET, FILM COATED ORAL 2 TIMES DAILY
Status: DISCONTINUED | OUTPATIENT
Start: 2024-11-03 | End: 2024-11-05 | Stop reason: HOSPADM

## 2024-11-03 RX ORDER — PRAZOSIN HYDROCHLORIDE 1 MG/1
2 CAPSULE ORAL NIGHTLY
Status: DISCONTINUED | OUTPATIENT
Start: 2024-11-03 | End: 2024-11-05 | Stop reason: HOSPADM

## 2024-11-03 RX ORDER — TRAZODONE HYDROCHLORIDE 100 MG/1
200 TABLET ORAL NIGHTLY
Status: DISCONTINUED | OUTPATIENT
Start: 2024-11-03 | End: 2024-11-05 | Stop reason: HOSPADM

## 2024-11-03 RX ADMIN — HYDROXYZINE HYDROCHLORIDE 50 MG: 50 TABLET, FILM COATED ORAL at 14:01

## 2024-11-03 RX ADMIN — PRAZOSIN HYDROCHLORIDE 2 MG: 1 CAPSULE ORAL at 20:55

## 2024-11-03 RX ADMIN — HYDROXYZINE HYDROCHLORIDE 50 MG: 50 TABLET, FILM COATED ORAL at 20:55

## 2024-11-03 RX ADMIN — OXCARBAZEPINE 450 MG: 300 TABLET, FILM COATED ORAL at 20:55

## 2024-11-03 RX ADMIN — ACETAMINOPHEN 650 MG: 325 TABLET ORAL at 21:02

## 2024-11-03 RX ADMIN — CEPHALEXIN 250 MG: 250 CAPSULE ORAL at 05:54

## 2024-11-03 RX ADMIN — CEPHALEXIN 250 MG: 250 CAPSULE ORAL at 12:07

## 2024-11-03 RX ADMIN — CARIPRAZINE 4.5 MG: 1.5 CAPSULE, GELATIN COATED ORAL at 20:55

## 2024-11-03 RX ADMIN — CEPHALEXIN 250 MG: 250 CAPSULE ORAL at 23:51

## 2024-11-03 RX ADMIN — CEPHALEXIN 250 MG: 250 CAPSULE ORAL at 17:30

## 2024-11-03 RX ADMIN — TRAZODONE HYDROCHLORIDE 200 MG: 100 TABLET ORAL at 20:54

## 2024-11-03 ASSESSMENT — PAIN DESCRIPTION - LOCATION: LOCATION: ANKLE

## 2024-11-03 ASSESSMENT — PAIN SCALES - GENERAL
PAINLEVEL_OUTOF10: 0
PAINLEVEL_OUTOF10: 7

## 2024-11-03 ASSESSMENT — PAIN DESCRIPTION - DESCRIPTORS: DESCRIPTORS: BURNING;OTHER (COMMENT)

## 2024-11-03 ASSESSMENT — PAIN DESCRIPTION - ORIENTATION: ORIENTATION: LEFT

## 2024-11-03 NOTE — GROUP NOTE
Group Therapy Note    Date: 11/3/2024    Group Start Time: 1000  Group End Time: 1045  Group Topic: Psychoeducation    Gabe Marion        Group Therapy Note    Attendees: 7/15     Patient was offered group therapy today but declined to participate despite encouragement from staff. 1:1 was offered.      Signature:  Gabe Carrilol
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
## 2024-11-03 NOTE — PLAN OF CARE
Problem: Self Harm/Suicidality  Goal: Will have no self-injury during hospital stay  Description: INTERVENTIONS:  1.  Ensure constant observer at bedside with Q15M safety checks  2.  Maintain a safe environment  3.  Secure patient belongings  4.  Ensure family/visitors adhere to safety recommendations  5.  Ensure safety tray has been added to patient's diet order  6.  Every shift and PRN: Re-assess suicidal risk via Frequent Screener    11/3/2024 1155 by Gabe Savage, RN  Outcome: Progressing     Problem: Depression  Goal: Will be euthymic at discharge  Description: INTERVENTIONS:  1. Administer medication as ordered  2. Provide emotional support via 1:1 interaction with staff  3. Encourage involvement in milieu/groups/activities  4. Monitor for social isolation  11/3/2024 1155 by Gabe Savage, RN  Outcome: Progressing   Patient currently denies thoughts of self harm or suicidal ideation. Denies any hallucinations or delusions. Patient has slept poorly, trying to rest today . Is eating well patient attends some groups. Mood is coopertaive. Safety checks continue every 15 minutes. Patient has remained safe. Will continue to support and monitor.

## 2024-11-03 NOTE — PLAN OF CARE
Problem: Self Harm/Suicidality  Goal: Will have no self-injury during hospital stay  Description: INTERVENTIONS:  1.  Ensure constant observer at bedside with Q15M safety checks  2.  Maintain a safe environment  3.  Secure patient belongings  4.  Ensure family/visitors adhere to safety recommendations  5.  Ensure safety tray has been added to patient's diet order  6.  Every shift and PRN: Re-assess suicidal risk via Frequent Screener    11/2/2024 2217 by Jolly Dunn RN  Outcome: Not Progressing     Problem: Anxiety  Goal: Will report anxiety at manageable levels  Description: INTERVENTIONS:  1. Administer medication as ordered  2. Teach and rehearse alternative coping skills  3. Provide emotional support with 1:1 interaction with staff  11/2/2024 2217 by Jolly Dunn RN  Outcome: Not Progressing    The patient has been isolative to her room. Irritable at times. She reports that she is angry about her medication and not having Risperdal restarted. She denies endorsing current thoughts of self harm. She reports some anxiousness and frustration. She denies experiencing any depression. Writer encouraged the patient to seek assistance should those thoughts arise. She voiced an understanding. The patient requested PRN anxiety and sleeping medication. PRN Atarax 50 mg and Trazodone 50 mg administered as prescribed as per request. The patient requested that the light get turned off so that she can rest. Writer requested and the lighting was turned off to help reduce agitation and irritability. Writer will continue to offer emotional support. Q15 minute checks to continue for safety.

## 2024-11-04 PROCEDURE — 6370000000 HC RX 637 (ALT 250 FOR IP): Performed by: PSYCHIATRY & NEUROLOGY

## 2024-11-04 PROCEDURE — 99232 SBSQ HOSP IP/OBS MODERATE 35: CPT | Performed by: PSYCHIATRY & NEUROLOGY

## 2024-11-04 PROCEDURE — 6370000000 HC RX 637 (ALT 250 FOR IP): Performed by: INTERNAL MEDICINE

## 2024-11-04 PROCEDURE — 90833 PSYTX W PT W E/M 30 MIN: CPT | Performed by: PSYCHIATRY & NEUROLOGY

## 2024-11-04 PROCEDURE — APPSS30 APP SPLIT SHARED TIME 16-30 MINUTES: Performed by: NURSE PRACTITIONER

## 2024-11-04 PROCEDURE — 1240000000 HC EMOTIONAL WELLNESS R&B

## 2024-11-04 RX ORDER — HYDROXYZINE HYDROCHLORIDE 50 MG/1
50 TABLET, FILM COATED ORAL 3 TIMES DAILY PRN
Qty: 30 TABLET | Refills: 0 | Status: SHIPPED | OUTPATIENT
Start: 2024-11-04 | End: 2024-11-14

## 2024-11-04 RX ORDER — HYDROXYZINE HYDROCHLORIDE 50 MG/1
50 TABLET, FILM COATED ORAL 3 TIMES DAILY PRN
Qty: 30 TABLET | Refills: 0 | Status: SHIPPED | OUTPATIENT
Start: 2024-11-04 | End: 2024-11-04

## 2024-11-04 RX ORDER — TRAZODONE HYDROCHLORIDE 100 MG/1
200 TABLET ORAL NIGHTLY
Qty: 60 TABLET | Refills: 0 | Status: SHIPPED | OUTPATIENT
Start: 2024-11-04

## 2024-11-04 RX ORDER — OXCARBAZEPINE 150 MG/1
450 TABLET, FILM COATED ORAL 2 TIMES DAILY
Qty: 180 TABLET | Refills: 0 | Status: SHIPPED | OUTPATIENT
Start: 2024-11-04

## 2024-11-04 RX ORDER — NICOTINE 21 MG/24HR
1 PATCH, TRANSDERMAL 24 HOURS TRANSDERMAL DAILY
Qty: 30 PATCH | Refills: 0 | Status: SHIPPED | OUTPATIENT
Start: 2024-11-05

## 2024-11-04 RX ORDER — OXCARBAZEPINE 150 MG/1
450 TABLET, FILM COATED ORAL 2 TIMES DAILY
Qty: 180 TABLET | Refills: 0 | Status: SHIPPED | OUTPATIENT
Start: 2024-11-04 | End: 2024-11-04

## 2024-11-04 RX ORDER — TRAZODONE HYDROCHLORIDE 100 MG/1
200 TABLET ORAL NIGHTLY
Qty: 30 TABLET | Refills: 0 | Status: SHIPPED | OUTPATIENT
Start: 2024-11-04 | End: 2024-11-04

## 2024-11-04 RX ORDER — PRAZOSIN HYDROCHLORIDE 2 MG/1
2 CAPSULE ORAL NIGHTLY
Qty: 30 CAPSULE | Refills: 0 | Status: SHIPPED | OUTPATIENT
Start: 2024-11-04 | End: 2024-11-04

## 2024-11-04 RX ORDER — NICOTINE 21 MG/24HR
1 PATCH, TRANSDERMAL 24 HOURS TRANSDERMAL DAILY
Qty: 30 PATCH | Refills: 0 | Status: SHIPPED | OUTPATIENT
Start: 2024-11-05 | End: 2024-11-04

## 2024-11-04 RX ORDER — PRAZOSIN HYDROCHLORIDE 2 MG/1
2 CAPSULE ORAL NIGHTLY
Qty: 30 CAPSULE | Refills: 0 | Status: SHIPPED | OUTPATIENT
Start: 2024-11-04

## 2024-11-04 RX ADMIN — CEPHALEXIN 250 MG: 250 CAPSULE ORAL at 23:58

## 2024-11-04 RX ADMIN — TRAZODONE HYDROCHLORIDE 50 MG: 50 TABLET ORAL at 20:37

## 2024-11-04 RX ADMIN — OXCARBAZEPINE 450 MG: 300 TABLET, FILM COATED ORAL at 20:36

## 2024-11-04 RX ADMIN — HYDROXYZINE HYDROCHLORIDE 50 MG: 50 TABLET, FILM COATED ORAL at 20:37

## 2024-11-04 RX ADMIN — OXCARBAZEPINE 450 MG: 300 TABLET, FILM COATED ORAL at 10:44

## 2024-11-04 RX ADMIN — CEPHALEXIN 250 MG: 250 CAPSULE ORAL at 11:54

## 2024-11-04 RX ADMIN — CEPHALEXIN 250 MG: 250 CAPSULE ORAL at 17:19

## 2024-11-04 RX ADMIN — CARIPRAZINE 4.5 MG: 1.5 CAPSULE, GELATIN COATED ORAL at 10:44

## 2024-11-04 RX ADMIN — CEPHALEXIN 250 MG: 250 CAPSULE ORAL at 05:32

## 2024-11-04 ASSESSMENT — LIFESTYLE VARIABLES
HOW OFTEN DO YOU HAVE A DRINK CONTAINING ALCOHOL: NEVER
HOW MANY STANDARD DRINKS CONTAINING ALCOHOL DO YOU HAVE ON A TYPICAL DAY: PATIENT DOES NOT DRINK

## 2024-11-04 ASSESSMENT — PAIN SCALES - GENERAL
PAINLEVEL_OUTOF10: 0
PAINLEVEL_OUTOF10: 2

## 2024-11-04 NOTE — GROUP NOTE
Group Therapy Note    Date: 11/4/2024    Group Start Time: 1000  Group End Time: 1045  Group Topic: Psychotherapy     Tiffanie Moreau MSW        Group Therapy Note    Attendees: 6/15     Patient was offered group therapy today but declined to participate despite encouragement from staff.  1:1 was offered.    Discipline Responsible: /Counselor      Signature:  MARION Banerjee

## 2024-11-04 NOTE — PROGRESS NOTES
Behavioral Services  Medicare Certification Upon Admission    I certify that this patient's inpatient psychiatric hospital admission is medically necessary for:    [x] (1) Treatment which could reasonably be expected to improve this patient's condition,       [x] (2) Or for diagnostic study;     AND     [x](2) The inpatient psychiatric services are provided while the individual is under the care of a physician and are included in the individualized plan of care.    Estimated length of stay/service 2-9 days    Plan for post-hospital care -outpatient care    Electronically signed by RICHARD ABRAHAM MD on 11/2/2024 at 1:03 PM      
    [facility] Internal Medicine  Ervin Mcdwoell MD; Hans Kay MD, Pascual Weston MD, Bell Mendoza MD, Danyelle Pulido MD; Lizeth Garcia MD    [facility] Internal Medicine   IN-PATIENT SERVICE   [facility]     HISTORY AND PHYSICAL EXAMINATION            Date:   11/3/2024  Patient name:  Nicole Hartman  Date of admission:  11/2/2024  5:55 AM  MRN:   [id number]  Account:  [id number]  YOB: 1985  PCP:    No primary care provider on file.  Room:   [address] [place] [address]  Code Status:    Full Code      Chief Complaint:       Mental health disorder  Constipation    History Obtained From:     Patient/EMR/bedside RN     History of Present Illness:     39-year-old  lady with history of mental health disorder history of street drug abuse including cocaine complains of chronic constipation on Linzess at home denies any bowel obstruction no nausea vomiting no fever chills no significant weight loss or weight gain no history of hyper hypothyroidism    Past Medical History:     Past Medical History:   Diagnosis Date    ADHD     Allergy     dilaudid    Aspiration pneumonia ([facility]) 12/27/2021    Bipolar 1 disorder (HCC)     Bipolar affective disorder, depressed, severe (HCC) 12/30/2021    Bipolar affective disorder, depressed, severe (HCC) 12/30/2021    Bronchitis, chronic (HCC)     Cocaine abuse with intoxication with complication (HCC) 7/5/2016    Depression with suicidal ideation 12/29/2021    Intermittent asthma without complication 8/2/2022    Osteoarthritis     Osteoarthritis     Rh negative state in antepartum period 5/8/2012    Schizophrenia (HCC)     Seizures ([facility]) 7/5/2016    Spinal stenosis         Past Surgical History:     Past Surgical History:   Procedure Laterality Date    CHOLECYSTECTOMY      DILATION AND CURETTAGE      2 x 2011 post partum hemorrhage & SAB    DILATION AND CURETTAGE OF UTERUS      DILATION AND CURETTAGE OF UTERUS      x3 
No [id number]. Rxs cancelled at [facility] all re-sent by prescriber to [facility] [address] [place] [address] [phone]   
RT ASSESSMENT TREATMENT GOALS    [x]Pt Goal:  Pt will identify 1-2 positive coping skills by time of discharge.    []Pt Goal:  Pt will identify 1-2 positive aspects of self by time of discharge.    []Pt Goal:  Pt will remain on task/topic for 15-30 minutes during group by time of discharge.    []Pt Goal:  Pt will identify 1-2 aspects of relapse prevention plan by time of discharge.    [x]Pt Goal:  Pt will join in conversation with peers 1-2 times per group by time of discharge.    []Pt Goal:  Pt will identify 1-2 new leisure interests by time of discharge.    []Pt Goal:  Pt will not voice any delusional content by time of discharge.   
Unable to complete leisure assessment on this date. Will be completed by psychoeducation specialist on 11/4/2024  
practitioners documentation are stated below otherwise agree with assessment.  Plan will be as follows:  Spent 17 minutes with the patient in supportive psychotherapy.  Patient denying side effects to medication.  Reporting improvement in mood.  Denying suicidal or homicidal ideation intent or plan.  Denying psychotic symptoms.  Forward-looking and constructive.  Discussed if continued stability or improvement through tomorrow would consider discharge and patient is in agreement    PLAN  Patient s symptoms   are improving  Continue with current medication for now  Attempt to develop insight  Psycho-education conducted.  Supportive Therapy conducted.  Probable discharge is tomorrow  Follow-up daily while on inpatient unit          
packet 17 g  17 g Oral Daily PRN Richard Abraham MD        aluminum & magnesium hydroxide-simethicone (MAALOX) 200-200-20 MG/5ML suspension 30 mL  30 mL Oral Q6H PRN Richard Abraham MD        cephALEXin (KEFLEX) capsule 250 mg  250 mg Oral 4 times per day Ervin Mcdowell MD   250 mg at 11/03/24 1730    sennosides-docusate sodium (SENOKOT-S) 8.6-50 MG tablet 2 tablet  2 tablet Oral Daily PRN Ervin Mcdowell MD        linaclotide (LINZESS) capsule 145 mcg  145 mcg Oral PRN Ervin Mcdowell MD        nicotine (NICODERM CQ) 14 MG/24HR 1 patch  1 patch TransDERmal Daily Jose Manuel Gomez MD   1 patch at 11/03/24 0848        The patient was seen face-to-face.  She is discharged focused.  She is remorseful of her suicide attempt.  She does not want the medications that were prescribed.  I have reviewed her prior to admission medications documented above and started her on Trileptal 450 Mg twice daily, Vraylar 4.5 mg daily prazosin 2 mg nightly and trazodone 200 mg nightly     PLAN  Medications as noted above  Attempt to develop insight  Psycho-education conducted.  Supportive Therapy conducted.  Follow-up daily while on inpatient unit    Electronically signed by RICHARD ABRAHAM MD on 11/3/24 at 8:10 PM EST

## 2024-11-04 NOTE — GROUP NOTE
Group Therapy Note    Date: 11/4/2024    Group Start Time: 0800  Group End Time: 0830  Group Topic: Orientation Group    Morgan Pineda, RN        Group Therapy Note    Attendees: 9       Patient was present for morning orientation group. Patient was informed of being seen daily by a doctor or nurse practitioner, appropriate times on phone so all residents can utilize them, group times and phone, TV, shower, and request at nurses station limitations, who their scheduled staff for the day is, discharge process, and reminded to fill out a menu for the following day.       Signature:  MORGAN WOLFF RN

## 2024-11-04 NOTE — PLAN OF CARE
Problem: Self Harm/Suicidality  Goal: Will have no self-injury during hospital stay  Description: INTERVENTIONS:  1.  Ensure constant observer at bedside with Q15M safety checks  2.  Maintain a safe environment  3.  Secure patient belongings  4.  Ensure family/visitors adhere to safety recommendations  5.  Ensure safety tray has been added to patient's diet order  6.  Every shift and PRN: Re-assess suicidal risk via Frequent Screener    11/3/2024 2247 by Jolly Dunn, RN  Outcome: Progressing     Problem: Anxiety  Goal: Will report anxiety at manageable levels  Description: INTERVENTIONS:  1. Administer medication as ordered  2. Teach and rehearse alternative coping skills  3. Provide emotional support with 1:1 interaction with staff  11/3/2024 2247 by Jolly Dunn, RN  Outcome: Progressing     The patient has been isolative to her room. Occasionally out for needs. She denies endorsing any current thoughts of self harm. Writer encouraged the patient to seek assistance should those thoughts arise. She voiced an understanding. She reports an increase in anxiousness and depression whenever she is conversing on the phone with her boyfriend. At times she is heard yelling and cursing. Some verbal redirection of appropriateness given. She was able to be redirected. PRN Anxiety medication given per request. Lights turned off in room per request to help promote rest and decrease in stimuli. She is complaint with medications. Writer will continue to offer emotional support. Q15 minute checks to continue for safety.
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
## 2024-11-04 NOTE — GROUP NOTE
Group Therapy Note    Date: 11/4/2024    Group Start Time: 1100  Group End Time: 1140  Group Topic: Cognitive Skills    Jodi Keating CTRS        Group Therapy Note    Attendees: 4/15    Cognitive Skills Group Note        Date: November 4, 2024 Start Time: 11am  End Time: 11:40am      Number of Participants in Group & Unit Census:  4/15    Topic:  interpersonal skills, memory recall, concentration     Goal of Group: To improve interpersonal skills and memory recall through collaborating with peers and concentrating on a presented task.       Comments:     Patient did not participate in Cognitive Skills group, despite staff encouragement and explanation of benefits.  Patient remain seclusive to self.  Q15 minute safety checks maintained for patient safety and will continue to encourage patient to attend unit programming.        Signature:  LORENZO Hearn

## 2024-11-04 NOTE — GROUP NOTE
Group Therapy Note    Date: 11/4/2024    Group Start Time: 1430  Group End Time: 1515  Group Topic: Psychoeducation    Jodi Keating CTRS        Group Therapy Note    Attendees: 5/13    Psych-Ed/Relapse Prevention Group Note        Date: November 4, 2024 Start Time: 2:30pm  End Time: 3:15pm      Number of Participants in Group & Unit Census:  5/13    Topic:  interpersonal skills, coping skills, self-expression     Goal of Group: To improve interpersonal skills and coping skills awareness through collaborating with peers and demonstrating self-expression.      Comments:     Patient did not participate in Psych-Ed/Relapse Prevention group, despite staff encouragement and explanation of benefits.  Patient remain seclusive to self.  Q15 minute safety checks maintained for patient safety and will continue to encourage patient to attend unit programming.        Signature:  LORENZO Hearn

## 2024-11-05 VITALS
RESPIRATION RATE: 16 BRPM | SYSTOLIC BLOOD PRESSURE: 124 MMHG | TEMPERATURE: 98.3 F | HEART RATE: 69 BPM | WEIGHT: 145 LBS | BODY MASS INDEX: 23.3 KG/M2 | OXYGEN SATURATION: 100 % | DIASTOLIC BLOOD PRESSURE: 83 MMHG | HEIGHT: 66 IN

## 2024-11-05 PROCEDURE — 6370000000 HC RX 637 (ALT 250 FOR IP): Performed by: PSYCHIATRY & NEUROLOGY

## 2024-11-05 PROCEDURE — 99239 HOSP IP/OBS DSCHRG MGMT >30: CPT | Performed by: PSYCHIATRY & NEUROLOGY

## 2024-11-05 PROCEDURE — 6370000000 HC RX 637 (ALT 250 FOR IP): Performed by: INTERNAL MEDICINE

## 2024-11-05 RX ADMIN — CARIPRAZINE 4.5 MG: 1.5 CAPSULE, GELATIN COATED ORAL at 08:25

## 2024-11-05 RX ADMIN — CEPHALEXIN 250 MG: 250 CAPSULE ORAL at 06:11

## 2024-11-05 RX ADMIN — OXCARBAZEPINE 450 MG: 300 TABLET, FILM COATED ORAL at 08:26

## 2024-11-05 NOTE — BH NOTE
Patient refused to fill out discharge OQ questions and stated \"I didn't want to fill it out when I got here and I don't want to fill it out now.\"

## 2024-11-05 NOTE — TRANSITION OF CARE
Behavioral Health Transition Record    Patient Name: Nicole Hartman  YOB: 1985   Medical Record Number: [id number]  Date of Admission: 11/2/2024  5:55 AM   Date of Discharge: 11/5/24    Attending Provider: Jose Manuel Gomez MD   Discharging Provider: Dr. Gomez  To contact this individual call [phone] and ask the  to page.  If unavailable, ask to be transferred to Behavioral Health Provider on call.  A Behavioral Health Provider will be available on call 24/7 and during holidays.    Primary Care Provider: No primary care provider on file.    Allergies   Allergen Reactions    Hydromorphone Hcl        Reason for Admission: Threat to self requiring 24 hour professional observation.  Concerns about patient's safety in the community.    Admission Diagnosis: Suicidal ideation [R45.851]  Depression with suicidal ideation [F32.A, R45.851]    * No surgery found *    Results for orders placed or performed during the hospital encounter of 11/02/24   CBC with Auto Differential   Result Value Ref Range    WBC 15.1 (H) 3.5 - 11.0 k/uL    RBC 4.69 4.0 - 5.2 m/uL    Hemoglobin 15.1 12.0 - 16.0 g/dL    Hematocrit 42.2 36 - 46 %    MCV 90.0 80 - 100 fL    MCH 32.2 26 - 34 pg    MCHC 35.8 31 - 37 g/dL    RDW 13.4 11.5 - 14.9 %    Platelets 372 150 - 450 k/uL    MPV 7.7 6.0 - 12.0 fL    Neutrophils % 73 (H) 36 - 66 %    Lymphocytes % 15 (L) 24 - 44 %    Monocytes % 11 (H) 1 - 7 %    Eosinophils % 1 0 - 4 %    Basophils % 0 0 - 2 %    Neutrophils Absolute 11.10 (H) 1.3 - 9.1 k/uL    Lymphocytes Absolute 2.20 1.0 - 4.8 k/uL    Monocytes Absolute 1.60 (H) 0.1 - 1.3 k/uL    Eosinophils Absolute 0.20 0.0 - 0.4 k/uL    Basophils Absolute 0.00 0.0 - 0.2 k/uL   CMP   Result Value Ref Range    Sodium 135 (L) 136 - 145 mmol/L    Potassium 3.7 3.7 - 5.3 mmol/L    Chloride 99 98 - 107 mmol/L    CO2 24 20 - 31 mmol/L    Anion Gap 12 9 - 16 mmol/L    Glucose 106 (H) 74 - 99 mg/dL    BUN 10 6 - 20 mg/dL    Creatinine 0.9

## 2024-11-05 NOTE — DISCHARGE SUMMARY
Details   cariprazine hcl (VRAYLAR) 4.5 MG CAPS capsule Take 1 capsule by mouth daily, Disp-30 capsule, R-0Normal      cephALEXin (KEFLEX) 250 MG capsule Take 1 capsule by mouth 4 times daily for 12 doses, Disp-8 capsule, R-0Normal      hydrOXYzine HCl (ATARAX) 50 MG tablet Take 1 tablet by mouth 3 times daily as needed for Anxiety, Disp-30 tablet, R-0Normal      nicotine (NICODERM CQ) 14 MG/24HR Place 1 patch onto the skin daily, Disp-30 patch, R-0Normal      OXcarbazepine (TRILEPTAL) 150 MG tablet Take 3 tablets by mouth 2 times daily, Disp-180 tablet, R-0Normal      prazosin (MINIPRESS) 2 MG capsule Take 1 capsule by mouth nightly, Disp-30 capsule, R-0Normal      traZODone (DESYREL) 100 MG tablet Take 2 tablets by mouth nightly, Disp-60 tablet, R-0Normal           CONTINUE these medications which have NOT CHANGED    Details   polyethylene glycol (MIRALAX) 17 GM/SCOOP powder Take 17 g by mouth daily, Disp-116 g, R-0Print           STOP taking these medications       lidocaine (XYLOCAINE) 5 % ointment Comments:   Reason for Stopping:         ibuprofen (IBU) 800 MG tablet Comments:   Reason for Stopping:         FLUoxetine (PROZAC) 10 MG capsule Comments:   Reason for Stopping:         lidocaine 4 % external patch Comments:   Reason for Stopping:         OLANZapine (ZYPREXA) 10 MG tablet Comments:   Reason for Stopping:         albuterol sulfate HFA (PROVENTIL HFA) 108 (90 Base) MCG/ACT inhaler Comments:   Reason for Stopping:                Core Measures statement:   Not applicable    Discharge Exam:  Level of consciousness:  Within normal limits  Appearance: Street clothes, seated, with good grooming  Behavior/Motor: No abnormalities noted  Attitude toward examiner:  Cooperative, attentive, good eye contact  Speech:  spontaneous, normal rate, normal volume and well articulated  Mood:  euthymic  Affect:  Full range  Thought processes:  linear, goal directed and coherent  Thought content:  denies homicidal

## 2024-11-05 NOTE — DISCHARGE INSTRUCTIONS
[phone]     Suicide Hotline ([facility] Crisis Care Line)  [phone]      Recovery Help line- [phone]      To obtain results of pending studies call Medical Records at: [phone]     For emergencies and 24 hour/7 days a week contact information:  [phone]

## 2024-11-05 NOTE — BH NOTE
[facility]  Discharge Note    Pt discharged with followings belongings:   Dental Appliances: None  Vision - Corrective Lenses: None  Hearing Aid: None  Jewelry: Earrings  Body Piercings Removed: Yes  Clothing: Socks, Pants, Shirt, Undergarments  Other Valuables: Wallet, Credit/Debit Card, Lighter/Matches   Valuables sent home with patient. Patient educated on aftercare instructions: Yes  Information faxed to [facility] by RN  at 10:10 AM .Patient verbalize understanding of AVS:  Yes.    Status EXAM upon discharge:  Mental Status and Behavioral Exam  Normal: No  Level of Assistance: Independent/Self  Facial Expression: Exaggerated  Affect: Appropriate  Level of Consciousness: Alert  Frequency of Checks: 4 times per hour, close  Mood:Normal: No  Mood: Depressed, Anxious  Motor Activity:Normal: No  Motor Activity: Unusual posture/gait  Eye Contact: Good  Observed Behavior: Cooperative, Preoccupied  Sexual Misconduct History: Current - no  Involved In Any Sexual Misconduct With Others? : No  History of Sexually Inappropriate Behavior When Previously Hospitalized?: Unable to access  Uncontrollable/Compulsive Masturbation?: No  Difficulty Controlling Sexual Impulses?: No  Preception: Serena to person, Serena to time, Serena to place, Serena to situation  Attention:Normal: No (no)  Attention: Distractible  Thought Processes: Circumstantial, Flight of ideas  Thought Content:Normal: No  Thought Content: Preoccupations  Depression Symptoms: Feelings of hopelessess, Feelings of helplessness, Sleep disturbance  Anxiety Symptoms: Generalized  Darcy Symptoms: No problems reported or observed.  Hallucinations: None  Delusions: No  Memory:Normal: Yes  Insight and Judgment: No  Insight and Judgment: Poor judgment, Poor insight    Tobacco Screening:  Practical Counseling, on admission, larry X, if applicable and completed (first 3 are required if patient doesn't refuse):            ( ) Recognizing danger

## 2024-11-05 NOTE — PLAN OF CARE
Problem: Self Harm/Suicidality  Goal: Will have no self-injury during hospital stay  Description: INTERVENTIONS:  1.  Ensure constant observer at bedside with Q15M safety checks  2.  Maintain a safe environment  3.  Secure patient belongings  4.  Ensure family/visitors adhere to safety recommendations  5.  Ensure safety tray has been added to patient's diet order  6.  Every shift and PRN: Re-assess suicidal risk via Frequent Screener    11/4/2024 2101 by Siddharth Petersen RN  Outcome: Progressing     Problem: Depression  Goal: Will be euthymic at discharge  Description: INTERVENTIONS:  1. Administer medication as ordered  2. Provide emotional support via 1:1 interaction with staff  3. Encourage involvement in milieu/groups/activities  4. Monitor for social isolation  11/4/2024 2101 by Siddharth Petersen RN  Outcome: Progressing     Problem: Anxiety  Goal: Will report anxiety at manageable levels  Description: INTERVENTIONS:  1. Administer medication as ordered  2. Teach and rehearse alternative coping skills  3. Provide emotional support with 1:1 interaction with staff  11/4/2024 2101 by Siddharth Petersen RN  Outcome: Progressing     Problem: Pain  Goal: Verbalizes/displays adequate comfort level or baseline comfort level  Outcome: Progressing    Patient had complaints of tiredness related to her trazodone 200 mg. She requested to have 50 mg PRN trazodone for night medication. Patient seemed anxious and concerned about her medications and side effects. Writer educated patient on signs and symptoms of nightly medication. Writer also educated patient on signs, symptoms, and preventive measures to prevent UTI.

## 2024-11-05 NOTE — BH NOTE
Patient given tobacco quitline number 03163714991 at this time, refusing to call at this time, states \" I just dont want to quit now\"- patient given information as to the dangers of long term tobacco use. Continue to reinforce the importance of tobacco cessation.

## 2025-03-29 ENCOUNTER — HOSPITAL ENCOUNTER (EMERGENCY)
Age: 40
Discharge: HOME | End: 2025-03-29
Attending: EMERGENCY MEDICINE
Payer: MEDICAID

## 2025-03-29 VITALS
RESPIRATION RATE: 14 BRPM | BODY MASS INDEX: 23.88 KG/M2 | HEIGHT: 66 IN | DIASTOLIC BLOOD PRESSURE: 76 MMHG | HEART RATE: 66 BPM | WEIGHT: 148.59 LBS | SYSTOLIC BLOOD PRESSURE: 123 MMHG | OXYGEN SATURATION: 97 % | TEMPERATURE: 97.5 F

## 2025-03-29 DIAGNOSIS — S01.112A EYEBROW LACERATION, LEFT, INITIAL ENCOUNTER: Primary | ICD-10-CM

## 2025-03-29 PROCEDURE — 99283 EMERGENCY DEPT VISIT LOW MDM: CPT

## 2025-03-29 PROCEDURE — 6370000000 HC RX 637 (ALT 250 FOR IP): Performed by: EMERGENCY MEDICINE

## 2025-03-29 PROCEDURE — 12011 RPR F/E/E/N/L/M 2.5 CM/<: CPT

## 2025-03-29 RX ORDER — IBUPROFEN 800 MG/1
800 TABLET, FILM COATED ORAL ONCE
Status: COMPLETED | OUTPATIENT
Start: 2025-03-29 | End: 2025-03-29

## 2025-03-29 RX ORDER — ONDANSETRON 4 MG/1
4 TABLET, ORALLY DISINTEGRATING ORAL ONCE
Status: COMPLETED | OUTPATIENT
Start: 2025-03-29 | End: 2025-03-29

## 2025-03-29 RX ORDER — IBUPROFEN 800 MG/1
800 TABLET, FILM COATED ORAL 2 TIMES DAILY PRN
Qty: 60 TABLET | Refills: 0 | Status: SHIPPED | OUTPATIENT
Start: 2025-03-29

## 2025-03-29 RX ADMIN — IBUPROFEN 800 MG: 800 TABLET, FILM COATED ORAL at 11:15

## 2025-03-29 RX ADMIN — ONDANSETRON 4 MG: 4 TABLET, ORALLY DISINTEGRATING ORAL at 11:15

## 2025-03-29 ASSESSMENT — PAIN DESCRIPTION - DESCRIPTORS: DESCRIPTORS: ACHING;NUMBNESS

## 2025-03-29 ASSESSMENT — ENCOUNTER SYMPTOMS: SHORTNESS OF BREATH: 0

## 2025-03-29 ASSESSMENT — PAIN - FUNCTIONAL ASSESSMENT
PAIN_FUNCTIONAL_ASSESSMENT: 0-10
PAIN_FUNCTIONAL_ASSESSMENT: ACTIVITIES ARE NOT PREVENTED
PAIN_FUNCTIONAL_ASSESSMENT: ACTIVITIES ARE NOT PREVENTED

## 2025-03-29 ASSESSMENT — PAIN DESCRIPTION - PAIN TYPE: TYPE: ACUTE PAIN

## 2025-03-29 ASSESSMENT — PAIN SCALES - GENERAL
PAINLEVEL_OUTOF10: 6
PAINLEVEL_OUTOF10: 5

## 2025-03-29 ASSESSMENT — PAIN DESCRIPTION - FREQUENCY: FREQUENCY: INTERMITTENT

## 2025-03-29 ASSESSMENT — PAIN DESCRIPTION - ORIENTATION
ORIENTATION: LEFT
ORIENTATION: LEFT

## 2025-03-29 ASSESSMENT — LIFESTYLE VARIABLES
HOW MANY STANDARD DRINKS CONTAINING ALCOHOL DO YOU HAVE ON A TYPICAL DAY: 1 OR 2
HOW OFTEN DO YOU HAVE A DRINK CONTAINING ALCOHOL: 2-4 TIMES A MONTH

## 2025-03-29 ASSESSMENT — PAIN DESCRIPTION - LOCATION: LOCATION: EYE

## 2025-03-29 NOTE — ED PROVIDER NOTES
[facility] EMERGENCY DEPARTMENT  Emergency Department Encounter  Emergency Medicine Resident     Pt Name:Nicole Hartman  MRN: [id number]  Birthdate 1985  Date of evaluation: 3/29/25  PCP:  No primary care provider on file.  Note Started: 10:59 AM EDT      CHIEF COMPLAINT       Chief Complaint   Patient presents with    Facial Laceration       HISTORY OF PRESENT ILLNESS  (Location/Symptom, Timing/Onset, Context/Setting, Quality, Duration, Modifying Factors, Severity.)      Nicole Hartman is a 39 y.o. female who presents with laceration.  Patient states that she was opening a door and cut monitor face just below her left eyebrow.  Denies any other injuries passing out blood thinner use.  Patient states that her tetanus is updated.    Tetanus updated on 1/25/23.     PAST MEDICAL / SURGICAL / SOCIAL / FAMILY HISTORY      has a past medical history of ADHD, Allergy, Aspiration pneumonia (HCC), Bipolar 1 disorder (HCC), Bipolar affective disorder, depressed, severe (HCC), Bipolar affective disorder, depressed, severe (HCC), Bronchitis, chronic (HCC), Cocaine abuse with intoxication with complication (HCC), Depression with suicidal ideation, Intermittent asthma without complication, Osteoarthritis, Osteoarthritis, Rh negative state in antepartum period, Schizophrenia (HCC), Seizures (HCC), and Spinal stenosis.       has a past surgical history that includes Cholecystectomy; hernia repair; Upper gastrointestinal endoscopy; Mandible fracture surgery; Dilation and curettage of uterus; hernia repair (2011); Dilation & curettage; Mandible surgery; hernia repair; and Dilation and curettage of uterus.      Social History     Socioeconomic History    Marital status:      Spouse name: Not on file    Number of children: Not on file    Years of education: Not on file    Highest education level: Not on file   Occupational History    Not on file   Tobacco Use    Smoking status: Every Day     Current

## 2025-03-29 NOTE — DISCHARGE INSTRUCTIONS
Go to your primary care physician or return to the emergency department in 4-5 days to have your sutures removed and possible Dermabond application. Please come back no later than 7 days.     For pain use acetaminophen (Tylenol) or ibuprofen (Motrin / Advil), unless prescribed medications that have acetaminophen or ibuprofen (or similar medications) in it.  You can take over the counter acetaminophen tablets (1 - 2 tablets of the 500-mg strength every 6 hours) or ibuprofen tablets (2 tablets every 4 hours).    You can shower with the laceration, would avoid baths or swimming in lakes / rivers.  Apply bacitracin / triple antibiotic ointment / Neosporin / Vaseline to the wound twice a day.    When you go outside, place sunscreen on the healing wound after the sutures have been removed for the next year to help with scarring.    PLEASE RETURN TO THE EMERGENCY DEPARTMENT IMMEDIATELY for worsening symptoms, redness around the wound or redness streaking up the body part, white drainage from the wound, or if you develop any concerning symptoms such as: high fever not relieved by acetaminophen (Tylenol) and/or ibuprofen (Motrin / Advil), chills, shortness of breath, chest pain, feeling of your heart fluttering or racing, persistent nausea and/or vomiting, vomiting up blood, blood in your stool, numbness, loss of consciousness, weakness or tingling in the arms or legs or change in color of the extremities, changes in mental status, persistent headache, blurry vision, loss of bladder / bowel control, unable to follow up with your physician, or other any other care or concern.

## 2025-03-29 NOTE — ED PROVIDER NOTES
[facility] EMERGENCY DEPARTMENT     Emergency Department     Faculty Attestation    I performed a history and physical examination of the patient and discussed management with the resident. I reviewed the resident’s note and agree with the documented findings and plan of care. Any areas of disagreement are noted on the chart. I was personally present for the key portions of any procedures. I have documented in the chart those procedures where I was not present during the key portions. I have reviewed the emergency nurses triage note. I agree with the chief complaint, past medical history, past surgical history, allergies, medications, social and family history as documented unless otherwise noted below. For Physician Assistant/ Nurse Practitioner cases/documentation I have personally evaluated this patient and have completed at least one if not all key elements of the E/M (history, physical exam, and MDM). Additional findings are as noted.    11:13 AM EDT    Patient presents with a laceration just below her left eyebrow.  She says she was opening a door this morning and accidentally hit her face with it.  She denies any loss of consciousness.  She says she is feeling a little nauseous since this happened.  She has not on any anticoagulants.  She denies any pain to the eye itself or vision changes.  On exam, patient is sitting up in the bed and appears well.  There is a 1 cm vertical laceration just inferior to the left eyebrow.  The eyelid is not involved.  Patient says her tetanus is up-to-date.  Will irrigate the wound and repair with sutures.      Violeta Dave MD  Attending Emergency  Physician

## 2025-03-29 NOTE — ED TRIAGE NOTES
Patient here for left eye laceration  Patient reports about 30 minutes prior to arrival to the ER , she was letting her dogs out and her face caught the door  0.5 cm, eye lid laceration , no bleeding , swelling noted   She is up to date on her  tetanus vaccine

## 2025-07-15 ENCOUNTER — APPOINTMENT (OUTPATIENT)
Dept: CT IMAGING | Age: 40
End: 2025-07-15
Payer: MEDICARE

## 2025-07-15 ENCOUNTER — HOSPITAL ENCOUNTER (EMERGENCY)
Age: 40
Discharge: HOME | End: 2025-07-15
Attending: EMERGENCY MEDICINE
Payer: MEDICARE

## 2025-07-15 VITALS
RESPIRATION RATE: 18 BRPM | HEART RATE: 85 BPM | OXYGEN SATURATION: 97 % | WEIGHT: 140 LBS | TEMPERATURE: 98.2 F | DIASTOLIC BLOOD PRESSURE: 91 MMHG | HEIGHT: 66 IN | BODY MASS INDEX: 22.5 KG/M2 | SYSTOLIC BLOOD PRESSURE: 119 MMHG

## 2025-07-15 DIAGNOSIS — N12 PYELITIS: Primary | ICD-10-CM

## 2025-07-15 LAB
ALBUMIN SERPL-MCNC: 4.4 G/DL (ref 3.5–5.2)
ALBUMIN/GLOB SERPL: 1.6 {RATIO} (ref 1–2.5)
ALP SERPL-CCNC: 62 U/L (ref 35–104)
ALT SERPL-CCNC: 8 U/L (ref 10–35)
ANION GAP SERPL CALCULATED.3IONS-SCNC: 13 MMOL/L (ref 9–16)
AST SERPL-CCNC: 20 U/L (ref 10–35)
BACTERIA URNS QL MICRO: ABNORMAL
BASOPHILS # BLD: 0.07 K/UL (ref 0–0.2)
BASOPHILS NFR BLD: 1 % (ref 0–2)
BILIRUB SERPL-MCNC: 0.7 MG/DL (ref 0–1.2)
BILIRUB UR QL STRIP: NEGATIVE
BUN SERPL-MCNC: 11 MG/DL (ref 6–20)
CALCIUM SERPL-MCNC: 9.5 MG/DL (ref 8.6–10.4)
CASTS #/AREA URNS LPF: ABNORMAL /LPF (ref 0–8)
CHLORIDE SERPL-SCNC: 104 MMOL/L (ref 98–107)
CLARITY UR: ABNORMAL
CO2 SERPL-SCNC: 21 MMOL/L (ref 20–31)
COLOR UR: YELLOW
CREAT SERPL-MCNC: 0.8 MG/DL (ref 0.6–0.9)
EOSINOPHIL # BLD: 0.16 K/UL (ref 0–0.44)
EOSINOPHILS RELATIVE PERCENT: 1 % (ref 1–4)
EPI CELLS #/AREA URNS HPF: ABNORMAL /HPF (ref 0–5)
ERYTHROCYTE [DISTWIDTH] IN BLOOD BY AUTOMATED COUNT: 12.8 % (ref 11.8–14.4)
GFR, ESTIMATED: >90 ML/MIN/1.73M2
GLUCOSE SERPL-MCNC: 95 MG/DL (ref 74–99)
GLUCOSE UR STRIP-MCNC: NEGATIVE MG/DL
HCG SERPL QL: NEGATIVE
HCT VFR BLD AUTO: 44.9 % (ref 36.3–47.1)
HGB BLD-MCNC: 15.5 G/DL (ref 11.9–15.1)
HGB UR QL STRIP.AUTO: ABNORMAL
IMM GRANULOCYTES # BLD AUTO: 0.07 K/UL (ref 0–0.3)
IMM GRANULOCYTES NFR BLD: 1 %
KETONES UR STRIP-MCNC: NEGATIVE MG/DL
LEUKOCYTE ESTERASE UR QL STRIP: ABNORMAL
LIPASE SERPL-CCNC: 24 U/L (ref 13–60)
LYMPHOCYTES NFR BLD: 2.14 K/UL (ref 1.1–3.7)
LYMPHOCYTES RELATIVE PERCENT: 15 % (ref 24–43)
MCH RBC QN AUTO: 30.6 PG (ref 25.2–33.5)
MCHC RBC AUTO-ENTMCNC: 34.5 G/DL (ref 28.4–34.8)
MCV RBC AUTO: 88.7 FL (ref 82.6–102.9)
MONOCYTES NFR BLD: 1.11 K/UL (ref 0.1–1.2)
MONOCYTES NFR BLD: 8 % (ref 3–12)
NEUTROPHILS NFR BLD: 74 % (ref 36–65)
NEUTS SEG NFR BLD: 11.22 K/UL (ref 1.5–8.1)
NITRITE UR QL STRIP: POSITIVE
NRBC BLD-RTO: 0 PER 100 WBC
PH UR STRIP: 5.5 [PH] (ref 5–8)
PLATELET # BLD AUTO: 355 K/UL (ref 138–453)
PMV BLD AUTO: 9.1 FL (ref 8.1–13.5)
POTASSIUM SERPL-SCNC: 3.9 MMOL/L (ref 3.7–5.3)
PROT SERPL-MCNC: 7.2 G/DL (ref 6.6–8.7)
PROT UR STRIP-MCNC: ABNORMAL MG/DL
RBC # BLD AUTO: 5.06 M/UL (ref 3.95–5.11)
RBC #/AREA URNS HPF: ABNORMAL /HPF (ref 0–4)
SODIUM SERPL-SCNC: 138 MMOL/L (ref 136–145)
SP GR UR STRIP: 1 (ref 1–1.03)
UROBILINOGEN UR STRIP-ACNC: NORMAL EU/DL (ref 0–1)
WBC #/AREA URNS HPF: ABNORMAL /HPF (ref 0–5)
WBC OTHER # BLD: 14.8 K/UL (ref 3.5–11.3)

## 2025-07-15 PROCEDURE — 74177 CT ABD & PELVIS W/CONTRAST: CPT

## 2025-07-15 PROCEDURE — 99285 EMERGENCY DEPT VISIT HI MDM: CPT

## 2025-07-15 PROCEDURE — 85025 COMPLETE CBC W/AUTO DIFF WBC: CPT

## 2025-07-15 PROCEDURE — 2580000003 HC RX 258

## 2025-07-15 PROCEDURE — 6360000004 HC RX CONTRAST MEDICATION

## 2025-07-15 PROCEDURE — 80053 COMPREHEN METABOLIC PANEL: CPT

## 2025-07-15 PROCEDURE — 6370000000 HC RX 637 (ALT 250 FOR IP)

## 2025-07-15 PROCEDURE — 83690 ASSAY OF LIPASE: CPT

## 2025-07-15 PROCEDURE — 84703 CHORIONIC GONADOTROPIN ASSAY: CPT

## 2025-07-15 PROCEDURE — 87086 URINE CULTURE/COLONY COUNT: CPT

## 2025-07-15 PROCEDURE — 87077 CULTURE AEROBIC IDENTIFY: CPT

## 2025-07-15 PROCEDURE — 6360000002 HC RX W HCPCS

## 2025-07-15 PROCEDURE — 96374 THER/PROPH/DIAG INJ IV PUSH: CPT

## 2025-07-15 PROCEDURE — 87186 SC STD MICRODIL/AGAR DIL: CPT

## 2025-07-15 PROCEDURE — 81001 URINALYSIS AUTO W/SCOPE: CPT

## 2025-07-15 RX ORDER — 0.9 % SODIUM CHLORIDE 0.9 %
1000 INTRAVENOUS SOLUTION INTRAVENOUS ONCE
Status: COMPLETED | OUTPATIENT
Start: 2025-07-15 | End: 2025-07-15

## 2025-07-15 RX ORDER — ONDANSETRON 2 MG/ML
4 INJECTION INTRAMUSCULAR; INTRAVENOUS ONCE
Status: COMPLETED | OUTPATIENT
Start: 2025-07-15 | End: 2025-07-15

## 2025-07-15 RX ORDER — IOPAMIDOL 755 MG/ML
75 INJECTION, SOLUTION INTRAVASCULAR
Status: COMPLETED | OUTPATIENT
Start: 2025-07-15 | End: 2025-07-15

## 2025-07-15 RX ORDER — DICYCLOMINE HYDROCHLORIDE 10 MG/ML
20 INJECTION INTRAMUSCULAR ONCE
Status: DISCONTINUED | OUTPATIENT
Start: 2025-07-15 | End: 2025-07-15

## 2025-07-15 RX ORDER — CEPHALEXIN 500 MG/1
500 CAPSULE ORAL 4 TIMES DAILY
Qty: 40 CAPSULE | Refills: 0 | Status: SHIPPED | OUTPATIENT
Start: 2025-07-15 | End: 2025-07-25

## 2025-07-15 RX ORDER — FAMOTIDINE 20 MG/1
20 TABLET, FILM COATED ORAL ONCE
Status: COMPLETED | OUTPATIENT
Start: 2025-07-15 | End: 2025-07-15

## 2025-07-15 RX ORDER — CEPHALEXIN 500 MG/1
500 CAPSULE ORAL ONCE
Status: COMPLETED | OUTPATIENT
Start: 2025-07-15 | End: 2025-07-15

## 2025-07-15 RX ADMIN — SODIUM CHLORIDE 1000 ML: 0.9 INJECTION, SOLUTION INTRAVENOUS at 16:47

## 2025-07-15 RX ADMIN — ONDANSETRON 4 MG: 2 INJECTION, SOLUTION INTRAMUSCULAR; INTRAVENOUS at 16:48

## 2025-07-15 RX ADMIN — CEPHALEXIN 500 MG: 500 CAPSULE ORAL at 19:19

## 2025-07-15 RX ADMIN — FAMOTIDINE 20 MG: 20 TABLET, FILM COATED ORAL at 16:48

## 2025-07-15 RX ADMIN — IOPAMIDOL 75 ML: 755 INJECTION, SOLUTION INTRAVENOUS at 17:11

## 2025-07-15 ASSESSMENT — PAIN DESCRIPTION - PAIN TYPE: TYPE: ACUTE PAIN

## 2025-07-15 ASSESSMENT — PAIN DESCRIPTION - ORIENTATION: ORIENTATION: RIGHT;LOWER

## 2025-07-15 ASSESSMENT — PAIN DESCRIPTION - DESCRIPTORS: DESCRIPTORS: CRAMPING;ACHING

## 2025-07-15 ASSESSMENT — PAIN SCALES - GENERAL: PAINLEVEL_OUTOF10: 8

## 2025-07-15 ASSESSMENT — PAIN - FUNCTIONAL ASSESSMENT: PAIN_FUNCTIONAL_ASSESSMENT: ACTIVITIES ARE NOT PREVENTED

## 2025-07-15 ASSESSMENT — PAIN DESCRIPTION - LOCATION: LOCATION: ABDOMEN;PELVIS

## 2025-07-15 NOTE — ED NOTES
Pt arrived to ED alert and oriented x4 via triage.  Pt c/o abdominal pain and pelvic pressure.  Pt reports hx chronic constipation, states she was not able to have a bowel movement for 2 weeks.  Pt reports that when she was not able to have a bowel movement she was not able to urinate, states that she would have urinary urgency but was not urinating \"a lot\".  Pt reports that since she has had a bowel movement she has been urinating per usual, states she is \"pooping stomach acid\".  Pt reports RLQ pain and pelvic pressure, states pressure when she has a bowel movement.  Pt denies having been around anyone suspected to have COVID-19 or anyone that has been sick, denies recent travel outside the state of [place] or [place].  RR even and unlabored.   NAD noted.   Whiteboard updated.  Will continue with plan of care.

## 2025-07-15 NOTE — DISCHARGE INSTRUCTIONS
You were seen in the ED for abdominal pain.    CT scan and urinalysis show infection of the urinary tract.    Please take Keflex 500 Mg 4 times a day for the next 10 days.    Please follow-up with [facility] in the outpatient setting within the next 3 days.  Information for scheduling an outpatient appointment has been provided    Please return to the ED if you have any worsening abdominal pain, shortness of breath, fever/chills or no improvement in symptoms

## 2025-07-15 NOTE — ED PROVIDER NOTES
[facility]     Emergency Department     Faculty Attestation    I performed a history and physical examination of the patient and discussed management with the resident. I reviewed the resident’s note and agree with the documented findings and plan of care. Any areas of disagreement are noted on the chart. I was personally present for the key portions of any procedures. I have documented in the chart those procedures where I was not present during the key portions. I have reviewed the emergency nurses triage note. I agree with the chief complaint, past medical history, past surgical history, allergies, medications, social and family history as documented unless otherwise noted below.        For Physician Assistant/ Nurse Practitioner cases/documentation I have personally evaluated this patient and have completed at least one if not all key elements of the E/M (history, physical exam, and MDM). Additional findings are as noted.  I have personally seen and evaluated the patient.  I find the patient's history and physical exam are consistent with the NP/PA documentation.  I agree with the care provided, treatment rendered, disposition and follow-up plan.    Patient reporting abdominal pain right lower quadrant at the present time.  1 day of pain nonradiating at the present time tender specific in the right lower quadrant without peritoneal signs or Rovsing's      Critical Care     Dexter Cole M.D.  Attending Emergency  Physician            Dexter Cole MD  07/15/25 [id number]

## 2025-07-15 NOTE — ED PROVIDER NOTES
[facility]  FACULTY HANDOFF     5:54 PM EDT  Handoff taken on the following patient from prior Attending Physician:  Pt Name: Nicole Hartman  PCP:  No primary care provider on file.      Attestation  I was available and discussed any additional care issues that arose and coordinated the management plans with the resident(s) caring for the patient during my duty period. Any areas of disagreement with resident's documentation of care or procedures are noted on the chart. I was personally present for the key portions of any/all procedures during my duty period. I have documented in the chart those procedures where I was not present during the key portions.         CHIEF COMPLAINT       Chief Complaint   Patient presents with    Abdominal Pain     Was constipated, not anymore, having pain         CURRENT MEDICATIONS     Previous Medications  Previous Medications    CARIPRAZINE HCL (VRAYLAR) 4.5 MG CAPS CAPSULE    Take 1 capsule by mouth daily    IBUPROFEN (ADVIL;MOTRIN) 800 MG TABLET    Take 1 tablet by mouth 2 times daily as needed for Pain    NICOTINE (NICODERM CQ) 14 MG/24HR    Place 1 patch onto the skin daily    OXCARBAZEPINE (TRILEPTAL) 150 MG TABLET    Take 3 tablets by mouth 2 times daily    POLYETHYLENE GLYCOL (MIRALAX) 17 GM/SCOOP POWDER    Take 17 g by mouth daily    PRAZOSIN (MINIPRESS) 2 MG CAPSULE    Take 1 capsule by mouth nightly    TRAZODONE (DESYREL) 100 MG TABLET    Take 2 tablets by mouth nightly       Encounter Medications  Orders Placed This Encounter   Medications    sodium chloride 0.9 % bolus 1,000 mL    ondansetron (ZOFRAN) injection 4 mg    DISCONTD: dicyclomine (BENTYL) injection 20 mg    famotidine (PEPCID) tablet 20 mg    iopamidol (ISOVUE-370) 76 % injection 75 mL       ALLERGIES     is allergic to hydromorphone hcl.      RECENT VITALS:   Temp: 98.2 °F (36.8 °C),  Pulse: 85, Respirations: 18, BP: (!) 119/91    RADIOLOGY:   CT ABDOMEN PELVIS W IV CONTRAST

## 2025-07-16 ASSESSMENT — ENCOUNTER SYMPTOMS
ABDOMINAL PAIN: 1
CONSTIPATION: 1

## 2025-07-16 NOTE — ED PROVIDER NOTES
[facility] EMERGENCY DEPARTMENT  Emergency Department Encounter  Emergency Medicine Resident     Pt Name:Nicole Hartman  MRN: [id number]  Birthdate 1985  Date of evaluation: 7/16/25  PCP:  No primary care provider on file.  Note Started: 12:41 AM EDT      CHIEF COMPLAINT       Chief Complaint   Patient presents with    Abdominal Pain     Was constipated, not anymore, having pain       HISTORY OF PRESENT ILLNESS  (Location/Symptom, Timing/Onset, Context/Setting, Quality, Duration, Modifying Factors, Severity.)      Nicole Hartman is a 40 y.o. female who presents with abdominal pain.  Patient states her abdominal pain began yesterday and began to be associated with nausea.  Patient denies any fever/chills or vomiting.  Patient does have a history of cholecystectomy.  Patient is she had constipation for about 2 weeks prior to this abdominal pain.  Patient is also been complaining of dysuria.    PAST MEDICAL / SURGICAL / SOCIAL / FAMILY HISTORY      has a past medical history of ADHD, Allergy, Aspiration pneumonia (HCC), Bipolar 1 disorder (HCC), Bipolar affective disorder, depressed, severe (HCC), Bipolar affective disorder, depressed, severe (HCC), Bronchitis, chronic (HCC), Cocaine abuse with intoxication with complication (HCC), Depression with suicidal ideation, Intermittent asthma without complication, Osteoarthritis, Osteoarthritis, Rh negative state in antepartum period, Schizophrenia (HCC), Seizures (HCC), and Spinal stenosis.       has a past surgical history that includes Cholecystectomy; hernia repair; Upper gastrointestinal endoscopy; Mandible fracture surgery; Dilation and curettage of uterus; hernia repair (2011); Dilation & curettage; Mandible surgery; hernia repair; and Dilation and curettage of uterus.    Social History     Socioeconomic History    Marital status:      Spouse name: Not on file    Number of children: Not on file    Years of education: Not on file    Highest

## 2025-07-17 LAB
MICROORGANISM SPEC CULT: ABNORMAL
SPECIMEN DESCRIPTION: ABNORMAL

## 2025-07-18 NOTE — PROGRESS NOTES
Reviewed patient's urine culture - culture positive for E. coli.  Patient was discharged on cephalexin, and culture is sensitive to prescribed medication.  Antibiotic prescribed at discharge is appropriate - no changes made to antibiotic regimen.     Chary Diana PharmD, [facility]  7/18/2025  11:34 AM